# Patient Record
Sex: FEMALE | Race: BLACK OR AFRICAN AMERICAN | Employment: OTHER | URBAN - METROPOLITAN AREA
[De-identification: names, ages, dates, MRNs, and addresses within clinical notes are randomized per-mention and may not be internally consistent; named-entity substitution may affect disease eponyms.]

---

## 2017-01-26 ENCOUNTER — OFFICE VISIT (OUTPATIENT)
Dept: CARDIOLOGY CLINIC | Age: 67
End: 2017-01-26

## 2017-01-26 ENCOUNTER — PROCEDURE VISIT (OUTPATIENT)
Dept: CARDIOLOGY CLINIC | Age: 67
End: 2017-01-26

## 2017-01-26 VITALS — HEART RATE: 64 BPM | DIASTOLIC BLOOD PRESSURE: 64 MMHG | SYSTOLIC BLOOD PRESSURE: 98 MMHG

## 2017-01-26 DIAGNOSIS — Z95.0 PACEMAKER: ICD-10-CM

## 2017-01-26 DIAGNOSIS — I49.5 SSS (SICK SINUS SYNDROME) (HCC): Primary | ICD-10-CM

## 2017-01-26 DIAGNOSIS — I49.5 SSS (SICK SINUS SYNDROME) (HCC): ICD-10-CM

## 2017-01-26 DIAGNOSIS — I10 ESSENTIAL HYPERTENSION: ICD-10-CM

## 2017-01-26 PROCEDURE — 93280 PM DEVICE PROGR EVAL DUAL: CPT | Performed by: INTERNAL MEDICINE

## 2017-01-26 PROCEDURE — 99214 OFFICE O/P EST MOD 30 MIN: CPT | Performed by: INTERNAL MEDICINE

## 2017-01-26 RX ORDER — CALCIUM CARBONATE 500(1250)
500 TABLET ORAL DAILY
COMMUNITY

## 2017-01-26 RX ORDER — M-VIT,TX,IRON,MINS/CALC/FOLIC 27MG-0.4MG
1 TABLET ORAL DAILY
COMMUNITY

## 2017-01-26 RX ORDER — UBIDECARENONE 75 MG
50 CAPSULE ORAL DAILY
COMMUNITY

## 2017-04-07 ENCOUNTER — TELEPHONE (OUTPATIENT)
Dept: PRIMARY CARE CLINIC | Age: 67
End: 2017-04-07

## 2017-05-02 ENCOUNTER — OFFICE VISIT (OUTPATIENT)
Dept: CARDIOLOGY CLINIC | Age: 67
End: 2017-05-02

## 2017-05-02 ENCOUNTER — PROCEDURE VISIT (OUTPATIENT)
Dept: CARDIOLOGY CLINIC | Age: 67
End: 2017-05-02

## 2017-05-02 VITALS — HEART RATE: 65 BPM | DIASTOLIC BLOOD PRESSURE: 60 MMHG | SYSTOLIC BLOOD PRESSURE: 100 MMHG

## 2017-05-02 DIAGNOSIS — Z95.0 PACEMAKER: Primary | ICD-10-CM

## 2017-05-02 DIAGNOSIS — I49.5 SSS (SICK SINUS SYNDROME) (HCC): ICD-10-CM

## 2017-05-02 DIAGNOSIS — I10 ESSENTIAL HYPERTENSION: Primary | ICD-10-CM

## 2017-05-02 DIAGNOSIS — I42.9 CARDIOMYOPATHY (HCC): ICD-10-CM

## 2017-05-02 PROCEDURE — 99214 OFFICE O/P EST MOD 30 MIN: CPT | Performed by: INTERNAL MEDICINE

## 2017-05-02 PROCEDURE — 93280 PM DEVICE PROGR EVAL DUAL: CPT | Performed by: INTERNAL MEDICINE

## 2017-05-04 ENCOUNTER — TELEPHONE (OUTPATIENT)
Dept: CARDIOLOGY CLINIC | Age: 67
End: 2017-05-04

## 2017-05-23 ENCOUNTER — OFFICE VISIT (OUTPATIENT)
Dept: CARDIOLOGY CLINIC | Age: 67
End: 2017-05-23

## 2017-05-23 VITALS
HEART RATE: 60 BPM | BODY MASS INDEX: 33.2 KG/M2 | WEIGHT: 212 LBS | SYSTOLIC BLOOD PRESSURE: 128 MMHG | DIASTOLIC BLOOD PRESSURE: 72 MMHG

## 2017-05-23 DIAGNOSIS — R07.9 CHEST PAIN, UNSPECIFIED TYPE: ICD-10-CM

## 2017-05-23 DIAGNOSIS — Z95.0 PACEMAKER: ICD-10-CM

## 2017-05-23 DIAGNOSIS — K21.9 GASTROESOPHAGEAL REFLUX DISEASE, ESOPHAGITIS PRESENCE NOT SPECIFIED: ICD-10-CM

## 2017-05-23 DIAGNOSIS — I10 ESSENTIAL HYPERTENSION: ICD-10-CM

## 2017-05-23 DIAGNOSIS — I49.5 SSS (SICK SINUS SYNDROME) (HCC): Primary | ICD-10-CM

## 2017-05-23 PROCEDURE — 99214 OFFICE O/P EST MOD 30 MIN: CPT | Performed by: NURSE PRACTITIONER

## 2017-08-03 ENCOUNTER — PROCEDURE VISIT (OUTPATIENT)
Dept: CARDIOLOGY CLINIC | Age: 67
End: 2017-08-03

## 2017-08-03 ENCOUNTER — OFFICE VISIT (OUTPATIENT)
Dept: CARDIOLOGY CLINIC | Age: 67
End: 2017-08-03

## 2017-08-03 VITALS — HEART RATE: 60 BPM | DIASTOLIC BLOOD PRESSURE: 72 MMHG | SYSTOLIC BLOOD PRESSURE: 124 MMHG

## 2017-08-03 DIAGNOSIS — I42.0 DILATED CARDIOMYOPATHY (HCC): ICD-10-CM

## 2017-08-03 DIAGNOSIS — I49.5 SSS (SICK SINUS SYNDROME) (HCC): ICD-10-CM

## 2017-08-03 DIAGNOSIS — I49.5 SSS (SICK SINUS SYNDROME) (HCC): Primary | ICD-10-CM

## 2017-08-03 DIAGNOSIS — Z95.0 PACEMAKER: ICD-10-CM

## 2017-08-03 PROCEDURE — 93280 PM DEVICE PROGR EVAL DUAL: CPT | Performed by: INTERNAL MEDICINE

## 2017-08-03 PROCEDURE — 99214 OFFICE O/P EST MOD 30 MIN: CPT | Performed by: INTERNAL MEDICINE

## 2017-08-23 ENCOUNTER — TELEPHONE (OUTPATIENT)
Dept: CARDIOLOGY CLINIC | Age: 67
End: 2017-08-23

## 2017-10-10 ENCOUNTER — OFFICE VISIT (OUTPATIENT)
Dept: CARDIOLOGY CLINIC | Age: 67
End: 2017-10-10

## 2017-10-10 VITALS — HEART RATE: 60 BPM | SYSTOLIC BLOOD PRESSURE: 132 MMHG | DIASTOLIC BLOOD PRESSURE: 70 MMHG

## 2017-10-10 DIAGNOSIS — Z95.0 PACEMAKER: ICD-10-CM

## 2017-10-10 DIAGNOSIS — I25.9 CHEST PAIN DUE TO MYOCARDIAL ISCHEMIA, UNSPECIFIED ISCHEMIC CHEST PAIN TYPE: ICD-10-CM

## 2017-10-10 DIAGNOSIS — I10 ESSENTIAL HYPERTENSION: ICD-10-CM

## 2017-10-10 DIAGNOSIS — I49.5 SSS (SICK SINUS SYNDROME) (HCC): ICD-10-CM

## 2017-10-10 PROCEDURE — 99214 OFFICE O/P EST MOD 30 MIN: CPT | Performed by: INTERNAL MEDICINE

## 2017-10-10 RX ORDER — ISOSORBIDE DINITRATE AND HYDRALAZINE HYDROCHLORIDE 37.5; 2 MG/1; MG/1
0.51 TABLET ORAL 2 TIMES DAILY
Qty: 90 TABLET | Refills: 3 | Status: SHIPPED | OUTPATIENT
Start: 2017-10-10 | End: 2017-10-18 | Stop reason: SDUPTHER

## 2017-10-10 NOTE — PROGRESS NOTES
isosorbide-hydrALAZINE (BIDIL) 20-37.5 MG per tablet Take 0.5 tablets by mouth 2 times daily 60 tablet 11     No current facility-administered medications for this visit. REVIEW OF SYSTEMS:    CONSTITUTIONAL: No major weight gain or loss, fatigue, weakness, night sweats or fever. HEENT: No new vision difficulties or ringing in the ears. RESPIRATORY: No new SOB, PND, orthopnea or cough. CARDIOVASCULAR: See HPI  GI: No nausea, vomiting, diarrhea, constipation, abdominal pain or changes in bowel habits. : No urinary frequency, urgency, incontinence hematuria or dysuria. SKIN: No cyanosis or skin lesions. MUSCULOSKELETAL: No new muscle or joint pain. NEUROLOGICAL: No syncope or TIA-like symptoms. PSYCHIATRIC: No anxiety, pain, insomnia or depression    Objective:   PHYSICAL EXAM:        VITALS:    Wt Readings from Last 3 Encounters:   05/23/17 212 lb (96.2 kg)   09/26/16 208 lb (94.3 kg)   04/22/16 219 lb 6.4 oz (99.5 kg)     BP Readings from Last 3 Encounters:   10/10/17 132/70   08/03/17 124/72   05/23/17 128/72     Pulse Readings from Last 3 Encounters:   10/10/17 60   08/03/17 60   05/23/17 60       CONSTITUTIONAL: Cooperative, no apparent distress, and appears well nourished / developed  NEUROLOGIC:  Awake and orientated to person, place and time. PSYCH: Calm affect. SKIN: Warm and dry. HEENT: Sclera non-icteric, normocephalic, neck supple, no elevation of JVP, normal carotid pulses with no bruits and thyroid normal size. LUNGS:  No increased work of breathing and clear to auscultation, no crackles or wheezing  CARDIOVASCULAR: The lungs are clear. There are no lifts or thrills. The heart is regular. The abdomen is soft. Cervical veins are not engorged  The carotid upstroke is normal in amplitude and contour without delay or bruit  JVP is not elevated  ABDOMEN:  Normal bowel sounds, non-distended and non-tender to palpation  EXT: No edema, no calf tenderness.  Pulses are present bilaterally. DATA:    Lab Results   Component Value Date    ALT 18 01/14/2016    AST 16 01/14/2016    ALKPHOS 59 01/14/2016    BILITOT 0.3 01/14/2016     Lab Results   Component Value Date    CREATININE 0.8 01/14/2016    BUN 11 01/14/2016     01/14/2016    K 4.4 01/14/2016     01/14/2016    CO2 26 01/14/2016     No results found for: TSH, J5PDCVM, T4GVCAO, THYROIDAB  Lab Results   Component Value Date    WBC 6.3 01/14/2016    HGB 12.4 01/14/2016    HCT 38.5 01/14/2016    MCV 87.7 01/14/2016     01/14/2016     No components found for: CHLPL  No results found for: TRIG  No results found for: HDL  No results found for: LDLCALC  No results found for: LABVLDL  Radiology Review:  Pertinent images / reports were reviewed as a part of this visit and reveals the following:    Last Echo: September 2015  Conclusions      Summary   Left ventricle size is normal.   Normal left ventricular wall thickness.   There is reversal of E/A inflow velocities across the mitral valve   suggesting impaired left ventricular relaxation.   E/e'= 9.1 .   Overall left ventricular systolic function is normal with LVEF 55 to 60%   Mitral valve is structurally normal.   Trivial mitral regurgitation is present.   The left atrium is dilated.   Normal right ventricular size and function.   Pacer / ICD wire is visualized in the right ventricle.     Last Stress Test / Angiogram:  Summary 7/1/15   No evidence for lexiscan-induced ischemia. The LV cavity is qualitatively   more dilated at stress and at rest. The TID is 1.36 and is abnormal.   Left ventricular ejection fraction is greater than 70% with normal LV wall   motion on post-stress gated imaging.       Conclusions 10/4/16      Summary   Left ventricle size is normal.   Normal left ventricular wall thickness.   There is reversal of E/A inflow velocities across the mitral valve   suggesting impaired left ventricular relaxation.   E/e'= 9.1 .   Overall left ventricular systolic function is normal with LVEF 55 to 60%   Mitral valve is structurally normal.   Trivial mitral regurgitation is present.   The left atrium is dilated.   Normal right ventricular size and function.   Pacer / ICD wire is visualized in the right ventricle.     Conclusions 10/4/16        Summary    Normal stress myocardial perfusion.        There is normal isotope uptake at stress and rest. There is no evidence of    myocardial ischemia or scar.    Normal LV size and systolic function.       This is a left dominant coronary arterial system Cath 10/3/17 at [facility]    Left Main coronary artery: angiographically normal  Left anterior descending coronary artery: angiographically normal  Left circumflex coronary artery: angiographically normal  Right coronary artery: non-dominant, angiographically normal  Left ventriculogram: normal wall motion, LVEF = 55-60%  LVEDP: 10 mmHg  Aortic pressure: 114/90 mmHg    Impression:   Angiographically normal  Normal LV function  Normal LVEDP  Normal systemic pressures    Plan:  Continue medical therapy for cardiovascular risk factor   reduction. Last ECG:  Sinus rhythm with AV dual pacing at 60/m. Assessment:    . She has a history of cardiomyopathy that has improved over the years. A heart cath was performed on October 3 and she had normal coronary arteries with normalized ejection fraction at 55-60%. She is doing fine and has no complications. We will have her taper down and get rid of her isosorbide. Return to see me in 6 months.

## 2017-10-18 ENCOUNTER — TELEPHONE (OUTPATIENT)
Dept: CARDIOLOGY CLINIC | Age: 67
End: 2017-10-18

## 2017-10-18 DIAGNOSIS — I10 ESSENTIAL HYPERTENSION: ICD-10-CM

## 2017-10-18 DIAGNOSIS — I49.5 SSS (SICK SINUS SYNDROME) (HCC): ICD-10-CM

## 2017-10-18 DIAGNOSIS — Z95.0 PACEMAKER: ICD-10-CM

## 2017-10-18 DIAGNOSIS — I25.9 CHEST PAIN DUE TO MYOCARDIAL ISCHEMIA, UNSPECIFIED ISCHEMIC CHEST PAIN TYPE: ICD-10-CM

## 2017-10-18 RX ORDER — ISOSORBIDE DINITRATE AND HYDRALAZINE HYDROCHLORIDE 37.5; 2 MG/1; MG/1
0.51 TABLET ORAL 2 TIMES DAILY
Qty: 30 TABLET | Refills: 3 | Status: SHIPPED | OUTPATIENT
Start: 2017-10-18 | End: 2018-03-08 | Stop reason: SDUPTHER

## 2017-10-18 NOTE — TELEPHONE ENCOUNTER
Ms. Parker Blunt  came into the office stating they are having trouble affording patient's Bidil. Please call patient about this. Thanks.

## 2017-10-18 NOTE — TELEPHONE ENCOUNTER
Spoke with patient,per her insurance co they want her to try other htn medicines  Patient does not want to do this since she has been on bidil since 2004

## 2017-10-20 ENCOUNTER — TELEPHONE (OUTPATIENT)
Dept: CARDIOLOGY CLINIC | Age: 67
End: 2017-10-20

## 2017-12-05 ENCOUNTER — NURSE ONLY (OUTPATIENT)
Dept: CARDIOLOGY CLINIC | Age: 67
End: 2017-12-05

## 2017-12-05 DIAGNOSIS — Z95.0 PACEMAKER: ICD-10-CM

## 2017-12-05 DIAGNOSIS — I49.5 SSS (SICK SINUS SYNDROME) (HCC): ICD-10-CM

## 2017-12-05 PROCEDURE — 93294 REM INTERROG EVL PM/LDLS PM: CPT | Performed by: INTERNAL MEDICINE

## 2017-12-05 PROCEDURE — 93296 REM INTERROG EVL PM/IDS: CPT | Performed by: INTERNAL MEDICINE

## 2017-12-06 ENCOUNTER — TELEPHONE (OUTPATIENT)
Dept: CARDIOLOGY CLINIC | Age: 67
End: 2017-12-06

## 2017-12-08 NOTE — PROGRESS NOTES
Merlin transmission shows normal device function. See interrogation for further details. Recheck 3 mos.

## 2018-02-09 ENCOUNTER — OFFICE VISIT (OUTPATIENT)
Dept: CARDIOLOGY CLINIC | Age: 68
End: 2018-02-09

## 2018-02-09 VITALS — SYSTOLIC BLOOD PRESSURE: 112 MMHG | HEART RATE: 62 BPM | DIASTOLIC BLOOD PRESSURE: 60 MMHG

## 2018-02-09 DIAGNOSIS — I25.10 CORONARY ARTERY DISEASE DUE TO LIPID RICH PLAQUE: Primary | ICD-10-CM

## 2018-02-09 DIAGNOSIS — I25.83 CORONARY ARTERY DISEASE DUE TO LIPID RICH PLAQUE: Primary | ICD-10-CM

## 2018-02-09 PROCEDURE — 99214 OFFICE O/P EST MOD 30 MIN: CPT | Performed by: INTERNAL MEDICINE

## 2018-02-09 NOTE — PROGRESS NOTES
amplitude and contour without delay or bruit  JVP is not elevated  ABDOMEN:  Normal bowel sounds, non-distended and non-tender to palpation  EXT: No edema, no calf tenderness. Pulses are present bilaterally. DATA:    Lab Results   Component Value Date    ALT 18 01/14/2016    AST 16 01/14/2016    ALKPHOS 59 01/14/2016    BILITOT 0.3 01/14/2016     Lab Results   Component Value Date    CREATININE 0.8 01/14/2016    BUN 11 01/14/2016     01/14/2016    K 4.4 01/14/2016     01/14/2016    CO2 26 01/14/2016     No results found for: TSH, E5NAAER, R6DAHHS, THYROIDAB  Lab Results   Component Value Date    WBC 6.3 01/14/2016    HGB 12.4 01/14/2016    HCT 38.5 01/14/2016    MCV 87.7 01/14/2016     01/14/2016     No components found for: CHLPL  No results found for: TRIG  No results found for: HDL  No results found for: LDLCALC  No results found for: LABVLDL  Radiology Review:  Pertinent images / reports were reviewed as a part of this visit and reveals the following:    Last Echo: September 2015  Conclusions      Summary   Left ventricle size is normal.   Normal left ventricular wall thickness.   There is reversal of E/A inflow velocities across the mitral valve   suggesting impaired left ventricular relaxation.   E/e'= 9.1 .   Overall left ventricular systolic function is normal with LVEF 55 to 60%   Mitral valve is structurally normal.   Trivial mitral regurgitation is present.   The left atrium is dilated.   Normal right ventricular size and function.   Pacer / ICD wire is visualized in the right ventricle.     Last Stress Test / Angiogram:  Summary 7/1/15   No evidence for lexiscan-induced ischemia. The LV cavity is qualitatively   more dilated at stress and at rest. The TID is 1.36 and is abnormal.   Left ventricular ejection fraction is greater than 70% with normal LV wall   motion on post-stress gated imaging.       Conclusions 10/4/16      Summary   Left ventricle size is normal.   Normal left will obtain carotid Dopplers on this patient.

## 2018-03-07 DIAGNOSIS — I25.9 CHEST PAIN DUE TO MYOCARDIAL ISCHEMIA, UNSPECIFIED ISCHEMIC CHEST PAIN TYPE: ICD-10-CM

## 2018-03-07 DIAGNOSIS — Z95.0 PACEMAKER: ICD-10-CM

## 2018-03-07 DIAGNOSIS — I10 ESSENTIAL HYPERTENSION: ICD-10-CM

## 2018-03-07 DIAGNOSIS — I49.5 SSS (SICK SINUS SYNDROME) (HCC): ICD-10-CM

## 2018-03-08 DIAGNOSIS — Z95.0 PACEMAKER: ICD-10-CM

## 2018-03-08 DIAGNOSIS — I49.5 SSS (SICK SINUS SYNDROME) (HCC): ICD-10-CM

## 2018-03-08 DIAGNOSIS — I25.9 CHEST PAIN DUE TO MYOCARDIAL ISCHEMIA, UNSPECIFIED ISCHEMIC CHEST PAIN TYPE: ICD-10-CM

## 2018-03-08 DIAGNOSIS — I10 ESSENTIAL HYPERTENSION: ICD-10-CM

## 2018-03-08 RX ORDER — ISOSORBIDE DINITRATE 10 MG/1
10 TABLET ORAL 2 TIMES DAILY
Qty: 90 TABLET | Refills: 3 | Status: SHIPPED | OUTPATIENT
Start: 2018-03-08 | End: 2018-09-13 | Stop reason: SDUPTHER

## 2018-03-08 RX ORDER — ISOSORBIDE DINITRATE AND HYDRALAZINE HYDROCHLORIDE 37.5; 2 MG/1; MG/1
0.51 TABLET ORAL 2 TIMES DAILY
Qty: 30 TABLET | Refills: 11 | Status: SHIPPED | OUTPATIENT
Start: 2018-03-08 | End: 2018-03-09 | Stop reason: SDUPTHER

## 2018-03-08 RX ORDER — ISOSORBIDE DINITRATE AND HYDRALAZINE HYDROCHLORIDE 37.5; 2 MG/1; MG/1
0.51 TABLET ORAL 2 TIMES DAILY
Qty: 90 TABLET | Refills: 3 | Status: CANCELLED | OUTPATIENT
Start: 2018-03-08

## 2018-03-08 RX ORDER — ISOSORBIDE DINITRATE 10 MG/1
10 TABLET ORAL 2 TIMES DAILY
Qty: 90 TABLET | Refills: 3 | Status: SHIPPED | OUTPATIENT
Start: 2018-03-08 | End: 2018-03-08 | Stop reason: SDUPTHER

## 2018-03-09 DIAGNOSIS — I10 ESSENTIAL HYPERTENSION: ICD-10-CM

## 2018-03-09 DIAGNOSIS — I25.9 CHEST PAIN DUE TO MYOCARDIAL ISCHEMIA, UNSPECIFIED ISCHEMIC CHEST PAIN TYPE: ICD-10-CM

## 2018-03-09 DIAGNOSIS — I49.5 SSS (SICK SINUS SYNDROME) (HCC): ICD-10-CM

## 2018-03-09 DIAGNOSIS — Z95.0 PACEMAKER: ICD-10-CM

## 2018-03-09 RX ORDER — ISOSORBIDE DINITRATE AND HYDRALAZINE HYDROCHLORIDE 37.5; 2 MG/1; MG/1
0.51 TABLET ORAL 2 TIMES DAILY
Qty: 180 TABLET | Refills: 3 | Status: SHIPPED | OUTPATIENT
Start: 2018-03-09 | End: 2018-06-01 | Stop reason: SDUPTHER

## 2018-04-02 ENCOUNTER — PROCEDURE VISIT (OUTPATIENT)
Dept: CARDIOLOGY CLINIC | Age: 68
End: 2018-04-02

## 2018-04-02 DIAGNOSIS — Z95.0 PACEMAKER: ICD-10-CM

## 2018-04-02 DIAGNOSIS — I49.5 SSS (SICK SINUS SYNDROME) (HCC): ICD-10-CM

## 2018-04-02 PROCEDURE — 93280 PM DEVICE PROGR EVAL DUAL: CPT | Performed by: INTERNAL MEDICINE

## 2018-04-16 ENCOUNTER — OFFICE VISIT (OUTPATIENT)
Dept: CARDIOLOGY CLINIC | Age: 68
End: 2018-04-16

## 2018-04-16 VITALS — DIASTOLIC BLOOD PRESSURE: 76 MMHG | SYSTOLIC BLOOD PRESSURE: 104 MMHG | HEART RATE: 78 BPM

## 2018-04-16 DIAGNOSIS — I10 ESSENTIAL HYPERTENSION: Primary | ICD-10-CM

## 2018-04-16 PROCEDURE — 99214 OFFICE O/P EST MOD 30 MIN: CPT | Performed by: NURSE PRACTITIONER

## 2018-06-01 ENCOUNTER — TELEPHONE (OUTPATIENT)
Dept: CARDIOLOGY CLINIC | Age: 68
End: 2018-06-01

## 2018-06-01 DIAGNOSIS — Z95.0 PACEMAKER: ICD-10-CM

## 2018-06-01 DIAGNOSIS — I25.9 CHEST PAIN DUE TO MYOCARDIAL ISCHEMIA, UNSPECIFIED ISCHEMIC CHEST PAIN TYPE: ICD-10-CM

## 2018-06-01 DIAGNOSIS — I10 ESSENTIAL HYPERTENSION: ICD-10-CM

## 2018-06-01 DIAGNOSIS — I49.5 SSS (SICK SINUS SYNDROME) (HCC): ICD-10-CM

## 2018-06-01 RX ORDER — ISOSORBIDE DINITRATE AND HYDRALAZINE HYDROCHLORIDE 37.5; 2 MG/1; MG/1
0.51 TABLET ORAL 2 TIMES DAILY
Qty: 180 TABLET | Refills: 3 | Status: SHIPPED | OUTPATIENT
Start: 2018-06-01 | End: 2018-11-30 | Stop reason: SDUPTHER

## 2018-07-03 ENCOUNTER — NURSE ONLY (OUTPATIENT)
Dept: CARDIOLOGY CLINIC | Age: 68
End: 2018-07-03

## 2018-07-03 DIAGNOSIS — Z95.0 PACEMAKER: ICD-10-CM

## 2018-07-03 DIAGNOSIS — I49.5 SSS (SICK SINUS SYNDROME) (HCC): ICD-10-CM

## 2018-07-03 PROCEDURE — 93294 REM INTERROG EVL PM/LDLS PM: CPT | Performed by: INTERNAL MEDICINE

## 2018-07-03 PROCEDURE — 93296 REM INTERROG EVL PM/IDS: CPT | Performed by: INTERNAL MEDICINE

## 2018-07-03 NOTE — PROGRESS NOTES
Merlin transmission shows normal device function. Battery and lead function stable. No VT. Multiple RYAN episodes, brief. Oversensing FFRW. See report under media tab. Recheck 3 mos.  mk

## 2018-09-13 ENCOUNTER — OFFICE VISIT (OUTPATIENT)
Dept: CARDIOLOGY CLINIC | Age: 68
End: 2018-09-13

## 2018-09-13 VITALS
WEIGHT: 216 LBS | HEART RATE: 68 BPM | BODY MASS INDEX: 33.83 KG/M2 | DIASTOLIC BLOOD PRESSURE: 68 MMHG | SYSTOLIC BLOOD PRESSURE: 122 MMHG

## 2018-09-13 DIAGNOSIS — I49.5 SSS (SICK SINUS SYNDROME) (HCC): ICD-10-CM

## 2018-09-13 DIAGNOSIS — R07.9 CHEST PAIN, UNSPECIFIED TYPE: ICD-10-CM

## 2018-09-13 DIAGNOSIS — I10 ESSENTIAL HYPERTENSION: ICD-10-CM

## 2018-09-13 DIAGNOSIS — Z95.0 PACEMAKER: Primary | ICD-10-CM

## 2018-09-13 PROCEDURE — 99214 OFFICE O/P EST MOD 30 MIN: CPT | Performed by: NURSE PRACTITIONER

## 2018-09-13 RX ORDER — UBIDECARENONE 75 MG
100 CAPSULE ORAL
COMMUNITY
End: 2018-09-13

## 2018-09-13 RX ORDER — RANITIDINE 150 MG/1
TABLET ORAL
COMMUNITY
Start: 2018-09-11 | End: 2020-01-29

## 2018-09-26 ENCOUNTER — HOSPITAL ENCOUNTER (OUTPATIENT)
Dept: NON INVASIVE DIAGNOSTICS | Age: 68
Discharge: HOME | End: 2018-09-26
Payer: MEDICARE

## 2018-09-26 ENCOUNTER — HOSPITAL ENCOUNTER (OUTPATIENT)
Dept: NUCLEAR MEDICINE | Age: 68
Discharge: HOME | End: 2018-09-26
Payer: MEDICARE

## 2018-09-26 DIAGNOSIS — I10 ESSENTIAL HYPERTENSION: ICD-10-CM

## 2018-09-26 DIAGNOSIS — R07.9 CHEST PAIN, UNSPECIFIED TYPE: ICD-10-CM

## 2018-09-26 DIAGNOSIS — I49.5 SSS (SICK SINUS SYNDROME) (HCC): ICD-10-CM

## 2018-09-26 DIAGNOSIS — Z95.0 PACEMAKER: ICD-10-CM

## 2018-09-26 LAB
LV EF: 48 %
LV EF: 57 %
LVEF MODALITY: NORMAL
LVEF MODALITY: NORMAL

## 2018-09-26 PROCEDURE — 6360000002 HC RX W HCPCS: Performed by: NURSE PRACTITIONER

## 2018-09-26 PROCEDURE — A9502 TC99M TETROFOSMIN: HCPCS | Performed by: NURSE PRACTITIONER

## 2018-09-26 PROCEDURE — 3430000000 HC RX DIAGNOSTIC RADIOPHARMACEUTICAL: Performed by: NURSE PRACTITIONER

## 2018-09-26 PROCEDURE — 78452 HT MUSCLE IMAGE SPECT MULT: CPT

## 2018-09-26 PROCEDURE — 2580000003 HC RX 258: Performed by: NURSE PRACTITIONER

## 2018-09-26 PROCEDURE — 93306 TTE W/DOPPLER COMPLETE: CPT

## 2018-09-26 PROCEDURE — 93017 CV STRESS TEST TRACING ONLY: CPT

## 2018-09-26 RX ORDER — 0.9 % SODIUM CHLORIDE 0.9 %
10 VIAL (ML) INJECTION PRN
Status: DISCONTINUED | OUTPATIENT
Start: 2018-09-26 | End: 2018-09-27 | Stop reason: HOSPADM

## 2018-09-26 RX ADMIN — SODIUM CHLORIDE 10 ML: 9 INJECTION, SOLUTION INTRAMUSCULAR; INTRAVENOUS; SUBCUTANEOUS at 10:00

## 2018-09-26 RX ADMIN — TETROFOSMIN 10 MILLICURIE: 1.38 INJECTION, POWDER, LYOPHILIZED, FOR SOLUTION INTRAVENOUS at 10:00

## 2018-09-26 RX ADMIN — REGADENOSON 0.4 MG: 0.08 INJECTION, SOLUTION INTRAVENOUS at 11:40

## 2018-09-26 RX ADMIN — SODIUM CHLORIDE 10 ML: 9 INJECTION, SOLUTION INTRAMUSCULAR; INTRAVENOUS; SUBCUTANEOUS at 11:40

## 2018-09-26 RX ADMIN — TETROFOSMIN 30 MILLICURIE: 1.38 INJECTION, POWDER, LYOPHILIZED, FOR SOLUTION INTRAVENOUS at 11:40

## 2018-09-27 ENCOUNTER — TELEPHONE (OUTPATIENT)
Dept: CARDIOLOGY CLINIC | Age: 68
End: 2018-09-27

## 2018-09-27 NOTE — TELEPHONE ENCOUNTER
----- Message from MARIA ELENA John CNP sent at 9/26/2018  3:31 PM EDT -----    No reversible ischemia  lmom

## 2018-10-02 ENCOUNTER — NURSE ONLY (OUTPATIENT)
Dept: CARDIOLOGY CLINIC | Age: 68
End: 2018-10-02
Payer: MEDICARE

## 2018-10-02 DIAGNOSIS — I49.5 SSS (SICK SINUS SYNDROME) (HCC): ICD-10-CM

## 2018-10-02 DIAGNOSIS — Z95.0 PACEMAKER: ICD-10-CM

## 2018-10-02 PROCEDURE — 93296 REM INTERROG EVL PM/IDS: CPT | Performed by: INTERNAL MEDICINE

## 2018-10-02 PROCEDURE — 93294 REM INTERROG EVL PM/LDLS PM: CPT | Performed by: INTERNAL MEDICINE

## 2018-10-02 NOTE — LETTER
[address] [facility] [phone]  [place]- [phone]  [facility] [place] [phone]    Pacemaker/Defibrillator Clinic          10/04/18        [address] [facility]        Dear Louann Rico    This letter is to inform you that we received the transmission from your monitor at home that checks your pacemaker and/or defibrillator, or implanted heart monitor. The next date your monitor will automatically transmit will be 1/2/19. Please do not send additional routine transmissions unless specifically requested. Your device and monitor are wireless and most transmit cellularly, but please periodically check your monitor is still plugged in to the electrical outlet. If you still use the telephone land line to send please ensure the connection to the phone adela is secure. This will help to ensure successful automatic transmissions in the future. Also, the monitor needs to be close to you while sleeping at night. Please be aware that the remote device transmission sites are periodically monitored only during regular business hours during which simultaneous in-office device clinics are being run. If your transmission requires attention, we will contact you as soon as possible. Thank you.             [address]

## 2018-11-02 ENCOUNTER — OFFICE VISIT (OUTPATIENT)
Dept: CARDIOLOGY CLINIC | Age: 68
End: 2018-11-02
Payer: MEDICARE

## 2018-11-02 VITALS
HEART RATE: 60 BPM | WEIGHT: 211 LBS | SYSTOLIC BLOOD PRESSURE: 118 MMHG | BODY MASS INDEX: 33.05 KG/M2 | DIASTOLIC BLOOD PRESSURE: 68 MMHG

## 2018-11-02 DIAGNOSIS — I49.5 SSS (SICK SINUS SYNDROME) (HCC): ICD-10-CM

## 2018-11-02 DIAGNOSIS — Z95.0 PACEMAKER: Primary | ICD-10-CM

## 2018-11-02 DIAGNOSIS — I10 ESSENTIAL HYPERTENSION: ICD-10-CM

## 2018-11-02 PROCEDURE — 99214 OFFICE O/P EST MOD 30 MIN: CPT | Performed by: NURSE PRACTITIONER

## 2018-11-02 NOTE — PROGRESS NOTES
[facility]   Dr Rob Arizmendi MD, [address] [facility]                                                 Outpatient Follow Up Note      CC: fu with HTN SSS/ SJM dual chamber PPM  interrogation today  No c/o cp SOB edema     11/02/18  HPI:  Aditya Ceballos is 75 y.o. female who presents today with PPM  C normal  cors 10/2017   / neg stress test in 2018   2/14 adhesions removed             10/17 Angiographically normal  Normal LV function  Normal LVEDP  Normal systemic pressures        Prior to Visit Medications    Medication Sig Taking? Authorizing Provider   ranitidine (ZANTAC) 150 MG tablet   Historical Provider, MD   isosorbide-hydrALAZINE (BIDIL) 20-37.5 MG per tablet Take 0.5 tablets by mouth 2 times daily  Justine Carbajal MD   calcium carbonate (OSCAL) 500 MG TABS tablet Take 500 mg by mouth daily  Historical Provider, MD   vitamin B-12 (CYANOCOBALAMIN) 100 MCG tablet Take 50 mcg by mouth daily  Historical Provider, MD   Multiple Vitamins-Minerals (THERAPEUTIC MULTIVITAMIN-MINERALS) tablet Take 1 tablet by mouth daily  Historical Provider, MD     Family History  History reviewed. No pertinent family history. · ROS:   · Cardiovascular: Reviewed in HPI  · Allergic/Immunologic: No nasal congestion or hives. · All other ROS are reviewed and are unremarkable. PHYSICAL EXAM:      Wt Readings from Last 3 Encounters:   11/02/18 211 lb (95.7 kg)   09/13/18 216 lb (98 kg)   05/23/17 212 lb (96.2 kg)       BP Readings from Last 3 Encounters:   11/02/18 118/68   09/13/18 122/68   04/16/18 104/76       Pulse Readings from Last 3 Encounters:   11/02/18 60   09/13/18 68   04/16/18 78       Exam   ENT: neg   NEUROLOGIC:  Neg   PSYCH: neg  SKIN: Warm and dry.   LUNGS:  No increased work of breathing and clear to auscultation, no crackles or wheezing  CARDIOVASCULAR: RRR  ABDOMEN: soft   EXT: no edema     Assessment:    SSS  SJM  Interrogation [facility] dual chamber

## 2018-11-28 ENCOUNTER — TELEPHONE (OUTPATIENT)
Dept: CARDIOLOGY CLINIC | Age: 68
End: 2018-11-28

## 2018-11-30 DIAGNOSIS — I25.9 CHEST PAIN DUE TO MYOCARDIAL ISCHEMIA, UNSPECIFIED ISCHEMIC CHEST PAIN TYPE: ICD-10-CM

## 2018-11-30 DIAGNOSIS — Z95.0 PACEMAKER: ICD-10-CM

## 2018-11-30 DIAGNOSIS — I10 ESSENTIAL HYPERTENSION: ICD-10-CM

## 2018-11-30 DIAGNOSIS — I49.5 SSS (SICK SINUS SYNDROME) (HCC): ICD-10-CM

## 2018-11-30 RX ORDER — ISOSORBIDE DINITRATE AND HYDRALAZINE HYDROCHLORIDE 37.5; 2 MG/1; MG/1
0.51 TABLET ORAL 2 TIMES DAILY
Qty: 60 TABLET | Refills: 11 | Status: SHIPPED | OUTPATIENT
Start: 2018-11-30 | End: 2019-07-01 | Stop reason: SDUPTHER

## 2018-11-30 RX ORDER — ISOSORBIDE DINITRATE AND HYDRALAZINE HYDROCHLORIDE 37.5; 2 MG/1; MG/1
0.51 TABLET ORAL 2 TIMES DAILY
Qty: 60 TABLET | Refills: 11 | Status: SHIPPED | OUTPATIENT
Start: 2018-11-30 | End: 2018-11-30 | Stop reason: SDUPTHER

## 2019-01-02 ENCOUNTER — NURSE ONLY (OUTPATIENT)
Dept: CARDIOLOGY CLINIC | Age: 69
End: 2019-01-02
Payer: MEDICARE

## 2019-01-02 DIAGNOSIS — I49.5 SSS (SICK SINUS SYNDROME) (HCC): ICD-10-CM

## 2019-01-02 DIAGNOSIS — Z95.0 PACEMAKER: ICD-10-CM

## 2019-01-02 PROCEDURE — 93294 REM INTERROG EVL PM/LDLS PM: CPT | Performed by: INTERNAL MEDICINE

## 2019-01-02 PROCEDURE — 93296 REM INTERROG EVL PM/IDS: CPT | Performed by: INTERNAL MEDICINE

## 2019-04-02 ENCOUNTER — NURSE ONLY (OUTPATIENT)
Dept: CARDIOLOGY CLINIC | Age: 69
End: 2019-04-02

## 2019-04-02 DIAGNOSIS — Z95.0 PACEMAKER: ICD-10-CM

## 2019-04-02 DIAGNOSIS — I49.5 SSS (SICK SINUS SYNDROME) (HCC): ICD-10-CM

## 2019-04-02 NOTE — LETTER
[address] [facility] [phone]  [facility]- [phone]  [facility] [address] [phone]    Pacemaker/Defibrillator Clinic      04/08/19        [address] [facility]        Dear Orlando Lewis    This letter is to inform you that we received the transmission from your monitor at home that checks your pacemaker and/or defibrillator, or implanted heart monitor. The next date your monitor will automatically transmit will be 7/2/19. Please do not send additional routine transmissions unless specifically requested. Your device and monitor are wireless and most transmit cellularly, but please periodically check your monitor is still plugged in to the electrical outlet. If you still use the telephone land line to send please ensure the connection to the phone adela is secure. This will help to ensure successful automatic transmissions in the future. Also, the monitor needs to be close to you while sleeping at night. Please be aware that the remote device transmission sites are periodically monitored only during regular business hours during which simultaneous in-office device clinics are being run. If your transmission requires attention, we will contact you as soon as possible. Please also keep in mind that implanted devices should be checked in the office manually once per year. Please call our office if you need to schedule this visit. Thank you.             [facility]

## 2019-04-08 NOTE — PROGRESS NOTES
Merlin transmission shows normal device function. Battery and lead function stable. No VT. PAF - 24 mins, appears new. There is some oversensing on atrial channel but prolonged episode seems real. Will review with EP as not taking AC. See Paceart report under cardiology tab.  mk

## 2019-04-15 ENCOUNTER — TELEPHONE (OUTPATIENT)
Dept: CARDIOLOGY CLINIC | Age: 69
End: 2019-04-15

## 2019-04-15 NOTE — TELEPHONE ENCOUNTER
Tried calling pt on home and cell numbers. Need to set up EP ov for PAF on pacer. Did not leave a message as VM was non specific. Gave to schedulers to set up.

## 2019-05-13 ENCOUNTER — OFFICE VISIT (OUTPATIENT)
Dept: CARDIOLOGY CLINIC | Age: 69
End: 2019-05-13
Payer: MEDICARE

## 2019-05-13 VITALS
WEIGHT: 214.2 LBS | BODY MASS INDEX: 33.55 KG/M2 | DIASTOLIC BLOOD PRESSURE: 80 MMHG | HEART RATE: 64 BPM | SYSTOLIC BLOOD PRESSURE: 139 MMHG

## 2019-05-13 DIAGNOSIS — I10 ESSENTIAL HYPERTENSION: Primary | ICD-10-CM

## 2019-05-13 DIAGNOSIS — I49.5 SSS (SICK SINUS SYNDROME) (HCC): ICD-10-CM

## 2019-05-13 PROCEDURE — 99214 OFFICE O/P EST MOD 30 MIN: CPT | Performed by: NURSE PRACTITIONER

## 2019-05-13 RX ORDER — UBIDECARENONE 50 MG
50 CAPSULE ORAL
COMMUNITY

## 2019-05-13 NOTE — PATIENT INSTRUCTIONS
Plan:  c/o stressed and atypical cp / neg [facility] 2017 neg stress test 2018  She does not want another stress test & she has had recent test   She has appt 6/20  with us for PPM interrogation

## 2019-05-13 NOTE — PROGRESS NOTES
[facility]  Office Visit           Anju Murphy MD,  Therese Cedeno APRN FNP [address] [employer]       Cardiology           Ramone Sides  1/19/1951    May 13, 2019    CC: here with cp       HPI:  The patient is 70 y.o. female with atypical cp wax and wane for days, no specific cause or remedy / non c/o fever cough n/v /appears euvolemic   No pain with activity    stated she is stressed d/t sister ill    [facility] normal  cors 10/2017   / neg stress test in 2018     No SOB no wt gain no PND SHERRI   HTN SSS/ SJM dual chamber PPM      Reviewed most recent test with pt. Review of Systems:  Constitutional: Denies  fatigue, weakness, night sweats or fever. HEENT: Denies new visual changes, ringing in ears, nosebleeds,nasal congestion  Respiratory: Denies new or change in SOB, PND, orthopnea or cough. Cardiovascular: see HPI  GI: Denies N/V, diarrhea, constipation, abdominal pain, change in bowel habits, melena or hematochezia  : Denies urinary frequency, urgency, incontinence, hematuria or dysuria. Skin: Denies rash, hives, or cyanosis  Musculoskeletal: Denies joint or muscle aches/pain  Neurological: Denies syncope or TIA-like symptoms. Psychiatric: Denies anxiety, insomnia or depression     History reviewed. No pertinent past medical history. History reviewed. No pertinent surgical history. No family history on file.   Social History     Tobacco Use    Smoking status: Never Smoker    Smokeless tobacco: Never Used   Substance Use Topics    Alcohol use: Not on file    Drug use: Not on file       Allergies   Allergen Reactions    Morphine Nausea And Vomiting     Current Outpatient Medications   Medication Sig Dispense Refill    Coenzyme Q10 50 MG CAPS Take 50 mg by mouth      isosorbide-hydrALAZINE (BIDIL) 20-37.5 MG per tablet Take 0.5 tablets by mouth 2 times daily 60 tablet 11    ranitidine (ZANTAC) 150 MG tablet       calcium carbonate (OSCAL) 500 MG TABS tablet Take 500 mg by mouth daily      vitamin B-12 (CYANOCOBALAMIN) 100 MCG tablet Take 50 mcg by mouth daily      Multiple Vitamins-Minerals (THERAPEUTIC MULTIVITAMIN-MINERALS) tablet Take 1 tablet by mouth daily       No current facility-administered medications for this visit. Physical Exam:   /80   Pulse 64   Wt 214 lb 3.2 oz (97.2 kg)   BMI 33.55 kg/m²   BP Readings from Last 3 Encounters:   05/13/19 139/80   11/02/18 118/68   09/13/18 122/68     Pulse Readings from Last 3 Encounters:   05/13/19 64   11/02/18 60   09/13/18 68     Wt Readings from Last 3 Encounters:   05/13/19 214 lb 3.2 oz (97.2 kg)   11/02/18 211 lb (95.7 kg)   09/13/18 216 lb (98 kg)     Constitutional: She is oriented to person, place, and time. She appears well-developed and well-nourished. In no acute distress. HEENT: Normocephalic and atraumatic. Sclerae anicteric. No xanthelasmas. Conjunctiva white, no subconjunctival hemorrhage   External inspection of ears nose teeth & gums   Eyes:PERRLA EOM's intact. Neck: Neck supple. No JVD present. Carotids without bruits. No mass and no thyromegaly present. No lymphadenopathy present. Cardiovascular: RRR, normal S1 and S2; no murmur/gallop or rub, PMI nondisplaced  Pulmonary/Chest: Effort normal.  Lungs clear to auscultation. Chest wall nontender  Abdominal: soft, nontender, nondistended. + bowel sounds; no organomegaly or bruits. Aorta normal  Extremities: No edema, cyanosis, or clubbing. Pulses are 2+ radial/carotid/dorsalis pedis bilaterally. Cap refill brisk. Neurological: No cranial nerve deficit. Psychiatric: She has a normal mood and affect.  Her speech is normal and behavior is normal.     Lab Review:   No results found for: TRIG, HDL, LDLCALC, LDLDIRECT, LABVLDL  Lab Results   Component Value Date     01/14/2016    K 4.4 01/14/2016     01/14/2016    CO2 26 01/14/2016    BUN 11 01/14/2016    CREATININE 0.8 01/14/2016    GLUCOSE 90 01/14/2016    CALCIUM 10.1 01/14/2016      Lab Results   Component Value Date    WBC 6.3 01/14/2016    HGB 12.4 01/14/2016    HCT 38.5 01/14/2016    MCV 87.7 01/14/2016     01/14/2016         I have reviewed any available labs, images, any stress test, [facility] on this pt       Please cc this note to PCP    Assessment:    atypical cp wax and wane for days, no specific cause or remedy / non c/o fever cough n/v / appears  euvolemic   stated she is stressed d/t sister ill    [facility] normal  cors 10/2017   / neg stress test in 2018     HTN  optimal    SSS/ SJM dual chamber PPM    11/2018   SJM  Interrogation [facility] dual chamber ACCENT   assessed & reprogrammed   Batter life good  Noise noted on lead / assessed by rep   A paced/ 85%  V paced<1%   afib <1% / 22 sec afib but looks like noise     SHERRI   usues CPAP regularly       Plan:  c/o stressed and atypical cp / neg [facility] 2017 neg stress test 2018  She does not want another stress test & she has had recent test   She has appt 6/20  with us for PPM interrogation      Thanks for allowing me to participate in the care of this patient.   Please cc this note to PCP      TATYANA Romeo

## 2019-06-20 ENCOUNTER — OFFICE VISIT (OUTPATIENT)
Dept: CARDIOLOGY CLINIC | Age: 69
End: 2019-06-20
Payer: MEDICARE

## 2019-06-20 ENCOUNTER — NURSE ONLY (OUTPATIENT)
Dept: CARDIOLOGY CLINIC | Age: 69
End: 2019-06-20

## 2019-06-20 VITALS
WEIGHT: 214.8 LBS | HEART RATE: 63 BPM | SYSTOLIC BLOOD PRESSURE: 118 MMHG | BODY MASS INDEX: 33.64 KG/M2 | DIASTOLIC BLOOD PRESSURE: 70 MMHG

## 2019-06-20 DIAGNOSIS — R07.9 CHEST PAIN, UNSPECIFIED TYPE: ICD-10-CM

## 2019-06-20 DIAGNOSIS — I10 ESSENTIAL HYPERTENSION: Primary | ICD-10-CM

## 2019-06-20 DIAGNOSIS — I49.5 SSS (SICK SINUS SYNDROME) (HCC): ICD-10-CM

## 2019-06-20 DIAGNOSIS — Z95.0 PACEMAKER: ICD-10-CM

## 2019-06-20 PROCEDURE — 99214 OFFICE O/P EST MOD 30 MIN: CPT | Performed by: INTERNAL MEDICINE

## 2019-06-20 NOTE — PROGRESS NOTES
[address]  Office Visit           Ignacia Brewster MD,  [address] APRN [facility]       Cardiology           Teresa Luna  1/19/1951 June 20, 2019         HPI:  The patient is 70 y.o. female with is here for evaluation and follow-up of her overall cardiac status. Additionally we were to interrogate her pacemaker device. Her only complaint today is burning in her left calf laterally. I do not find any abnormalities in her pulses in her left foot a very very good and strong. The lungs are clear extremities show no edema. We did interrogate her device and it is a [place] [employer] dual-chamber permanent pacemaker. There is some atrial lead noise and the impedance is about 200. She has about 4-1/2 years of battery life and she has been experiencing some vibration alert with her device. We did turn off that alert and she is clinically stable. Rhythm and otherwise are stable. [facility] normal  cors 10/2017   / neg stress test in 2018     No SOB no wt gain no PND SHERRI   HTN SSS/ SJM dual chamber PPM      Reviewed most recent test with pt. Review of Systems:  Constitutional: Denies  fatigue, weakness, night sweats or fever. HEENT: Denies new visual changes, ringing in ears, nosebleeds,nasal congestion  Respiratory: Denies new or change in SOB, PND, orthopnea or cough. Cardiovascular: see HPI  GI: Denies N/V, diarrhea, constipation, abdominal pain, change in bowel habits, melena or hematochezia  : Denies urinary frequency, urgency, incontinence, hematuria or dysuria. Skin: Denies rash, hives, or cyanosis  Musculoskeletal: Denies joint or muscle aches/pain  Neurological: Denies syncope or TIA-like symptoms. Psychiatric: Denies anxiety, insomnia or depression     No past medical history on file. No past surgical history on file. No family history on file.   Social History     Tobacco Use    Smoking status: Never Smoker    Smokeless tobacco: Never Used Substance Use Topics    Alcohol use: Not on file    Drug use: Not on file       Allergies   Allergen Reactions    Morphine Nausea And Vomiting     Current Outpatient Medications   Medication Sig Dispense Refill    Coenzyme Q10 50 MG CAPS Take 50 mg by mouth      isosorbide-hydrALAZINE (BIDIL) 20-37.5 MG per tablet Take 0.5 tablets by mouth 2 times daily 60 tablet 11    ranitidine (ZANTAC) 150 MG tablet       calcium carbonate (OSCAL) 500 MG TABS tablet Take 500 mg by mouth daily      vitamin B-12 (CYANOCOBALAMIN) 100 MCG tablet Take 50 mcg by mouth daily      Multiple Vitamins-Minerals (THERAPEUTIC MULTIVITAMIN-MINERALS) tablet Take 1 tablet by mouth daily       No current facility-administered medications for this visit. Physical Exam:   There were no vitals taken for this visit. BP Readings from Last 3 Encounters:   06/20/19 118/70   05/13/19 139/80   11/02/18 118/68     Pulse Readings from Last 3 Encounters:   06/20/19 63   05/13/19 64   11/02/18 60     Wt Readings from Last 3 Encounters:   06/20/19 214 lb 12.8 oz (97.4 kg)   05/13/19 214 lb 3.2 oz (97.2 kg)   11/02/18 211 lb (95.7 kg)     Constitutional: She is oriented to person, place, and time. She appears well-developed and well-nourished. In no acute distress. HEENT: Normocephalic and atraumatic. Sclerae anicteric. No xanthelasmas. Conjunctiva white, no subconjunctival hemorrhage   External inspection of ears nose teeth & gums   Eyes:PERRLA EOM's intact. Neck: Neck supple. No JVD present. Carotids without bruits. No mass and no thyromegaly present. No lymphadenopathy present. Cardiovascular: RRR, normal S1 and S2; no murmur/gallop or rub, PMI nondisplaced  Pulmonary/Chest: Effort normal.  Lungs clear to auscultation. Chest wall nontender  Abdominal: soft, nontender, nondistended. + bowel sounds; no organomegaly or bruits. Aorta normal  Extremities: No edema, cyanosis, or clubbing.  Pulses are 2+ radial/carotid/dorsalis pedis bilaterally. Cap refill brisk. Neurological: No cranial nerve deficit. Psychiatric: She has a normal mood and affect. Her speech is normal and behavior is normal.     Lab Review:   No results found for: TRIG, HDL, LDLCALC, LDLDIRECT, LABVLDL  Lab Results   Component Value Date     01/14/2016    K 4.4 01/14/2016     01/14/2016    CO2 26 01/14/2016    BUN 11 01/14/2016    CREATININE 0.8 01/14/2016    GLUCOSE 90 01/14/2016    CALCIUM 10.1 01/14/2016      Lab Results   Component Value Date    WBC 6.3 01/14/2016    HGB 12.4 01/14/2016    HCT 38.5 01/14/2016    MCV 87.7 01/14/2016     01/14/2016       Assessment:    Pacemaker leads seems to potentially have some decreased impedance. I think they are still working adequately. And she is not dependent on the pacemaker at this time. We will continue to monitor her. She has about 4 and half years of battery life. Regarding the burning in her left calf I think it may be some type of neuropathy of sorts. She does not have diabetes and her last labs done late April 2019 were unremarkable. [facility] normal  cors 10/2017   / neg stress test in 2018     HTN  optimal    SSS/ SJM dual chamber PPM    11/2018   SJM  Interrogation [place] dual chamber ACCENT   assessed & reprogrammed   Batter life good  Noise noted on lead / assessed by rep   A paced/ 85%  V paced<1%   afib <1% / 22 sec afib but looks like noise     SHERRI   usues CPAP regularly       Plan: At this time I think all looks well from a cardiac perspective. I see no reason to do any further intervention heart wise at this time. We did make adjustments in her pacemaker and we turned off the vibratory alert. We will continue to watch for deterioration of the lead impedance. She is not dependent on the device therefore we do not need to make any additional specific changes. Return to see me in 6 months. Ion      Thanks for allowing me to participate in the care of this patient.   Please cc this note to Dr. Donte Pacheco.   Rukhsana Waller MD, [facility]

## 2019-07-01 DIAGNOSIS — I25.9 CHEST PAIN DUE TO MYOCARDIAL ISCHEMIA, UNSPECIFIED ISCHEMIC CHEST PAIN TYPE: ICD-10-CM

## 2019-07-01 DIAGNOSIS — Z95.0 PACEMAKER: ICD-10-CM

## 2019-07-01 DIAGNOSIS — I10 ESSENTIAL HYPERTENSION: ICD-10-CM

## 2019-07-01 DIAGNOSIS — I49.5 SSS (SICK SINUS SYNDROME) (HCC): ICD-10-CM

## 2019-07-01 RX ORDER — ISOSORBIDE DINITRATE AND HYDRALAZINE HYDROCHLORIDE 37.5; 2 MG/1; MG/1
0.51 TABLET ORAL 2 TIMES DAILY
Qty: 60 TABLET | Refills: 5 | Status: SHIPPED | OUTPATIENT
Start: 2019-07-01 | End: 2019-07-02 | Stop reason: SDUPTHER

## 2019-07-02 ENCOUNTER — TELEPHONE (OUTPATIENT)
Dept: CARDIOLOGY CLINIC | Age: 69
End: 2019-07-02

## 2019-07-02 ENCOUNTER — NURSE ONLY (OUTPATIENT)
Dept: CARDIOLOGY CLINIC | Age: 69
End: 2019-07-02
Payer: MEDICARE

## 2019-07-02 DIAGNOSIS — I25.9 CHEST PAIN DUE TO MYOCARDIAL ISCHEMIA, UNSPECIFIED ISCHEMIC CHEST PAIN TYPE: ICD-10-CM

## 2019-07-02 DIAGNOSIS — I10 ESSENTIAL HYPERTENSION: ICD-10-CM

## 2019-07-02 DIAGNOSIS — I49.5 SSS (SICK SINUS SYNDROME) (HCC): ICD-10-CM

## 2019-07-02 DIAGNOSIS — Z95.0 PACEMAKER: ICD-10-CM

## 2019-07-02 PROCEDURE — 93294 REM INTERROG EVL PM/LDLS PM: CPT | Performed by: INTERNAL MEDICINE

## 2019-07-02 PROCEDURE — 93296 REM INTERROG EVL PM/IDS: CPT | Performed by: INTERNAL MEDICINE

## 2019-07-02 RX ORDER — HYDRALAZINE HYDROCHLORIDE AND ISOSORBIDE DINITRATE 37.5; 2 MG/1; MG/1
TABLET, FILM COATED ORAL
Qty: 30 TABLET | Refills: 5 | Status: SHIPPED | OUTPATIENT
Start: 2019-07-02 | End: 2019-09-17 | Stop reason: SDUPTHER

## 2019-07-02 NOTE — TELEPHONE ENCOUNTER
Laura Ellsworth called back to give Kim Crowley her pharmacy information.  She would like for her medication to be sent  [phone]

## 2019-07-03 RX ORDER — HYDRALAZINE HYDROCHLORIDE AND ISOSORBIDE DINITRATE 37.5; 2 MG/1; MG/1
TABLET, FILM COATED ORAL
Qty: 30 TABLET | Refills: 5 | Status: SHIPPED | OUTPATIENT
Start: 2019-07-03 | End: 2020-03-02 | Stop reason: SDUPTHER

## 2019-07-05 ENCOUNTER — TELEPHONE (OUTPATIENT)
Dept: CARDIOLOGY CLINIC | Age: 69
End: 2019-07-05

## 2019-07-05 NOTE — TELEPHONE ENCOUNTER
Patient called stating she had chest pain while her sister was in hospital at [facility] in [place]. She said she had angiogram and some other tests. They asked her to follow up with her cardiologist. She said to obtain those records address and phone number is:    [facility]. [facility], [address] [place] [address]    Ph. [phone]  Fax.  [phone]    Appointment scheduled for Monday 7/8/19 @ 9:00 w/Dr. Meredith Gustafson

## 2019-07-08 ENCOUNTER — OFFICE VISIT (OUTPATIENT)
Dept: CARDIOLOGY CLINIC | Age: 69
End: 2019-07-08
Payer: MEDICARE

## 2019-07-08 VITALS
SYSTOLIC BLOOD PRESSURE: 136 MMHG | DIASTOLIC BLOOD PRESSURE: 70 MMHG | BODY MASS INDEX: 33.3 KG/M2 | WEIGHT: 212.6 LBS | HEART RATE: 60 BPM

## 2019-07-08 DIAGNOSIS — Z95.0 PACEMAKER: ICD-10-CM

## 2019-07-08 DIAGNOSIS — I10 ESSENTIAL HYPERTENSION: Primary | ICD-10-CM

## 2019-07-08 DIAGNOSIS — I49.5 SSS (SICK SINUS SYNDROME) (HCC): ICD-10-CM

## 2019-07-08 PROCEDURE — 99214 OFFICE O/P EST MOD 30 MIN: CPT | Performed by: INTERNAL MEDICINE

## 2019-07-08 NOTE — PROGRESS NOTES
[address]  Office Visit           Ihsan Long MD,  [facility]                      Cardiology           Teresa Mckenna  1/19/1951 July 8, 2019         HPI:  The patient is 70 y.o. female with is here for evaluation and follow-up cardiac cath that was done in [facility]. On June 28, 2019 she was there taking care of the house of her very ill sister. She developed chest pains and a stress test was positive and a cardiac cath was done from the right radial and apparently negative for ischemia with normal ejection fraction. She does have a pacemaker device and apparently it was not interrogated. [facility] normal  cors 10/2017   / neg stress test in 2018   Left heart cath June 28, 2019 normal coronaries  No SOB no wt gain no PND SHERRI   HTN SSS/ SJM dual chamber PPM      Reviewed most recent test with pt. Review of Systems:  Constitutional: Denies  fatigue, weakness, night sweats or fever. HEENT: Denies new visual changes, ringing in ears, nosebleeds,nasal congestion  Respiratory: Denies new or change in SOB, PND, orthopnea or cough. Cardiovascular: see HPI  GI: Denies N/V, diarrhea, constipation, abdominal pain, change in bowel habits, melena or hematochezia  : Denies urinary frequency, urgency, incontinence, hematuria or dysuria. Skin: Denies rash, hives, or cyanosis  Musculoskeletal: Denies joint or muscle aches/pain  Neurological: Denies syncope or TIA-like symptoms. Psychiatric: Denies anxiety, insomnia or depression     No past medical history on file. No past surgical history on file. No family history on file.   Social History     Tobacco Use    Smoking status: Never Smoker    Smokeless tobacco: Never Used   Substance Use Topics    Alcohol use: Not on file    Drug use: Not on file       Allergies   Allergen Reactions    Morphine Nausea And Vomiting     Current Outpatient Medications   Medication Sig Dispense Refill    BIDIL 20-37.5 MG per tablet TAKE ONE-HALF

## 2019-07-08 NOTE — PROGRESS NOTES
Merlin transmission shows normal device function. Battery and lead function stable. See VHR, ?NSVT. Previously noted PAF seems to have gone unaddressed. I attempted to set the pt up for EP OV, see prior notes, but this does not seem to have occurred. No OAC. There also seems to be lead noise. It appears that Dr Landon Sexton is following her pacer and he reports stable impedances. See Paceart report under cardiology tab. Recheck 3 mos.  mk

## 2019-07-16 ENCOUNTER — TELEPHONE (OUTPATIENT)
Dept: PHARMACY | Facility: CLINIC | Age: 69
End: 2019-07-16

## 2019-07-16 NOTE — LETTER
Facility Name: [facility] [address] Fax Number: [phone]      Patient Name: Zaina Dsouza    YOB: 1951      To Whom It May Concern,     Our team conducts post-discharge medication reviews for patients who see [facility]) providers. We are looking for a discharge medication list for the patient listed above. A pharmacist from our team will call the patient to review medications and make sure everything is up-to-date in our system from their stay at your facility. Please fax the discharge medication list to [phone]. Please call [phone] Option #7 if you have any questions. Thank you,    [facility] Team  Telephone [phone] Option #7   Fax [phone].

## 2019-07-18 ENCOUNTER — TELEPHONE (OUTPATIENT)
Dept: CARDIOLOGY CLINIC | Age: 69
End: 2019-07-18

## 2019-07-20 RX ORDER — ASPIRIN 325 MG
325 TABLET, DELAYED RELEASE (ENTERIC COATED) ORAL EVERY 6 HOURS PRN
Qty: 30 TABLET | Refills: 5 | Status: SHIPPED | OUTPATIENT
Start: 2019-07-20 | End: 2019-09-17

## 2019-08-19 ENCOUNTER — TELEPHONE (OUTPATIENT)
Dept: CARDIOLOGY CLINIC | Age: 69
End: 2019-08-19

## 2019-09-12 NOTE — PROGRESS NOTES
[address]   Cardiac Electrophysiology Consultation   Date: 9/17/2019  Reason for Consultation: Atrial Fibrillation  Consult Requesting Physician: No att. providers found     Chief Complaint:   Chief Complaint   Patient presents with    Atrial Fibrillation    Hypertension      HPI: Reyes Gey is a 70 y.o. referred by the device clinic for new onset AF. PMH significant for HTN, HLD, and SSS s/p SJM dual chamber PPM originally in 2005 with generator change on 7/31/14 (Dr. Hyacinth Avery). Prior interrogations have shown stable atrial lead impedence; however, the interrogation in 6/2019 showed clear AF with longest episode being almost 2 hrs. Of note, pt was visiting her sister in [address] and had c/o CP. Following a positive stress test, she underwent a LHC (in [address]) that showed normal coronaries. Interval History: Today, device interrogation today shows normally functioning PPM.  AF burden <1%. On 8/18/19, she was noted to have true AF RVR, lasting 45 min. She cannot remember how she felt on this particular day. However, she does report having \"good and bad days. \"  She denies any history of palpitations, chest pain or shortness of breath. Atrial Fibrillation:    BMI    :   Body mass index is 34.58 kg/m². Duration   :   6/2019    Symptoms   :  Shortness of breath, palpitations, easy fatigability, dyspnea on exertion, decline in his functional capacity    Previous DCCV :   none    Previous AAD           :    none    Beta blocker  :   none    ACE / ARB  :   none    OAC   :   none    LVEF    :   45-50%    Left atrial size :   3.9 cm    SHERRI   :   Yes, on CPAP    No past medical history on file. No past surgical history on file. Allergies: Allergies   Allergen Reactions    Contrast [Iodides] Nausea And Vomiting    Morphine Nausea And Vomiting       Medication:   Prior to Admission medications    Medication Sig Start Date End Date Taking?  Authorizing Provider   aspirin coronaries (see complete report under Media)    The MCOT, echocardiogram, stress test, and coronary angiography/PCI were reviewed by myself and used for my plan of care. Procedures:  1.  none    Assessment/Plan:  1. PAF  2. SSS s/p dual chamber PPM   3. HTN  4. SHERRI on CPAP    EF 45-50% (9/2018)  PAF seen on device, longest episode lasting 45 min. Not currently on [facility]    I discussed anticoagulation to decrease the risk of thromboembolic events including stroke. Benefits and alternatives were discussed with patient. Risk of bleeding was discussed. Patient verbalized understanding. Different forms of anticoagulants including Eliquis and warfarin were discussed. Patient opted to start with Eliquis. Stop ASA  Start Eliquis 5 mg BID for stroke prevention  Continue Bilil 20-37.5 mg daily (hydralazine/nitrates)    Follow up in 6 months with Kinza Borjas NP    Thank you for allowing me to participate in the care of [address] [place] [address]. All questions and concerns were addressed to the patient/family. Alternatives to my treatment were discussed. Corazon Burton RN, am scribing for and in the presence of Dr. Migel Flowers. 09/17/19 10:03 AM  Oj Cordova RN    I, Mikie Willson MD, personally performed the services prescribed in this documentation as scribed by Ms. Oj Cordova RN in my presence and it is both accurate and complete.        Mikie Willson MD  Cardiac Electrophysiology  [address]

## 2019-09-17 ENCOUNTER — NURSE ONLY (OUTPATIENT)
Dept: CARDIOLOGY CLINIC | Age: 69
End: 2019-09-17
Payer: MEDICARE

## 2019-09-17 ENCOUNTER — OFFICE VISIT (OUTPATIENT)
Dept: CARDIOLOGY CLINIC | Age: 69
End: 2019-09-17
Payer: MEDICARE

## 2019-09-17 VITALS
SYSTOLIC BLOOD PRESSURE: 144 MMHG | HEART RATE: 63 BPM | DIASTOLIC BLOOD PRESSURE: 82 MMHG | WEIGHT: 220.8 LBS | HEIGHT: 67 IN | BODY MASS INDEX: 34.65 KG/M2

## 2019-09-17 DIAGNOSIS — I10 ESSENTIAL HYPERTENSION: ICD-10-CM

## 2019-09-17 DIAGNOSIS — I49.5 SSS (SICK SINUS SYNDROME) (HCC): ICD-10-CM

## 2019-09-17 DIAGNOSIS — Z95.0 PACEMAKER: ICD-10-CM

## 2019-09-17 DIAGNOSIS — I48.0 PAROXYSMAL ATRIAL FIBRILLATION (HCC): Primary | ICD-10-CM

## 2019-09-17 PROCEDURE — 99204 OFFICE O/P NEW MOD 45 MIN: CPT | Performed by: INTERNAL MEDICINE

## 2019-09-17 PROCEDURE — 93000 ELECTROCARDIOGRAM COMPLETE: CPT | Performed by: INTERNAL MEDICINE

## 2019-09-17 PROCEDURE — 93280 PM DEVICE PROGR EVAL DUAL: CPT | Performed by: INTERNAL MEDICINE

## 2019-10-08 ENCOUNTER — NURSE ONLY (OUTPATIENT)
Dept: CARDIOLOGY CLINIC | Age: 69
End: 2019-10-08
Payer: MEDICARE

## 2019-10-08 DIAGNOSIS — Z95.0 PACEMAKER: ICD-10-CM

## 2019-10-08 DIAGNOSIS — I49.5 SSS (SICK SINUS SYNDROME) (HCC): ICD-10-CM

## 2019-10-08 PROCEDURE — 93294 REM INTERROG EVL PM/LDLS PM: CPT | Performed by: INTERNAL MEDICINE

## 2019-10-08 PROCEDURE — 93296 REM INTERROG EVL PM/IDS: CPT | Performed by: INTERNAL MEDICINE

## 2019-11-15 ENCOUNTER — TELEPHONE (OUTPATIENT)
Dept: CARDIOLOGY CLINIC | Age: 69
End: 2019-11-15

## 2019-12-20 ENCOUNTER — TELEPHONE (OUTPATIENT)
Dept: CARDIOLOGY CLINIC | Age: 69
End: 2019-12-20

## 2020-01-07 ENCOUNTER — NURSE ONLY (OUTPATIENT)
Dept: CARDIOLOGY CLINIC | Age: 70
End: 2020-01-07
Payer: MEDICARE

## 2020-01-07 PROCEDURE — 93296 REM INTERROG EVL PM/IDS: CPT | Performed by: INTERNAL MEDICINE

## 2020-01-07 PROCEDURE — 93294 REM INTERROG EVL PM/LDLS PM: CPT | Performed by: INTERNAL MEDICINE

## 2020-01-07 NOTE — LETTER
[address] [facility] [phone]  Patricio Menon nelia [address]    Pacemaker/Defibrillator Clinic          01/07/20        Cosme Pierce  [address] [facility]        Dear Cosme Pierce    This letter is to inform you that we received the transmission from your monitor at home that checks your implanted heart device. The next date your monitor will automatically transmit will be 6/18/20. If your report needs attention we will notify you. Your device and monitor are wireless and most transmit cellularly, but please periodically check your monitor is still plugged in to the electrical outlet. If you still use the telephone land line to send please ensure the connection to the phone adela is secure. This will help to ensure successful automatic transmissions in the future. Also, the monitor needs to be close to you while sleeping at night. Please be aware that the remote device transmission sites are periodically monitored only during regular business hours during which simultaneous in-office device clinics are being run. If your transmission requires attention, we will contact you as soon as possible. Thank you. We will check your device in office when you see Earline on 3/18.           Sandrine

## 2020-01-07 NOTE — PROGRESS NOTES
Merlin Remote transmission of pacemaker and/or ICD, or implanted heart monitor shows normal cardiac device function. Hx PAF-OAC. [place] <1%. PAF/AT recorded  Noise reversion noted-Hx of atrial and ventricular noise recorded. Lead trends have been stable. OV NPFW 3/18.

## 2020-01-16 ENCOUNTER — TELEPHONE (OUTPATIENT)
Dept: CARDIOLOGY CLINIC | Age: 70
End: 2020-01-16

## 2020-01-29 ENCOUNTER — HOSPITAL ENCOUNTER (OUTPATIENT)
Dept: GENERAL RADIOLOGY | Age: 70
Discharge: HOME | End: 2020-01-29
Payer: MEDICARE

## 2020-01-29 ENCOUNTER — HOSPITAL ENCOUNTER (OUTPATIENT)
Age: 70
Discharge: HOME | End: 2020-01-29
Payer: MEDICARE

## 2020-01-29 ENCOUNTER — OFFICE VISIT (OUTPATIENT)
Dept: CARDIOLOGY CLINIC | Age: 70
End: 2020-01-29
Payer: MEDICARE

## 2020-01-29 ENCOUNTER — NURSE ONLY (OUTPATIENT)
Dept: CARDIOLOGY CLINIC | Age: 70
End: 2020-01-29
Payer: MEDICARE

## 2020-01-29 VITALS
DIASTOLIC BLOOD PRESSURE: 82 MMHG | SYSTOLIC BLOOD PRESSURE: 130 MMHG | BODY MASS INDEX: 34.12 KG/M2 | HEART RATE: 60 BPM | HEIGHT: 67 IN | WEIGHT: 217.4 LBS

## 2020-01-29 PROCEDURE — 93000 ELECTROCARDIOGRAM COMPLETE: CPT | Performed by: NURSE PRACTITIONER

## 2020-01-29 PROCEDURE — 99214 OFFICE O/P EST MOD 30 MIN: CPT | Performed by: NURSE PRACTITIONER

## 2020-01-29 PROCEDURE — 93280 PM DEVICE PROGR EVAL DUAL: CPT | Performed by: INTERNAL MEDICINE

## 2020-01-29 PROCEDURE — 71046 X-RAY EXAM CHEST 2 VIEWS: CPT

## 2020-01-29 SDOH — HEALTH STABILITY: MENTAL HEALTH: HOW OFTEN DO YOU HAVE A DRINK CONTAINING ALCOHOL?: NEVER

## 2020-01-29 NOTE — PROGRESS NOTES
[address]   Electrophysiology  Office Visit  Date: 1/29/2020    Chief Complaint   Patient presents with    Atrial Fibrillation    Bradycardia       Cardiac HX: Daniel Bal is a 78 y.o. woman with h/o HTN, HLD, SSS, SHERRI on CPAP, s/p dual chamber SJM PPM (Dr. Fracisco Singh, 7/31/14), recent dx of AF on PPM, had been in [facility] [place], [place] visiting her sister, developed CP, went to ED, + MPI, s/p LHC (6/2019) showed nor cors. In f/u noted to have 45 min of AF on PPM device check. Placed on Eliquis. Interval History/HPI: Patient is here for 6 month f/u. Device check today shows evidence of AT/AF that lasted about 2 min 28 seconds in length, there are multiple episodes lisited as AT/AF that appear to be noise on both atrial and ventricular leads. Patient does feel occasional palpitations. She did have an episode the other day where she felt palpitations and also felt a little dizziness/lightheadedness at the same time. She denies chest pain or shortness of breath. Home medications:   Current Outpatient Medications on File Prior to Visit   Medication Sig Dispense Refill    BIDIL 20-37.5 MG per tablet TAKE ONE-HALF TABLET BY MOUTH TWICE A DAY 30 tablet 5    Coenzyme Q10 50 MG CAPS Take 50 mg by mouth      calcium carbonate (OSCAL) 500 MG TABS tablet Take 500 mg by mouth daily      vitamin B-12 (CYANOCOBALAMIN) 100 MCG tablet Take 50 mcg by mouth daily      Multiple Vitamins-Minerals (THERAPEUTIC MULTIVITAMIN-MINERALS) tablet Take 1 tablet by mouth daily       No current facility-administered medications on file prior to visit. Past Medical History:   Diagnosis Date    Chronic kidney disease     Hypertension         Past Surgical History:   Procedure Laterality Date    CARDIAC PACEMAKER PLACEMENT      PACEMAKER PLACEMENT         Allergies   Allergen Reactions    Contrast [Iodides] Nausea And Vomiting    Morphine Nausea And Vomiting       Social History:  Reviewed.   reports that she has never smoked. She has never used smokeless tobacco. She reports that she does not drink alcohol or use drugs. Family History:  Reviewed. family history includes Diabetes in her mother; Heart Disease in her mother; Heart Failure in her sister. Review of System:    · Constitutional: No fevers, chills. · Eyes: No visual changes or diplopia. No scleral icterus. · ENT: No Headaches. No mouth sores or sore throat. · Cardiovascular: No for chest pain, No for dyspnea on exertion, Yes for palpitations or No for loss of consciousness. No cough, hemoptysis, No for pleuritic pain, or phlebitis. · Respiratory: No for cough or wheezing. No hematemesis. · Gastrointestinal: No abdominal pain, blood in stools. · Genitourinary: No dysuria, or hematuria. · Musculoskeletal: No gait disturbance,    · Integumentary: No rash or pruritis. · Neurological: No headache, change in muscle strength, numbness or tingling. · Psychiatric: No anxiety, or depression. · Endocrine: No temperature intolerance. No excessive thirst, fluid intake, or urination. · Hem/Lymph: No abnormal bruising or bleeding, blood clots or swollen lymph nodes. · Allergic/Immunologic: No nasal congestion or hives. Physical Examination:  Vitals:    01/29/20 1412   BP: 130/82   Pulse: 60         Wt Readings from Last 3 Encounters:   01/29/20 217 lb 6.4 oz (98.6 kg)   09/17/19 220 lb 12.8 oz (100.2 kg)   07/08/19 212 lb 9.6 oz (96.4 kg)       · Constitutional: Oriented. No distress. · Head: Normocephalic and atraumatic. · Mouth/Throat: Oropharynx is clear and moist.   · Eyes: Conjunctivae clear without jaunduice. PERRL. · Neck: Neck supple. No rigidity. No JVD present. · Cardiovascular: Normal rate, regular rhythm, S1&S2. · Pulmonary/Chest: Bilateral respiratory sounds. No wheezes, No rhonchi. · Abdominal: Soft. Bowel sounds present. No distension, No tenderness. · Musculoskeletal: No tenderness.  No edema    · Lymphadenopathy: Has no

## 2020-01-30 ENCOUNTER — TELEPHONE (OUTPATIENT)
Dept: CARDIOLOGY CLINIC | Age: 70
End: 2020-01-30

## 2020-01-30 NOTE — TELEPHONE ENCOUNTER
----- Message from MARIA ELENA Navarrete Che, CNP sent at 1/30/2020  1:29 PM EST -----  Patients appmt needs to be moved with Dr. Lesvia Burrows - 4 weeks - up to look at her device for a possible lead revision. My appmt can be canceled.

## 2020-01-30 NOTE — TELEPHONE ENCOUNTER
Left a message for patient to call the office. We need to move patient's appt with Dr. Powers [place] up with device check. Awaiting call back.

## 2020-02-14 ENCOUNTER — OFFICE VISIT (OUTPATIENT)
Dept: CARDIOLOGY CLINIC | Age: 70
End: 2020-02-14
Payer: MEDICARE

## 2020-02-14 VITALS
SYSTOLIC BLOOD PRESSURE: 130 MMHG | BODY MASS INDEX: 33.99 KG/M2 | DIASTOLIC BLOOD PRESSURE: 70 MMHG | HEART RATE: 72 BPM | WEIGHT: 217 LBS

## 2020-02-14 PROCEDURE — 3017F COLORECTAL CA SCREEN DOC REV: CPT | Performed by: INTERNAL MEDICINE

## 2020-02-14 PROCEDURE — 4040F PNEUMOC VAC/ADMIN/RCVD: CPT | Performed by: INTERNAL MEDICINE

## 2020-02-14 PROCEDURE — G8400 PT W/DXA NO RESULTS DOC: HCPCS | Performed by: INTERNAL MEDICINE

## 2020-02-14 PROCEDURE — 1123F ACP DISCUSS/DSCN MKR DOCD: CPT | Performed by: INTERNAL MEDICINE

## 2020-02-14 PROCEDURE — G8427 DOCREV CUR MEDS BY ELIG CLIN: HCPCS | Performed by: INTERNAL MEDICINE

## 2020-02-14 PROCEDURE — G8484 FLU IMMUNIZE NO ADMIN: HCPCS | Performed by: INTERNAL MEDICINE

## 2020-02-14 PROCEDURE — 99214 OFFICE O/P EST MOD 30 MIN: CPT | Performed by: INTERNAL MEDICINE

## 2020-02-14 PROCEDURE — 1036F TOBACCO NON-USER: CPT | Performed by: INTERNAL MEDICINE

## 2020-02-14 PROCEDURE — 1090F PRES/ABSN URINE INCON ASSESS: CPT | Performed by: INTERNAL MEDICINE

## 2020-02-14 PROCEDURE — G8417 CALC BMI ABV UP PARAM F/U: HCPCS | Performed by: INTERNAL MEDICINE

## 2020-02-14 NOTE — PROGRESS NOTES
[facility]  Office Visit           Jose Alarcon MD,  [facility]                      Cardiology           Lex Art  1/19/1951 February 14, 2020         HPI:  The patient is 78 y.o. female with is here for evaluation here for evaluation for cardiac status and her pacemaker device. Apparently did have a recent flu and is recovering from that. Aches and pains all over but no specific cardiac issues. No blackouts or day postnasal syncope. Nothing that sounds like ischemia. Chronic atrial fibrillation. Pacemaker seem to be working well. She is having difficulty affording the Eliquis so we will sample her as best we can and consider other options. [facility] normal  cors 10/2017   / neg stress test in 2018   Left heart cath June 28, 2019 normal coronaries  No SOB no wt gain no PND SHERRI   HTN SSS/ SJM dual chamber PPM    Chronic atrial fibrillation on Eliquis        Review of Systems:  Constitutional: Denies  fatigue, weakness, night sweats or fever. HEENT: Denies new visual changes, ringing in ears, nosebleeds,nasal congestion  Respiratory: Denies new or change in SOB, PND, orthopnea or cough. Cardiovascular: see HPI  GI: Denies N/V, diarrhea, constipation, abdominal pain, change in bowel habits, melena or hematochezia  : Denies urinary frequency, urgency, incontinence, hematuria or dysuria. Skin: Denies rash, hives, or cyanosis  Musculoskeletal: Denies joint or muscle aches/pain  Neurological: Denies syncope or TIA-like symptoms.   Psychiatric: Denies anxiety, insomnia or depression     Past Medical History:   Diagnosis Date    Chronic kidney disease     Hypertension      Past Surgical History:   Procedure Laterality Date    CARDIAC PACEMAKER PLACEMENT      PACEMAKER PLACEMENT       Family History   Problem Relation Age of Onset    Diabetes Mother     Heart Disease Mother     Heart Failure Sister      Social History     Tobacco Use    Smoking status: Never Smoker    Smokeless tobacco: Never Used   Substance Use Topics    Alcohol use: Never     Frequency: Never    Drug use: Never       Allergies   Allergen Reactions    Contrast [Iodides] Nausea And Vomiting    Morphine Nausea And Vomiting     Current Outpatient Medications   Medication Sig Dispense Refill    apixaban (ELIQUIS) 5 MG TABS tablet Take 1 tablet by mouth 2 times daily 2 packs of 14 tablets provided to patient 28 tablet 0    BIDIL 20-37.5 MG per tablet TAKE ONE-HALF TABLET BY MOUTH TWICE A DAY 30 tablet 5    Coenzyme Q10 50 MG CAPS Take 50 mg by mouth      calcium carbonate (OSCAL) 500 MG TABS tablet Take 500 mg by mouth daily      vitamin B-12 (CYANOCOBALAMIN) 100 MCG tablet Take 50 mcg by mouth daily      Multiple Vitamins-Minerals (THERAPEUTIC MULTIVITAMIN-MINERALS) tablet Take 1 tablet by mouth daily       No current facility-administered medications for this visit. Physical Exam:   /70   Pulse 72   Wt 217 lb (98.4 kg)   BMI 33.99 kg/m²   BP Readings from Last 3 Encounters:   02/14/20 130/70   01/29/20 130/82   09/17/19 (!) 144/82     Pulse Readings from Last 3 Encounters:   02/14/20 72   01/29/20 60   09/17/19 63     Wt Readings from Last 3 Encounters:   02/14/20 217 lb (98.4 kg)   01/29/20 217 lb 6.4 oz (98.6 kg)   09/17/19 220 lb 12.8 oz (100.2 kg)     Constitutional: She is oriented to person, place, and time. She appears well-developed and well-nourished. In no acute distress. HEENT: Normocephalic and atraumatic. Sclerae anicteric. No xanthelasmas. Conjunctiva white, no subconjunctival hemorrhage   External inspection of ears nose teeth & gums   Eyes:PERRLA EOM's intact. Neck: Neck supple. No JVD present. Carotids without bruits. No mass and no thyromegaly present. No lymphadenopathy present. Cardiovascular: RRR, normal S1 and S2; no murmur/gallop or rub, PMI nondisplaced  Pulmonary/Chest: Effort normal.  Lungs clear to auscultation.  Chest wall nontender  Abdominal: soft, nontender,

## 2020-02-19 NOTE — PROGRESS NOTES
[address]   Cardiac Electrophysiology Consultation   Date: 2/20/2020  Reason for Consultation: Atrial Fibrillation  Consult Requesting Physician: No att. providers found     Chief Complaint:   Chief Complaint   Patient presents with    Atrial Fibrillation      HPI: Cosme Pierce is a 78 y.o. referred by the device clinic for new onset AF. PMH significant for HTN, HLD, and SSS s/p SJM dual chamber PPM originally in 2005 with generator change on 7/31/14 (Dr. Julissa Simmons). Prior interrogations have shown stable atrial lead impedence; however, the interrogation in 6/2019 showed clear AF with longest episode being almost 2 hrs. Of note, pt was visiting her sister in [facility] and had c/o CP. Following a positive stress test, she underwent a LHC (in [place], [place]) that showed normal coronaries. A CXR was done due to concern for noise of the RA and RV leads. Device tracings and interrogation data were very consistent with insulation break - a common finding with tendril lead. Interval History: Today, she is here to discuss a lead revision. She is a pacing 77%. She is not feeling well today, states she was diagnosed with the flu last Friday. Her device interrogation showed at least 370 consult noise reversion since January 29, 2020. Atrial lead impedance is trending down to about 200 ohms. Review of the device tracings showed multiple high-frequency, make and break signals in both the atrial and ventricular rate at non-physiological intervals. Atrial Fibrillation:    BMI    :   Body mass index is 33.39 kg/m².     Duration   :   6/2019    Symptoms   :   Shortness of breath, palpitations, easy fatigability, dyspnea on exertion, decline in his functional capacity    Previous DCCV :   none    Previous AAD           :    none    Beta blocker  :   none    ACE / ARB  :   none    OAC   :   Eliquis    LVEF    :   45-50%    Left atrial size :   3.9 cm    SHERRI   :   Yes, on CPAP    Past Medical History:   Diagnosis Date    Chronic kidney disease     Hypertension         Past Surgical History:   Procedure Laterality Date    CARDIAC PACEMAKER PLACEMENT      PACEMAKER PLACEMENT         Allergies: Allergies   Allergen Reactions    Contrast [Iodides] Nausea And Vomiting    Morphine Nausea And Vomiting       Medication:   Prior to Admission medications    Medication Sig Start Date End Date Taking? Authorizing Provider   apixaban (ELIQUIS) 5 MG TABS tablet Take 1 tablet by mouth 2 times daily 2 packs of 14 tablets provided to patient 1/29/20  Yes MARIA ELENA Flanagan CNP   BIDIL 20-37.5 MG per tablet TAKE ONE-HALF TABLET BY MOUTH TWICE A DAY 7/3/19  Yes MARIA ELENA Cannon CNP   Coenzyme Q10 50 MG CAPS Take 50 mg by mouth   Yes Historical Provider, MD   calcium carbonate (OSCAL) 500 MG TABS tablet Take 500 mg by mouth daily   Yes Historical Provider, MD   vitamin B-12 (CYANOCOBALAMIN) 100 MCG tablet Take 50 mcg by mouth daily   Yes Historical Provider, MD   Multiple Vitamins-Minerals (THERAPEUTIC MULTIVITAMIN-MINERALS) tablet Take 1 tablet by mouth daily   Yes Historical Provider, MD       Social History:   reports that she has never smoked. She has never used smokeless tobacco. She reports that she does not drink alcohol or use drugs. Family History:  family history includes Diabetes in her mother; Heart Disease in her mother; Heart Failure in her sister. Reviewed.  Denies family history of sudden cardiac death, arrhythmia, premature CAD    Review of System:    · General ROS: negative for - chills, fever   · Psychological ROS: negative for - anxiety or depression  · Ophthalmic ROS: negative for - eye pain or loss of vision  · ENT ROS: negative for - epistaxis, headaches, nasal discharge, sore throat   · Allergy and Immunology ROS: negative for - hives, nasal congestion   · Hematological and Lymphatic ROS: negative for - bleeding problems, blood clots, bruising or jaundice  · Endocrine ROS: negative for - skin changes, temperature intolerance or unexpected weight changes  · Respiratory ROS: negative for - cough, hemoptysis, pleuritic pain, SOB, sputum changes or wheezing  · Cardiovascular ROS: Per HPI. · Gastrointestinal ROS: negative for - abdominal pain, blood in stools, diarrhea, hematemesis, melena, nausea/vomiting or swallowing difficulty/pain  · Genito-Urinary ROS: negative for - dysuria or incontinence  · Musculoskeletal ROS: negative for - joint swelling or muscle pain  · Neurological ROS: negative for - confusion, dizziness, gait disturbance, headaches, numbness/tingling, seizures, speech problems, tremors, visual changes or weakness  · Dermatological ROS: negative for - rash    Physical Examination:  Vitals:    02/20/20 1112   BP: 132/80   Pulse: 60       · Constitutional: Oriented. No distress. · Head: Normocephalic and atraumatic. · Mouth/Throat: Oropharynx is clear and moist.   · Eyes: Conjunctivae normal. EOM are normal.   · Neck: Normal range of motion. Neck supple. No rigidity. No JVD present. · Cardiovascular: Normal rate, regular rhythm, S1&S2 and intact distal pulses. · Pulmonary/Chest: Bilateral respiratory sounds. No wheezes. No rhonchi. · Abdominal: Soft. Bowel sounds present. No distension, No tenderness. · Musculoskeletal: No tenderness. No edema    · Lymphadenopathy: Has no cervical adenopathy. · Neurological: Alert and oriented. Cranial nerve appears intact, No Gross deficit   · Skin: Skin is warm and dry. No rash noted. · Psychiatric: Has a normal mood, affect and behavior     Labs:  Reviewed. ECG: reviewed, AP, with QRS duration 82 ms. No pathologic Q waves, ventricular pre-excitation, or QT prolongation. Studies:   1. Event monitor:  none    2.  Echo 9/26/18:   Summary   Left ventricular cavity size is normal with mild concentric left ventricular   hypertrophy.   Left ventricular function is mildly reduced with ejection fraction estimated   at [facility]

## 2020-02-20 ENCOUNTER — OFFICE VISIT (OUTPATIENT)
Dept: CARDIOLOGY CLINIC | Age: 70
End: 2020-02-20
Payer: MEDICARE

## 2020-02-20 ENCOUNTER — NURSE ONLY (OUTPATIENT)
Dept: CARDIOLOGY CLINIC | Age: 70
End: 2020-02-20
Payer: MEDICARE

## 2020-02-20 VITALS
WEIGHT: 213.2 LBS | SYSTOLIC BLOOD PRESSURE: 132 MMHG | BODY MASS INDEX: 33.39 KG/M2 | DIASTOLIC BLOOD PRESSURE: 80 MMHG | HEART RATE: 60 BPM

## 2020-02-20 PROCEDURE — 99215 OFFICE O/P EST HI 40 MIN: CPT | Performed by: INTERNAL MEDICINE

## 2020-02-20 PROCEDURE — 93280 PM DEVICE PROGR EVAL DUAL: CPT | Performed by: INTERNAL MEDICINE

## 2020-02-20 PROCEDURE — 93000 ELECTROCARDIOGRAM COMPLETE: CPT | Performed by: INTERNAL MEDICINE

## 2020-02-21 ENCOUNTER — PREP FOR PROCEDURE (OUTPATIENT)
Dept: CARDIOLOGY CLINIC | Age: 70
End: 2020-02-21

## 2020-02-21 RX ORDER — CEFAZOLIN SODIUM 1 G/3ML
2 INJECTION, POWDER, FOR SOLUTION INTRAMUSCULAR; INTRAVENOUS ONCE
Status: CANCELLED | OUTPATIENT
Start: 2020-03-03

## 2020-02-21 RX ORDER — SODIUM CHLORIDE 0.9 % (FLUSH) 0.9 %
10 SYRINGE (ML) INJECTION PRN
Status: CANCELLED | OUTPATIENT
Start: 2020-02-21

## 2020-02-21 RX ORDER — SODIUM CHLORIDE 0.9 % (FLUSH) 0.9 %
10 SYRINGE (ML) INJECTION EVERY 12 HOURS SCHEDULED
Status: CANCELLED | OUTPATIENT
Start: 2020-02-21

## 2020-02-21 RX ORDER — SODIUM CHLORIDE 9 MG/ML
INJECTION, SOLUTION INTRAVENOUS CONTINUOUS
Status: CANCELLED | OUTPATIENT
Start: 2020-02-21

## 2020-02-26 ENCOUNTER — TELEPHONE (OUTPATIENT)
Dept: CARDIOLOGY CLINIC | Age: 70
End: 2020-02-26

## 2020-02-26 NOTE — TELEPHONE ENCOUNTER
Patient calling stating she's scheduled for a procedure on Tuesday 3/3 and needs instructions.  Please reach patient on [phone]

## 2020-02-28 DIAGNOSIS — I48.0 PAROXYSMAL ATRIAL FIBRILLATION (HCC): ICD-10-CM

## 2020-02-28 LAB
ANION GAP SERPL CALCULATED.3IONS-SCNC: 11 MMOL/L (ref 3–16)
BUN BLDV-MCNC: 15 MG/DL (ref 7–20)
CALCIUM SERPL-MCNC: 10.1 MG/DL (ref 8.3–10.6)
CHLORIDE BLD-SCNC: 104 MMOL/L (ref 99–110)
CO2: 26 MMOL/L (ref 21–32)
CREAT SERPL-MCNC: 0.6 MG/DL (ref 0.6–1.2)
GFR AFRICAN AMERICAN: >60
GFR NON-AFRICAN AMERICAN: >60
GLUCOSE BLD-MCNC: 102 MG/DL (ref 70–99)
HCT VFR BLD CALC: 36.6 % (ref 36–48)
HEMOGLOBIN: 11.8 G/DL (ref 12–16)
INR BLD: 1.12 (ref 0.86–1.14)
MCH RBC QN AUTO: 28.1 PG (ref 26–34)
MCHC RBC AUTO-ENTMCNC: 32.2 G/DL (ref 31–36)
MCV RBC AUTO: 87.4 FL (ref 80–100)
PDW BLD-RTO: 14.5 % (ref 12.4–15.4)
PLATELET # BLD: 298 K/UL (ref 135–450)
PMV BLD AUTO: 8.8 FL (ref 5–10.5)
POTASSIUM SERPL-SCNC: 4.9 MMOL/L (ref 3.5–5.1)
PROTHROMBIN TIME: 13 SEC (ref 10–13.2)
RBC # BLD: 4.19 M/UL (ref 4–5.2)
SODIUM BLD-SCNC: 141 MMOL/L (ref 136–145)
WBC # BLD: 4.3 K/UL (ref 4–11)

## 2020-03-02 ENCOUNTER — TELEPHONE (OUTPATIENT)
Dept: CARDIOLOGY CLINIC | Age: 70
End: 2020-03-02

## 2020-03-02 RX ORDER — ISOSORBIDE DINITRATE AND HYDRALAZINE HYDROCHLORIDE 37.5; 2 MG/1; MG/1
TABLET ORAL
Qty: 90 TABLET | Refills: 5 | Status: SHIPPED | OUTPATIENT
Start: 2020-03-02 | End: 2020-03-02

## 2020-03-02 RX ORDER — ISOSORBIDE DINITRATE AND HYDRALAZINE HYDROCHLORIDE 37.5; 2 MG/1; MG/1
TABLET ORAL
Qty: 30 TABLET | Refills: 4 | Status: SHIPPED
Start: 2020-03-02 | End: 2020-09-01

## 2020-03-02 RX ORDER — HYDRALAZINE HYDROCHLORIDE AND ISOSORBIDE DINITRATE 37.5; 2 MG/1; MG/1
TABLET, FILM COATED ORAL
Qty: 30 TABLET | Refills: 4 | Status: SHIPPED | OUTPATIENT
Start: 2020-03-02 | End: 2020-03-06 | Stop reason: SDUPTHER

## 2020-03-02 NOTE — TELEPHONE ENCOUNTER
Patient called stating she's having a procedure tomorrow and was told to call to get instructions.  Please reach patient on [phone]

## 2020-03-03 ENCOUNTER — HOSPITAL ENCOUNTER (OUTPATIENT)
Dept: CARDIAC CATH/INVASIVE PROCEDURES | Age: 70
Setting detail: OBSERVATION
Discharge: HOME | End: 2020-03-04
Attending: INTERNAL MEDICINE | Admitting: INTERNAL MEDICINE
Payer: MEDICARE

## 2020-03-03 PROCEDURE — C1894 INTRO/SHEATH, NON-LASER: HCPCS

## 2020-03-03 PROCEDURE — C1898 LEAD, PMKR, OTHER THAN TRANS: HCPCS

## 2020-03-03 PROCEDURE — 33217 INSERT 2 ELECTRODE PM-DEFIB: CPT

## 2020-03-03 PROCEDURE — 99153 MOD SED SAME PHYS/QHP EA: CPT

## 2020-03-03 PROCEDURE — 99152 MOD SED SAME PHYS/QHP 5/>YRS: CPT

## 2020-03-03 PROCEDURE — 6370000000 HC RX 637 (ALT 250 FOR IP): Performed by: INTERNAL MEDICINE

## 2020-03-03 PROCEDURE — G0378 HOSPITAL OBSERVATION PER HR: HCPCS

## 2020-03-03 PROCEDURE — 33217 INSERT 2 ELECTRODE PM-DEFIB: CPT | Performed by: INTERNAL MEDICINE

## 2020-03-03 RX ORDER — SODIUM CHLORIDE 0.9 % (FLUSH) 0.9 %
10 SYRINGE (ML) INJECTION PRN
Status: DISCONTINUED | OUTPATIENT
Start: 2020-03-03 | End: 2020-03-04 | Stop reason: HOSPADM

## 2020-03-03 RX ORDER — HYDROCODONE BITARTRATE AND ACETAMINOPHEN 5; 325 MG/1; MG/1
2 TABLET ORAL EVERY 4 HOURS PRN
Status: DISCONTINUED | OUTPATIENT
Start: 2020-03-03 | End: 2020-03-04 | Stop reason: HOSPADM

## 2020-03-03 RX ORDER — CALCIUM CARBONATE 500(1250)
500 TABLET ORAL DAILY
Status: DISCONTINUED | OUTPATIENT
Start: 2020-03-04 | End: 2020-03-04 | Stop reason: HOSPADM

## 2020-03-03 RX ORDER — MIDAZOLAM HYDROCHLORIDE 1 MG/ML
1 INJECTION INTRAMUSCULAR; INTRAVENOUS ONCE
Status: DISCONTINUED | OUTPATIENT
Start: 2020-03-03 | End: 2020-03-03

## 2020-03-03 RX ORDER — ISOSORBIDE DINITRATE 10 MG/1
10 TABLET ORAL 2 TIMES DAILY
Status: DISCONTINUED | OUTPATIENT
Start: 2020-03-03 | End: 2020-03-04 | Stop reason: HOSPADM

## 2020-03-03 RX ORDER — HYDRALAZINE HYDROCHLORIDE 10 MG/1
20 TABLET, FILM COATED ORAL 2 TIMES DAILY
Status: DISCONTINUED | OUTPATIENT
Start: 2020-03-03 | End: 2020-03-04 | Stop reason: HOSPADM

## 2020-03-03 RX ORDER — ISOSORBIDE DINITRATE AND HYDRALAZINE HYDROCHLORIDE 37.5; 2 MG/1; MG/1
1 TABLET ORAL 3 TIMES DAILY
Status: DISCONTINUED | OUTPATIENT
Start: 2020-03-03 | End: 2020-03-03

## 2020-03-03 RX ORDER — SODIUM CHLORIDE 9 MG/ML
INJECTION, SOLUTION INTRAVENOUS CONTINUOUS
Status: DISCONTINUED | OUTPATIENT
Start: 2020-03-03 | End: 2020-03-03

## 2020-03-03 RX ORDER — HYDROCODONE BITARTRATE AND ACETAMINOPHEN 5; 325 MG/1; MG/1
1 TABLET ORAL EVERY 4 HOURS PRN
Status: DISCONTINUED | OUTPATIENT
Start: 2020-03-03 | End: 2020-03-04 | Stop reason: HOSPADM

## 2020-03-03 RX ORDER — SODIUM CHLORIDE 0.9 % (FLUSH) 0.9 %
10 SYRINGE (ML) INJECTION PRN
Status: DISCONTINUED | OUTPATIENT
Start: 2020-03-03 | End: 2020-03-03

## 2020-03-03 RX ORDER — ACETAMINOPHEN 325 MG/1
650 TABLET ORAL EVERY 4 HOURS PRN
Status: DISCONTINUED | OUTPATIENT
Start: 2020-03-03 | End: 2020-03-04 | Stop reason: HOSPADM

## 2020-03-03 RX ORDER — ONDANSETRON 2 MG/ML
4 INJECTION INTRAMUSCULAR; INTRAVENOUS ONCE
Status: DISCONTINUED | OUTPATIENT
Start: 2020-03-03 | End: 2020-03-03

## 2020-03-03 RX ORDER — CEFAZOLIN SODIUM 1 G/3ML
2 INJECTION, POWDER, FOR SOLUTION INTRAMUSCULAR; INTRAVENOUS ONCE
Status: DISCONTINUED | OUTPATIENT
Start: 2020-03-03 | End: 2020-03-03

## 2020-03-03 RX ORDER — CEFAZOLIN SODIUM 1 G/3ML
2 INJECTION, POWDER, FOR SOLUTION INTRAMUSCULAR; INTRAVENOUS ONCE
Status: DISCONTINUED | OUTPATIENT
Start: 2020-03-03 | End: 2020-03-03 | Stop reason: SDUPTHER

## 2020-03-03 RX ORDER — SODIUM CHLORIDE 0.9 % (FLUSH) 0.9 %
10 SYRINGE (ML) INJECTION EVERY 12 HOURS SCHEDULED
Status: DISCONTINUED | OUTPATIENT
Start: 2020-03-03 | End: 2020-03-03

## 2020-03-03 RX ORDER — DIPHENHYDRAMINE HYDROCHLORIDE 50 MG/ML
25 INJECTION INTRAMUSCULAR; INTRAVENOUS ONCE
Status: DISCONTINUED | OUTPATIENT
Start: 2020-03-03 | End: 2020-03-03

## 2020-03-03 RX ORDER — SODIUM CHLORIDE 0.9 % (FLUSH) 0.9 %
10 SYRINGE (ML) INJECTION EVERY 12 HOURS SCHEDULED
Status: DISCONTINUED | OUTPATIENT
Start: 2020-03-03 | End: 2020-03-04 | Stop reason: HOSPADM

## 2020-03-03 RX ADMIN — HYDROCODONE BITARTRATE AND ACETAMINOPHEN 2 TABLET: 5; 325 TABLET ORAL at 21:36

## 2020-03-03 ASSESSMENT — PAIN DESCRIPTION - PROGRESSION: CLINICAL_PROGRESSION: GRADUALLY WORSENING

## 2020-03-03 ASSESSMENT — PAIN DESCRIPTION - LOCATION: LOCATION: CHEST

## 2020-03-03 ASSESSMENT — PAIN DESCRIPTION - DESCRIPTORS: DESCRIPTORS: ACHING

## 2020-03-03 ASSESSMENT — PAIN DESCRIPTION - ORIENTATION: ORIENTATION: LEFT

## 2020-03-03 ASSESSMENT — PAIN DESCRIPTION - ONSET: ONSET: GRADUAL

## 2020-03-03 ASSESSMENT — PAIN DESCRIPTION - FREQUENCY: FREQUENCY: CONTINUOUS

## 2020-03-03 ASSESSMENT — PAIN SCALES - GENERAL: PAINLEVEL_OUTOF10: 10

## 2020-03-03 ASSESSMENT — PAIN - FUNCTIONAL ASSESSMENT: PAIN_FUNCTIONAL_ASSESSMENT: PREVENTS OR INTERFERES SOME ACTIVE ACTIVITIES AND ADLS

## 2020-03-03 ASSESSMENT — PAIN DESCRIPTION - PAIN TYPE: TYPE: SURGICAL PAIN

## 2020-03-03 NOTE — H&P
Patient's History and Physical from February 20, 2019 by me  was reviewed. Patient examined. There has been no change.       Sparkle Blake MD   Cardiac Electrophysiology  [address] [place] [address] [phone]  [facility]

## 2020-03-03 NOTE — PROCEDURES
[address]     Electrophysiology Procedure Note       Date of Procedure: 3/3/2020  Patient's Name: Mirela Elias  YOB: 1950   Medical Record Number: [id number]  Procedure Performed by: Mana Tovar MD    Procedures performed:  · Selective venography of left upper extremity. · Insertion of MRI compatible right ventricular pacing lead under fluoroscopy. · Insertion of MRI compatible right atrial lead under fluoroscopy  · Electronic analysis of lead and device. · Anesthesia: Local and Monitored Anesthesia Care  · Level of sedation plan: Moderate sedation (conscious sedation) with intravenous Midazolam 16 mg and Fentanyl 500 Mcg   · Start time: 1325  · Stop time: 1549  · Mallampati airway assessment class: I  · ASA class: 1  · (there were no independent trained observers who had no other duties involved in this procedure)  · Estimated blood loss less than 30 cc    Indication of the procedure:       Mirela Elias is a 69 y.o. female with symptomatic bradycardia, status post dual-chamber pacemaker implantation. Recently, her device interrogation noted drop in the lead impedance of both atrial and ventricular rate. She was also noted to have nonphysiological make and break signals in both atrial and ventricular leads, which was non-producible. She have [address] [facility] [address] tendril pacemaker lead which is known to have lead insulation defect. Hence, patient is recommended to have atrial and ventricular lead revision. Details of procedure: The patient was brought to the electrophysiology laboratory in stable condition. The patient was in a fasting and non-sedated state. The risks, benefits and alternatives of the procedure were discussed with the patient.  The risks including, but not limited to, the risks of vascular injury, bleeding, infection, device malfunction, lead dislodgement, radiation exposure, injury to cardiac and surrounding structures (including pneumothorax), stroke, myocardial infarction and death were discussed in detail. The patient opted to proceed with the device implantation. Written informed consent was signed and placed in the chart. Prophylactic antibiotic using Ancef 2 g IV was given. The patient was prepped and draped in sterile fashion. A timeout protocol was completed to identify the patient and the procedure being performed. IV sedation was provided with IV Versed and IV Fentanyl. The patient was monitored continuously with ECG, pulse oximetry, blood pressure monitoring, and direct observation. An incision was made in the left upper pectoral area over the previously placed incision after administration of lidocaine/bupivicaine combination. Using plasma blade, we were able to reach the pocket and achieve hemostasis. Central venous access into the left axillary vein was obtained using the modified Seldinger technique. After central venous access was obtained, a sheath was placed in the axillary vein. A right ventricular lead was advanced into the RV septal position under fluoroscopic guidance and using a series of curved and straight stylets. The lead was actively fixated. After confirming appropriate function and no diaphragmatic stimulation at maximum output, the sheath was split and removed. The lead was secured to the underlying tissue using suture material.      A new sheath was advanced over a second previously placed wire into the vein. The atrial lead was advanced to the right atrial appendage and actively fixated under fluoroscopic guidance. After confirming appropriate function and no diaphragmatic stimulation at maximum output, the sheath was split and removed. The lead was secured to the underlying tissue using suture. The leads were then connected to the new pulse generator which was then placed into the pocket. Antibiotic solution along with saline flush was used to irrigate the pocket.      The pocket was then closed using a 2-0, 3-0 Vicryl subcutaneous layer and a subcuticular layer using 4-0 Vicryl. The skin was covered with Steri-Strips and pressure dressing. All sponge and needle counts were reported as correct at the end of the procedure. The patient tolerated the procedure well and there were no complications. Patient was transported to the holding area in stable condition. Device and Leads information:          Device was programmed to MVP with lower rate of 60 and upper tracking rate of 130. Impression:    Pre- and post-procedural diagnoses of atrial and ventricular lead malfunction with successful atrial ventricular lead revision, with a pre-existing [facility] pacemaker generator. Plan:     The patient will be admitted and have usual post-implant care, including chest x-ray, intravenous antibiotic therapy, and interrogation of the device. From an EP perspective, if the interrogation and CXR tomorrow AM are showing appropriate functioning, parameters and placement, the patient may be discharged from the hospital. Follow-up will be a 1-week wound check with our nurse in the [facility] and a 1-month follow-up with Ms. Amina Tariq. Thank you for allowing us to participate in the care of your patient. If you have any questions, please do not hesitate to contact me.     Rick Hensley MD   Cardiac Electrophysiology  [facility]

## 2020-03-03 NOTE — PRE SEDATION
20-37.5 MG per tablet TAKE ONE-HALF TABLET BY MOUTH TWICE A DAY 3/2/20   Anali Simon APRN - CNP     Coumadin Use Last 7 Days:  no  Antiplatelet drug therapy use last 7 days: no  Other anticoagulant use last 7 days: yes -Eliquis  Additional Medication Information: None      Pre-Sedation Documentation and Exam:   I have personally completed a history, physical exam & review of systems for this patient (see notes). Vital signs have been reviewed (see flow sheet for vitals).     Mallampati Airway Assessment:  Mallampati Class I - (soft palate, fauces, uvula & anterior/posterior tonsillar pillars are visible)    Prior History of Anesthesia Complications:   none    ASA Classification:  Class 2 - A normal healthy patient with mild systemic disease    Sedation/ Anesthesia Plan:   intravenous sedation    Medications Planned:   midazolam (Versed) intravenously and fentanyl intravenously    Patient is an appropriate candidate for plan of sedation: yes    Electronically signed by Maryjo Dahl MD on 3/3/2020 at 4:12 PM

## 2020-03-04 ENCOUNTER — APPOINTMENT (OUTPATIENT)
Dept: GENERAL RADIOLOGY | Age: 70
End: 2020-03-04
Attending: INTERNAL MEDICINE
Payer: MEDICARE

## 2020-03-04 VITALS
DIASTOLIC BLOOD PRESSURE: 68 MMHG | OXYGEN SATURATION: 99 % | HEIGHT: 67 IN | SYSTOLIC BLOOD PRESSURE: 132 MMHG | HEART RATE: 60 BPM | RESPIRATION RATE: 16 BRPM | TEMPERATURE: 97.9 F | WEIGHT: 212 LBS | BODY MASS INDEX: 33.27 KG/M2

## 2020-03-04 PROCEDURE — 6370000000 HC RX 637 (ALT 250 FOR IP): Performed by: INTERNAL MEDICINE

## 2020-03-04 PROCEDURE — 6360000002 HC RX W HCPCS: Performed by: INTERNAL MEDICINE

## 2020-03-04 PROCEDURE — 2580000003 HC RX 258: Performed by: INTERNAL MEDICINE

## 2020-03-04 PROCEDURE — G0378 HOSPITAL OBSERVATION PER HR: HCPCS

## 2020-03-04 PROCEDURE — 71045 X-RAY EXAM CHEST 1 VIEW: CPT

## 2020-03-04 PROCEDURE — 99217 PR OBSERVATION CARE DISCHARGE MANAGEMENT: CPT | Performed by: NURSE PRACTITIONER

## 2020-03-04 RX ADMIN — Medication 10 ML: at 09:36

## 2020-03-04 RX ADMIN — CEFAZOLIN 2 G: 10 INJECTION, POWDER, FOR SOLUTION INTRAVENOUS; PARENTERAL at 09:35

## 2020-03-04 RX ADMIN — HYDROCODONE BITARTRATE AND ACETAMINOPHEN 2 TABLET: 5; 325 TABLET ORAL at 06:02

## 2020-03-04 RX ADMIN — HYDRALAZINE HYDROCHLORIDE 20 MG: 10 TABLET, FILM COATED ORAL at 01:23

## 2020-03-04 RX ADMIN — CEFAZOLIN 2 G: 10 INJECTION, POWDER, FOR SOLUTION INTRAVENOUS; PARENTERAL at 01:24

## 2020-03-04 RX ADMIN — Medication 10 ML: at 01:32

## 2020-03-04 RX ADMIN — CALCIUM 500 MG: 500 TABLET ORAL at 09:36

## 2020-03-04 RX ADMIN — HYDROCODONE BITARTRATE AND ACETAMINOPHEN 2 TABLET: 5; 325 TABLET ORAL at 01:43

## 2020-03-04 RX ADMIN — HYDRALAZINE HYDROCHLORIDE 20 MG: 10 TABLET, FILM COATED ORAL at 09:36

## 2020-03-04 RX ADMIN — ISOSORBIDE DINITRATE 10 MG: 10 TABLET ORAL at 09:36

## 2020-03-04 RX ADMIN — ISOSORBIDE DINITRATE 10 MG: 10 TABLET ORAL at 01:23

## 2020-03-04 ASSESSMENT — PAIN DESCRIPTION - ONSET
ONSET: ON-GOING
ONSET: GRADUAL
ONSET: ON-GOING
ONSET: ON-GOING

## 2020-03-04 ASSESSMENT — PAIN DESCRIPTION - ORIENTATION
ORIENTATION: LEFT

## 2020-03-04 ASSESSMENT — PAIN DESCRIPTION - DESCRIPTORS
DESCRIPTORS: ACHING

## 2020-03-04 ASSESSMENT — PAIN DESCRIPTION - PAIN TYPE
TYPE: SURGICAL PAIN

## 2020-03-04 ASSESSMENT — PAIN DESCRIPTION - PROGRESSION
CLINICAL_PROGRESSION: NOT CHANGED
CLINICAL_PROGRESSION: NOT CHANGED
CLINICAL_PROGRESSION: GRADUALLY WORSENING
CLINICAL_PROGRESSION: GRADUALLY WORSENING

## 2020-03-04 ASSESSMENT — PAIN DESCRIPTION - LOCATION
LOCATION: CHEST

## 2020-03-04 ASSESSMENT — PAIN DESCRIPTION - FREQUENCY
FREQUENCY: CONTINUOUS

## 2020-03-04 ASSESSMENT — PAIN - FUNCTIONAL ASSESSMENT
PAIN_FUNCTIONAL_ASSESSMENT: PREVENTS OR INTERFERES SOME ACTIVE ACTIVITIES AND ADLS

## 2020-03-04 ASSESSMENT — PAIN SCALES - GENERAL
PAINLEVEL_OUTOF10: 7
PAINLEVEL_OUTOF10: 8
PAINLEVEL_OUTOF10: 3
PAINLEVEL_OUTOF10: 8

## 2020-03-04 NOTE — PLAN OF CARE
Problem: Falls - Risk of:  Goal: Will remain free from falls  Description  Will remain free from falls  Outcome: Ongoing  Pt scores as a medium fall risk and is on bedrest at this time. Bed alarm is on and in lowest position. Pt is using call light appropriately. Will continue to monitor. Problem: Pain:  Goal: Control of acute pain  Description  Control of acute pain  Outcome: Ongoing   Pt has 8/10 L chest pain at surgical site. Pain medication given per MD order. Reassessing after each pain medication dose. Problem: Cardiac:  Goal: Ability to maintain vital signs within normal range will improve  Description  Ability to maintain vital signs within normal range will improve  Outcome: Ongoing   Pt's VSS. Bp elevated. Will continue to monitor.    Vitals:    03/04/20 0118   BP: (!) 143/76   Pulse: 61   Resp: 16   Temp: 98.2 °F (36.8 °C)   SpO2: 98%

## 2020-03-04 NOTE — DISCHARGE INSTR - COC
Continuity of Care Form    Patient Name: Clem White   :  1950  MRN:  [id number]    Admit date:  3/3/2020  Discharge date:  ***    Code Status Order: Full Code   Advance Directives:   Advance Care Flowsheet Documentation     Date/Time Healthcare Directive Type of Healthcare Directive Copy in [address] Agent's Name Healthcare Agent's Phone Number    [phone]  No, patient does not have an advance directive for healthcare treatment -- -- -- -- --          Admitting Physician:  Beryle Moos, MD  PCP: Steve Mcghee    Discharging Nurse: [facility] Unit/Room#: 7638/0138-77  Discharging Unit Phone Number: ***    Emergency Contact:   Extended Emergency Contact Information  Primary Emergency Contact: Nate Hernandez  Address: [address] [place] [address] Phone: [phone]  Mobile Phone: [phone]  Relation: Spouse  Secondary Emergency Contact: Nate Hernandez  Address: [address] [place] [address] Phone: [phone]  Mobile Phone: [phone]  Relation: Spouse    Past Surgical History:  Past Surgical History:   Procedure Laterality Date    CARDIAC PACEMAKER PLACEMENT      PACEMAKER PLACEMENT         Immunization History: There is no immunization history on file for this patient.     Active Problems:  Patient Active Problem List   Diagnosis Code    SSS (sick sinus syndrome) I49.5    HTN (hypertension) I10    Pacemaker [facility] Z95.0       Isolation/Infection:   Isolation          No Isolation        Patient Infection Status     None to display          Nurse Assessment:  Last Vital Signs: BP (!) 141/75   Pulse 63   Temp 98.2 °F (36.8 °C) (Oral)   Resp 16   Ht 5' 7\" (1.702 m)   Wt 212 lb (96.2 kg)   SpO2 98%   BMI 33.20 kg/m²     Last documented pain score (0-10 scale): Pain Level: 7  Last Weight:   Wt Readings from Last 1 Belongings:098204310}    RN SIGNATURE:  {Esignature:283384245}    CASE MANAGEMENT/SOCIAL WORK SECTION    Inpatient Status Date: ***    Readmission Risk Assessment Score:  Readmission Risk              Risk of Unplanned Readmission:        7           Discharging to Facility/ Agency   · Name:   · Address:  · Phone:  · Fax:    Dialysis Facility (if applicable)   · Name:  · Address:  · Dialysis Schedule:  · Phone:  · Fax:    / signature: {Esignature:162211135}    PHYSICIAN SECTION    Prognosis: {Prognosis:2395427163}    Condition at Discharge: [address] [place] [address] Patient Condition:557568712}    Rehab Potential (if transferring to Rehab): {Prognosis:3801981112}    Recommended Labs or Other Treatments After Discharge: ***    Physician Certification: I certify the above information and transfer of Joseph Becerra  is necessary for the continuing treatment of the diagnosis listed and that she requires {Admit to Appropriate Level of Care:61442} for {GREATER/LESS:625079440} 30 days.      Update Admission H&P: {CHP DME Changes in INSPR:759667437}    PHYSICIAN SIGNATURE:  {Esignature:636207788}

## 2020-03-04 NOTE — PLAN OF CARE
Problem: Falls - Risk of:  Goal: Will remain free from falls  Description  Will remain free from falls  3/4/2020 1557 by Zakiya Lai RN  Outcome: Completed     Problem: Falls - Risk of:  Goal: Absence of physical injury  Description  Absence of physical injury  Outcome: Completed     Problem: Pain:  Goal: Pain level will decrease  Description  Pain level will decrease  Outcome: Completed     Problem: Pain:  Goal: Control of acute pain  Description  Control of acute pain  3/4/2020 1557 by Zakiya Lai RN  Outcome: Completed     Problem: Pain:  Goal: Control of chronic pain  Description  Control of chronic pain  Outcome: Completed     Problem: Cardiac:  Goal: Ability to maintain vital signs within normal range will improve  Description  Ability to maintain vital signs within normal range will improve  3/4/2020 1557 by Zakiya Lai RN  Outcome: Completed     Problem: Cardiac:  Goal: Cardiovascular alteration will improve  Description  Cardiovascular alteration will improve  Outcome: Completed     Problem: Cardiac:  Goal: Ability to maintain vital signs within normal range will improve  Description  Ability to maintain vital signs within normal range will improve  3/4/2020 1557 by Zakiya Lai RN  Outcome: Completed

## 2020-03-04 NOTE — DISCHARGE SUMMARY
EP Discharge Summary  [facility] MD Eunice Jean Baptiste MD  Meghan Hernadezont CNP  3/4/20    Patient :Nidhi Messina  Room/Bed: 5246/0186-53  Medical Record: [id number]  YOB: 1950    Admit date: 3/3/2020  Discharge date and time: 03/04/20     Admitting Physician: Brian Mosqueda MD   Discharge Physician: Brian Mosqueda MD    Admission Diagnoses: Paroxysmal atrial fibrillation [I48.0]  SSS (sick sinus syndrome) ([facility] [address] [I49.5]  Discharge Diagnoses: pAF, SSS, RA/RV lead failure (insulation break), s/p RA/RV lead revision    Discharged Condition: stable    Hospital Course: 69 y.o. [place] woman with h/o HTN, HLD, SSS, SHERRI on CPAP, s/p dual chamber SJM PPM (Dr. Joanne Wadsworth, 7/31/14), recent dx of AF on PPM, had been in [facility]. [place] visiting her sister, developed CP, went to ED, + MPI, s/p LHC (6/2019) showed norm cors. Found to have 45 min of AF on PPM device check, placed on Eliquis, in f/u found to have noise on RA/RV lead, device interrogation consistent with insulation break on both leads (common finding of the tendril lead). Patient admitted for lead revision of rhe RA and RV leads. Underwent procedure without complications. Monitored overnight. Pain relieved with prn pain meds. CXR shows stable lead placement. Post op device check shows stable parameters. L chest incision CDI. Reviewed wound care and activity restrictions with patient. To home in stable condition.      Consults: none    Significant Diagnostic Studies: radiology: CXR: normal and stable lead placement    Discharge Medications:   Current Discharge Medication List      CONTINUE these medications which have NOT CHANGED    Details   apixaban (ELIQUIS) 5 MG TABS tablet Take 1 tablet by mouth 2 times daily 2 packs of 14 tablets provided to patient  Qty: 60 tablet, Refills: 3      !! isosorbide-hydrALAZINE (BIDIL) 20-37.5 MG per tablet TAKE ONE-HALF TABLET BY MOUTH TWICE A DAY  Qty: 30 tablet, Refills:

## 2020-03-06 RX ORDER — ISOSORBIDE DINITRATE AND HYDRALAZINE HYDROCHLORIDE 37.5; 2 MG/1; MG/1
1 TABLET ORAL DAILY
Qty: 30 TABLET | Refills: 3 | Status: SHIPPED | OUTPATIENT
Start: 2020-03-06 | End: 2020-03-09 | Stop reason: SDUPTHER

## 2020-03-09 ENCOUNTER — TELEPHONE (OUTPATIENT)
Dept: CARDIOLOGY CLINIC | Age: 70
End: 2020-03-09

## 2020-03-09 RX ORDER — CLOTRIMAZOLE 1 %
CREAM (GRAM) TOPICAL
Qty: 1 TUBE | Refills: 1 | Status: SHIPPED | OUTPATIENT
Start: 2020-03-09 | End: 2020-03-16

## 2020-03-09 RX ORDER — HYDRALAZINE HYDROCHLORIDE AND ISOSORBIDE DINITRATE 37.5; 2 MG/1; MG/1
1 TABLET, FILM COATED ORAL DAILY
Qty: 90 TABLET | Refills: 2 | Status: SHIPPED
Start: 2020-03-09 | End: 2020-04-14 | Stop reason: SDUPTHER

## 2020-03-12 ENCOUNTER — NURSE ONLY (OUTPATIENT)
Dept: CARDIOLOGY CLINIC | Age: 70
End: 2020-03-12
Payer: MEDICARE

## 2020-03-12 PROCEDURE — 93280 PM DEVICE PROGR EVAL DUAL: CPT | Performed by: INTERNAL MEDICINE

## 2020-03-13 NOTE — PROGRESS NOTES
Post  Op check for A/V lead revision. RN to check incision. Device interrogation by company representative. See interrogation for further details. Device functioing as programmed. 1  AMS recording shows brief AT. Follow up 1 month in office. See PACEART report under Cardiology tab.

## 2020-03-23 ENCOUNTER — OFFICE VISIT (OUTPATIENT)
Dept: CARDIOLOGY CLINIC | Age: 70
End: 2020-03-23
Payer: MEDICARE

## 2020-03-23 VITALS
SYSTOLIC BLOOD PRESSURE: 120 MMHG | DIASTOLIC BLOOD PRESSURE: 70 MMHG | WEIGHT: 214.2 LBS | BODY MASS INDEX: 33.55 KG/M2 | HEART RATE: 63 BPM

## 2020-03-23 PROCEDURE — 99214 OFFICE O/P EST MOD 30 MIN: CPT | Performed by: INTERNAL MEDICINE

## 2020-03-23 NOTE — PROGRESS NOTES
Wt Readings from Last 3 Encounters:   03/23/20 214 lb 3.2 oz (97.2 kg)   03/03/20 212 lb (96.2 kg)   02/20/20 213 lb 3.2 oz (96.7 kg)     Constitutional: She is oriented to person, place, and time. She appears well-developed and well-nourished. In no acute distress. HEENT: Normocephalic and atraumatic. Sclerae anicteric. No xanthelasmas. Conjunctiva white, no subconjunctival hemorrhage   External inspection of ears nose teeth & gums   Eyes:PERRLA EOM's intact. Neck: Neck supple. No JVD present. Carotids without bruits. No mass and no thyromegaly present. No lymphadenopathy present. Cardiovascular: RRR, normal S1 and S2; no murmur/gallop or rub, PMI nondisplaced  Pulmonary/Chest: Effort normal.  Lungs clear to auscultation. Chest wall nontender  Abdominal: soft, nontender, nondistended. + bowel sounds; no organomegaly or bruits. Aorta normal  Extremities: No edema, cyanosis, or clubbing. Pulses are 2+ radial/carotid/dorsalis pedis bilaterally. Cap refill brisk. Neurological: No cranial nerve deficit. Psychiatric: She has a normal mood and affect. Her speech is normal and behavior is normal.     Lab Review:   No results found for: TRIG, HDL, LDLCALC, LDLDIRECT, LABVLDL  Lab Results   Component Value Date     02/28/2020    K 4.9 02/28/2020     02/28/2020    CO2 26 02/28/2020    BUN 15 02/28/2020    CREATININE 0.6 02/28/2020    GLUCOSE 102 02/28/2020    CALCIUM 10.1 02/28/2020      Lab Results   Component Value Date    WBC 4.3 02/28/2020    HGB 11.8 (L) 02/28/2020    HCT 36.6 02/28/2020    MCV 87.4 02/28/2020     02/28/2020       Assessment:    Pacemaker leads seems to potentially have some decreased impedance. I think they are still working adequately. And she is not dependent on the pacemaker at this time. We will continue to monitor her. She has about 4 and half years of battery life. Regarding the burning in her left calf I think it may be some type of neuropathy of sorts.   She

## 2020-04-02 PROBLEM — I48.0 PAF (PAROXYSMAL ATRIAL FIBRILLATION) (HCC): Status: ACTIVE | Noted: 2020-04-02

## 2020-04-09 NOTE — PROGRESS NOTES
216 lb 6.4 oz (98.2 kg)   03/23/20 214 lb 3.2 oz (97.2 kg)   03/03/20 212 lb (96.2 kg)       · Constitutional: Oriented. No distress. · Head: Normocephalic and atraumatic. · Mouth/Throat: Oropharynx is clear and moist.   · Eyes: Conjunctivae clear without jaunduice. PERRL. · Neck: Neck supple. No rigidity. No JVD present. · Cardiovascular: Normal rate, regular rhythm, S1&S2. · Pulmonary/Chest: Bilateral respiratory sounds. No wheezes, No rhonchi. · Abdominal: Soft. Bowel sounds present. No distension, No tenderness. · Musculoskeletal: No tenderness. No edema    · Lymphadenopathy: Has no cervical adenopathy. · Neurological: Alert and oriented. Cranial nerve appears intact, No Gross deficit   · Skin: Skin is warm and dry. No rash noted. · Psychiatric: Has a normal mood, affect and behavior     Labs:  Reviewed. No results for input(s): NA, K, CL, CO2, PHOS, BUN, CREATININE in the last 72 hours. Invalid input(s): CA,  TSH  No results for input(s): WBC, HGB, HCT, MCV, PLT in the last 72 hours. No results found for: CKTOTAL, CKMB, CKMBINDEX, TROPONINI  No results found for: BNP  Lab Results   Component Value Date    PROTIME 13.0 02/28/2020    INR 1.12 02/28/2020     No results found for: CHOL, HDL, TRIG    ECG: Personally reviewed: A paced, V sensed, HR 76, , QRS 80, QTc 390    ECHO:  9/26/2018  Summary   Left ventricular cavity size is normal with mild concentric left ventricular   hypertrophy. Left ventricular function is mildly reduced with ejection fraction estimated   at 45-50%. Lateral wall and posterolateral wall appears hypokinetic. Diastolic filling parameters suggest grade I diastolic dysfunction. Aortic valve appears slightly thickened but opens adequately. Mild tricuspid regurgitation. Estimated pulmonary artery systolic pressure is at 37 mmHg assuming a right   atrial pressure of 3 mmHg.     Stress Test: 9/26/2018  No reversible ischemia    Cardiac Angiography: n/a    Problem List:   Patient Active Problem List    Diagnosis Date Noted    Pacemaker st Yareli Robbins 07/31/2014     Priority: High    SSS (sick sinus syndrome) 06/21/2012     Priority: High    HTN (hypertension) 06/21/2012     Priority: High    PAF (paroxysmal atrial fibrillation) ([facility] 04/02/2020        Assessment:   1. SSS (sick sinus syndrome) ([facility]    2. Sinus bradycardia    3. Pacemaker    4. Paroxysmal atrial fibrillation (HCC)    5. Dilated cardiomyopathy ([facility]      Cardiac HX: Lore Gee is a 78 y.o. woman with h/o HTN, HLD, SSS, s/p dual chamber SJM PPM (Dr. Greta Coyle, 7/31/14), recent dx of AF on PPM, had been in [facility]. [place], [place] visiting her sister, developed CP, went to ED, + MPI, s/p LHC (6/2019) showed nor cors. In f/u noted to have 45 min of AF on PPM device check. Placed on Eliquis. [id number] 4. TSH 1.17 (1/17/20). SSS  - S/p dual chamber SJM PPM  - S/p RA/RV lead revision to MDT leads  -Device check from 4/7/2020 shows 89% AP, 1% , 2 mode switch episodes 1 for a functional non-capture, the other for a 15-hour episode of atrial fibrillation which is appropriate, other parameters stable. -F/u with Dr. Lee More in 2 months    pAF  - In NSR  - 1 episode AF on device lasting 15 hours  - On apixban 5 mg BID - no s/s bleeding - continue  - ECG ordered and results personally reviewed     CMP  - EF 45-50%  - On Bidil 20-37.5 - offered to break down and order separately d/t cost however patient wants to think about it  - Will add Toprol XL 25 mg QD  - Repeat echo when pandemic resolves    EF of 11-29%  ACEi for systolic HF  ASA and Statin for CAD  Anticoagulation for AF and heart failure  No Tobacco use. All questions and concerns were addressed to the patient/family. Alternatives to my treatment were discussed. The note was completed using EMR. Every effort was made to ensure accuracy; however, inadvertent computerized transcription errors may be present.      Patient received education regarding their diagnosis, treatment and medications while in the office today.       Harry Lemon [address] [facility]

## 2020-04-14 ENCOUNTER — OFFICE VISIT (OUTPATIENT)
Dept: CARDIOLOGY CLINIC | Age: 70
End: 2020-04-14
Payer: MEDICARE

## 2020-04-14 VITALS
HEART RATE: 76 BPM | WEIGHT: 216.4 LBS | DIASTOLIC BLOOD PRESSURE: 76 MMHG | SYSTOLIC BLOOD PRESSURE: 130 MMHG | BODY MASS INDEX: 33.89 KG/M2

## 2020-04-14 PROCEDURE — 99214 OFFICE O/P EST MOD 30 MIN: CPT | Performed by: NURSE PRACTITIONER

## 2020-04-14 PROCEDURE — 93000 ELECTROCARDIOGRAM COMPLETE: CPT | Performed by: NURSE PRACTITIONER

## 2020-04-14 RX ORDER — METOPROLOL SUCCINATE 25 MG/1
25 TABLET, EXTENDED RELEASE ORAL DAILY
Qty: 30 TABLET | Refills: 3 | Status: SHIPPED | OUTPATIENT
Start: 2020-04-14 | End: 2020-11-16 | Stop reason: SDUPTHER

## 2020-05-28 ENCOUNTER — OFFICE VISIT (OUTPATIENT)
Dept: CARDIOLOGY CLINIC | Age: 70
End: 2020-05-28
Payer: MEDICARE

## 2020-05-28 ENCOUNTER — NURSE ONLY (OUTPATIENT)
Dept: CARDIOLOGY CLINIC | Age: 70
End: 2020-05-28
Payer: MEDICARE

## 2020-05-28 VITALS
TEMPERATURE: 98.2 F | DIASTOLIC BLOOD PRESSURE: 80 MMHG | HEART RATE: 60 BPM | BODY MASS INDEX: 35.35 KG/M2 | SYSTOLIC BLOOD PRESSURE: 132 MMHG | WEIGHT: 225.2 LBS | HEIGHT: 67 IN

## 2020-05-28 PROCEDURE — 93000 ELECTROCARDIOGRAM COMPLETE: CPT | Performed by: INTERNAL MEDICINE

## 2020-05-28 PROCEDURE — 99024 POSTOP FOLLOW-UP VISIT: CPT | Performed by: INTERNAL MEDICINE

## 2020-05-28 PROCEDURE — 93280 PM DEVICE PROGR EVAL DUAL: CPT | Performed by: INTERNAL MEDICINE

## 2020-05-28 RX ORDER — FLECAINIDE ACETATE 50 MG/1
50 TABLET ORAL 2 TIMES DAILY
Qty: 60 TABLET | Refills: 3 | Status: SHIPPED | OUTPATIENT
Start: 2020-05-28 | End: 2020-08-27 | Stop reason: SDUPTHER

## 2020-05-28 NOTE — PROGRESS NOTES
clots, bruising or jaundice  · Endocrine ROS: negative for - skin changes, temperature intolerance or unexpected weight changes  · Respiratory ROS: negative for - cough, hemoptysis, pleuritic pain, SOB, sputum changes or wheezing  · Cardiovascular ROS: Per HPI. · Gastrointestinal ROS: negative for - abdominal pain, blood in stools, diarrhea, hematemesis, melena, nausea/vomiting or swallowing difficulty/pain  · Genito-Urinary ROS: negative for - dysuria or incontinence  · Musculoskeletal ROS: negative for - joint swelling or muscle pain  · Neurological ROS: negative for - confusion, dizziness, gait disturbance, headaches, numbness/tingling, seizures, speech problems, tremors, visual changes or weakness  · Dermatological ROS: negative for - rash    Physical Examination:  There were no vitals filed for this visit. · Constitutional: Oriented. No distress. · Head: Normocephalic and atraumatic. · Mouth/Throat: Oropharynx is clear and moist.   · Eyes: Conjunctivae normal. EOM are normal.   · Neck: Normal range of motion. Neck supple. No rigidity. No JVD present. · Cardiovascular: Normal rate, regular rhythm, S1&S2 and intact distal pulses. · Pulmonary/Chest: Bilateral respiratory sounds. No wheezes. No rhonchi. · Abdominal: Soft. Bowel sounds present. No distension, No tenderness. · Musculoskeletal: No tenderness. No edema    · Lymphadenopathy: Has no cervical adenopathy. · Neurological: Alert and oriented. Cranial nerve appears intact, No Gross deficit   · Skin: Skin is warm and dry. No rash noted. · Psychiatric: Has a normal mood, affect and behavior     Labs:  Reviewed. ECG: reviewed, AP, with QRS duration 82 ms. No pathologic Q waves, ventricular pre-excitation, or QT prolongation. Studies:   1. Event monitor:  none    2.  Echo 9/26/18:   Summary   Left ventricular cavity size is normal with mild concentric left ventricular   hypertrophy.   Left ventricular function is mildly reduced with

## 2020-06-05 ENCOUNTER — NURSE ONLY (OUTPATIENT)
Dept: CARDIOLOGY CLINIC | Age: 70
End: 2020-06-05
Payer: MEDICARE

## 2020-06-05 ENCOUNTER — TELEPHONE (OUTPATIENT)
Dept: CARDIOLOGY CLINIC | Age: 70
End: 2020-06-05

## 2020-06-05 DIAGNOSIS — Z79.899 ENCOUNTER FOR MONITORING FLECAINIDE THERAPY: Primary | ICD-10-CM

## 2020-06-05 DIAGNOSIS — Z51.81 ENCOUNTER FOR MONITORING FLECAINIDE THERAPY: Primary | ICD-10-CM

## 2020-06-05 PROCEDURE — 93000 ELECTROCARDIOGRAM COMPLETE: CPT | Performed by: INTERNAL MEDICINE

## 2020-06-16 ENCOUNTER — TELEPHONE (OUTPATIENT)
Dept: CARDIOLOGY CLINIC | Age: 70
End: 2020-06-16

## 2020-06-26 ENCOUNTER — PROCEDURE VISIT (OUTPATIENT)
Dept: CARDIOLOGY CLINIC | Age: 70
End: 2020-06-26
Payer: MEDICARE

## 2020-06-26 PROCEDURE — 93288 INTERROG EVL PM/LDLS PM IP: CPT | Performed by: INTERNAL MEDICINE

## 2020-06-29 ENCOUNTER — TELEPHONE (OUTPATIENT)
Dept: CARDIOLOGY CLINIC | Age: 70
End: 2020-06-29

## 2020-08-19 ENCOUNTER — TELEPHONE (OUTPATIENT)
Dept: CARDIOLOGY CLINIC | Age: 70
End: 2020-08-19

## 2020-08-27 ENCOUNTER — NURSE ONLY (OUTPATIENT)
Dept: CARDIOLOGY CLINIC | Age: 70
End: 2020-08-27
Payer: MEDICARE

## 2020-08-27 PROCEDURE — 93296 REM INTERROG EVL PM/IDS: CPT | Performed by: INTERNAL MEDICINE

## 2020-08-27 PROCEDURE — 93294 REM INTERROG EVL PM/LDLS PM: CPT | Performed by: INTERNAL MEDICINE

## 2020-08-27 RX ORDER — FLECAINIDE ACETATE 50 MG/1
50 TABLET ORAL 2 TIMES DAILY
Qty: 60 TABLET | Refills: 3 | Status: SHIPPED | OUTPATIENT
Start: 2020-08-27 | End: 2020-09-28

## 2020-08-27 NOTE — PROGRESS NOTES
Merlin Remote transmission of pacemaker and/or ICD, or implanted heart monitor shows normal cardiac device function. No  arrhythmias recorded. Follow up in 3 months via [address] [place] [address].

## 2020-08-27 NOTE — LETTER
[address] [facility] [phone] [address] [facility] [phone]    Pacemaker/Defibrillator Clinic          08/27/20        [address] [facility]        Dear Doroteo Bob    This letter is to inform you that we received the transmission from your monitor at home that checks your implanted heart device. The next date your monitor will automatically transmit will be 2/23/2021. If your report needs attention we will notify you. Your device and monitor are wireless and most transmit cellularly, but please periodically check your monitor is still plugged in to the electrical outlet. If you still use the telephone land line to send please ensure the connection to the phone adela is secure. This will help to ensure successful automatic transmissions in the future. Also, the monitor needs to be close to you while sleeping at night. Please be aware that the remote device transmission sites are periodically monitored only during regular business hours during which simultaneous in-office device clinics are being run. If your transmission requires attention, we will contact you as soon as possible. Thank you.             A[address]

## 2020-08-27 NOTE — TELEPHONE ENCOUNTER
[facility] Medication Refills:     This medication needs a PA         flecainide (TAMBOCOR) 50 MG tablet  Take 1 tablet by mouth 2 times daily, Disp-60 tablet, R-3     Pharmacy:[facility] [address] [place] [address] [phone] [facility] [phone]        Last Office Visit: 05/28/20    Next Office Visit: 11/23/20    Last Refill: 05/28/20    Last Labs: 02/28/20

## 2020-09-01 ENCOUNTER — OFFICE VISIT (OUTPATIENT)
Dept: CARDIOLOGY CLINIC | Age: 70
End: 2020-09-01
Payer: MEDICARE

## 2020-09-01 VITALS
TEMPERATURE: 97.5 F | DIASTOLIC BLOOD PRESSURE: 88 MMHG | SYSTOLIC BLOOD PRESSURE: 118 MMHG | HEART RATE: 60 BPM | BODY MASS INDEX: 35.08 KG/M2 | WEIGHT: 224 LBS

## 2020-09-01 PROCEDURE — 99214 OFFICE O/P EST MOD 30 MIN: CPT | Performed by: INTERNAL MEDICINE

## 2020-09-01 RX ORDER — HYDRALAZINE HYDROCHLORIDE 10 MG/1
10 TABLET, FILM COATED ORAL 2 TIMES DAILY
Qty: 90 TABLET | Refills: 3 | Status: SHIPPED
Start: 2020-09-01 | End: 2020-12-02 | Stop reason: DRUGHIGH

## 2020-09-01 RX ORDER — ISOSORBIDE DINITRATE 10 MG/1
10 TABLET ORAL 2 TIMES DAILY
Qty: 90 TABLET | Refills: 3 | Status: SHIPPED | OUTPATIENT
Start: 2020-09-01 | End: 2021-03-08

## 2020-09-01 RX ORDER — ISOSORBIDE DINITRATE 10 MG/1
10 TABLET ORAL 2 TIMES DAILY
COMMUNITY
End: 2020-09-01 | Stop reason: SDUPTHER

## 2020-09-01 RX ORDER — HYDRALAZINE HYDROCHLORIDE 10 MG/1
10 TABLET, FILM COATED ORAL 2 TIMES DAILY
COMMUNITY
End: 2020-09-01 | Stop reason: SDUPTHER

## 2020-09-01 NOTE — PROGRESS NOTES
[facility]  Office Visit           Raffi Trujillo MD,  [facility]                      Cardiology           Rafael Fang  1950 September 1, 2020         HPI:  The patient is 69 y.o. female with is here is seen in the office today for follow-up of her cardiac issues. She has left chest discomfort. The pacing site looks very good to me and is healing very nicely. Some tenderness in the left anterior chest wall area and left upper rib areas. The abdomen is soft and extremities show full range of motion without clubbing or edema. She relates to me that she did have abdominal pain ectomy from her significant weight loss. The surgical wounds are evaluated and seem to be healed very nicely. From a cardiac perspective all looks stable. We did review her angiogram from October 2017 with clean coronary arteries. Examination today otherwise is unremarkable and there is no edema. [facility] normal  cors 10/2017   / neg stress test in 2018   Left heart cath June 28, 2019 normal coronaries  No SOB no wt gain no PND SHERRI   HTN SSS/ SJM dual chamber PPM    Chronic atrial fibrillation on Eliquis        Review of Systems:  Constitutional: Denies  fatigue, weakness, night sweats or fever. HEENT: Denies new visual changes, ringing in ears, nosebleeds,nasal congestion  Respiratory: Denies new or change in SOB, PND, orthopnea or cough. Cardiovascular: see HPI  GI: Denies N/V, diarrhea, constipation, abdominal pain, change in bowel habits, melena or hematochezia  : Denies urinary frequency, urgency, incontinence, hematuria or dysuria. Skin: Denies rash, hives, or cyanosis  Musculoskeletal: Denies joint or muscle aches/pain  Neurological: Denies syncope or TIA-like symptoms.   Psychiatric: Denies anxiety, insomnia or depression     Past Medical History:   Diagnosis Date    Chronic kidney disease     Hypertension      Past Surgical History:   Procedure Laterality Date    CARDIAC PACEMAKER PLACEMENT  PACEMAKER PLACEMENT       Family History   Problem Relation Age of Onset    Diabetes Mother     Heart Disease Mother     Heart Failure Sister      Social History     Tobacco Use    Smoking status: Never Smoker    Smokeless tobacco: Never Used   Substance Use Topics    Alcohol use: Never     Frequency: Never    Drug use: Never       Allergies   Allergen Reactions    Contrast [Iodides] Nausea And Vomiting    Morphine Nausea And Vomiting     Current Outpatient Medications   Medication Sig Dispense Refill    flecainide (TAMBOCOR) 50 MG tablet Take 1 tablet by mouth 2 times daily 60 tablet 3    apixaban (ELIQUIS) 5 MG TABS tablet Take 1 tablet by mouth 2 times daily 180 tablet 3    metoprolol succinate (TOPROL XL) 25 MG extended release tablet Take 1 tablet by mouth daily (Patient not taking: Reported on 5/28/2020) 30 tablet 3    isosorbide-hydrALAZINE (BIDIL) 20-37.5 MG per tablet TAKE ONE-HALF TABLET BY MOUTH TWICE A DAY 30 tablet 4    Coenzyme Q10 50 MG CAPS Take 50 mg by mouth      calcium carbonate (OSCAL) 500 MG TABS tablet Take 500 mg by mouth daily      vitamin B-12 (CYANOCOBALAMIN) 100 MCG tablet Take 50 mcg by mouth daily      Multiple Vitamins-Minerals (THERAPEUTIC MULTIVITAMIN-MINERALS) tablet Take 1 tablet by mouth daily       No current facility-administered medications for this visit. Physical Exam:   /88   Pulse 60   Temp 97.5 °F (36.4 °C)   Wt 224 lb (101.6 kg)   BMI 35.08 kg/m²   BP Readings from Last 3 Encounters:   09/01/20 118/88   05/28/20 132/80   04/14/20 130/76     Pulse Readings from Last 3 Encounters:   09/01/20 60   05/28/20 60   04/14/20 76     Wt Readings from Last 3 Encounters:   09/01/20 224 lb (101.6 kg)   05/28/20 225 lb 3.2 oz (102.2 kg)   04/14/20 216 lb 6.4 oz (98.2 kg)     Constitutional: She is oriented to person, place, and time. She appears well-developed and well-nourished. In no acute distress. HEENT: Normocephalic and atraumatic. Sclerae anicteric. No xanthelasmas. Conjunctiva white, no subconjunctival hemorrhage   External inspection of ears nose teeth & gums   Eyes:PERRLA EOM's intact. Neck: Neck supple. No JVD present. Carotids without bruits. No mass and no thyromegaly present. No lymphadenopathy present. Cardiovascular: RRR, normal S1 and S2; no murmur/gallop or rub, PMI nondisplaced  Pulmonary/Chest: Effort normal.  Lungs clear to auscultation. Chest wall nontender  Abdominal: soft, nontender, nondistended. + bowel sounds; no organomegaly or bruits. Aorta normal  Extremities: No edema, cyanosis, or clubbing. Pulses are 2+ radial/carotid/dorsalis pedis bilaterally. Cap refill brisk. Neurological: No cranial nerve deficit. Psychiatric: She has a normal mood and affect. Her speech is normal and behavior is normal.     Lab Review:   No results found for: TRIG, HDL, LDLCALC, LDLDIRECT, LABVLDL  Lab Results   Component Value Date     02/28/2020    K 4.9 02/28/2020     02/28/2020    CO2 26 02/28/2020    BUN 15 02/28/2020    CREATININE 0.6 02/28/2020    GLUCOSE 102 02/28/2020    CALCIUM 10.1 02/28/2020      Lab Results   Component Value Date    WBC 4.3 02/28/2020    HGB 11.8 (L) 02/28/2020    HCT 36.6 02/28/2020    MCV 87.4 02/28/2020     02/28/2020       Assessment:    Pacemaker leads seems to potentially have some decreased impedance. I think they are still working adequately. And she is not dependent on the pacemaker at this time. We will continue to monitor her. She has about 4 and half years of battery life. Regarding the burning in her left calf I think it may be some type of neuropathy of sorts. She does not have diabetes and her last labs done late April 2019 were unremarkable. [facility] normal  cors 10/2017   / neg stress test in 2018   Summary 9/26/18   Left ventricular cavity size is normal with mild concentric left ventricular   hypertrophy.    Left ventricular function is mildly reduced with ejection fraction estimated   at 45-50%. Lateral wall and posterolateral wall appears hypokinetic. Diastolic filling parameters suggest grade I diastolic dysfunction. Aortic valve appears slightly thickened but opens adequately. Mild tricuspid regurgitation. Estimated pulmonary artery systolic pressure is at 37 mmHg assuming a right   atrial pressure of 3 mmHg. HTN  optimal    SSS/ SJM dual chamber PPM    11/2018   SJM  Interrogation [facility] dual chamber ACCENT   assessed & reprogrammed   Battery life good  Noise noted on lead / assessed by rep   A paced/ 85%  V paced<1%   afib <1% / 22 sec afib but looks like noise     SHERRI   usues CPAP regularly       Plan:  I do suspect all of her chest discomfort is related to musculoskeletal pain. She did have a cardiomyopathy and she is on medication including BiDil which she is having difficulty affording. We had discussion about transitioning her to the individual medications which includes hydralazine and isosorbide and she is agreeable and wishes to do that. We will continue that and will try that and see how she really occurs when she returns in the next 8 weeks.   Yvrose Villavicencio MD, [address] [facility]

## 2020-09-21 ENCOUNTER — HOSPITAL ENCOUNTER (OUTPATIENT)
Dept: NON INVASIVE DIAGNOSTICS | Age: 70
Discharge: HOME | End: 2020-09-21
Payer: MEDICARE

## 2020-09-21 ENCOUNTER — TELEPHONE (OUTPATIENT)
Dept: CARDIOLOGY CLINIC | Age: 70
End: 2020-09-21

## 2020-09-21 LAB
LV EF: 48 %
LVEF MODALITY: NORMAL

## 2020-09-21 PROCEDURE — 93306 TTE W/DOPPLER COMPLETE: CPT

## 2020-09-21 NOTE — TELEPHONE ENCOUNTER
Please call patient at [phone] to advise     Patient is asking for cardiac clearance to have a colonoscopy with Dr. Edie Humphrey at [facility]. She also needs a covid test before procedure. Patient also asking for her disability claim form to be filled out and she will come in to .

## 2020-09-22 NOTE — TELEPHONE ENCOUNTER
When she calls back, please ask when her colonoscopy is scheduled. BARBARA is out this week and next. The GI office needs to order the COVID test before the colonoscopy. Need to clarify with her the need for disability. BARBARA did a lead revision on 3/3/20. She is also a pt of Dr. Nanda Ware.

## 2020-09-24 NOTE — TELEPHONE ENCOUNTER
LVM asking pt to call back. Need to assess disability situation. It will likely need to go to [facility] as BARBARA only follows her for her device. Cardiac clearance form for colonoscopy sent to [facility] as he most recently saw the pt on 9/1/20 and he will be back into the office on 9/28. BARBARA isn't back until 10/5.

## 2020-09-28 ENCOUNTER — TELEPHONE (OUTPATIENT)
Dept: CARDIOLOGY CLINIC | Age: 70
End: 2020-09-28

## 2020-09-28 NOTE — TELEPHONE ENCOUNTER
Patient needs to have a colonoscopy and needs to know if Dr. Bryanna Fletcher received form from Dr. Tiajuana Shone. Did not see scanned into chart. Dr. King Schooling number is [phone]. Please call her about this. Thanks.

## 2020-09-28 NOTE — TELEPHONE ENCOUNTER
Patient needs cardiac clearance CARDIAC CLEARANCE     What type of procedure are you having  colonscopy    Which physician is performing your procedure Dr. Suad Shay    When is your procedure scheduled for  Waiting on clearance    Where are you having this procedure  [facility]    Are you taking Blood Thinners  yes   If so what? Eliquis  5mg.  1 table 2x's a day  (Name/dose/frequesncy)     Does the surgeon want you to stop your blood thinner yes   If so for how long  Up to Dr. Robinson Rosenberg    Phone Number and Contact Name for Physicians office [phone]    Fax number to send information   [phone]

## 2020-09-28 NOTE — TELEPHONE ENCOUNTER
Pt needs disability form completed based on work that she missed after lead revision. Form completed and waiting for UL's return to sign. Pt would like to come  the paperwork from our office once it is completed.

## 2020-10-01 NOTE — TELEPHONE ENCOUNTER
The patient called stating that Dr. Odell Hudson office did not receive the cardiac clearance letter. The letter is scanned into the patient chart, but does not state if the patient can hold blood thinner (Eliquis) and for how long. Please resubmit letter.  NRT:[phone]

## 2020-10-08 ENCOUNTER — TELEPHONE (OUTPATIENT)
Dept: CARDIOLOGY CLINIC | Age: 70
End: 2020-10-08

## 2020-10-08 NOTE — TELEPHONE ENCOUNTER
Patient called to check on the status of her disability paperwork that was scanned into media 9/21 for [facility] patient would like to receive a call please contact pt at [phone]

## 2020-10-14 ENCOUNTER — OFFICE VISIT (OUTPATIENT)
Dept: CARDIOLOGY CLINIC | Age: 70
End: 2020-10-14
Payer: MEDICARE

## 2020-10-14 VITALS
TEMPERATURE: 97.9 F | BODY MASS INDEX: 35.71 KG/M2 | DIASTOLIC BLOOD PRESSURE: 86 MMHG | WEIGHT: 228 LBS | SYSTOLIC BLOOD PRESSURE: 132 MMHG | HEART RATE: 84 BPM

## 2020-10-14 PROCEDURE — 99214 OFFICE O/P EST MOD 30 MIN: CPT | Performed by: INTERNAL MEDICINE

## 2020-10-14 PROCEDURE — 93000 ELECTROCARDIOGRAM COMPLETE: CPT | Performed by: INTERNAL MEDICINE

## 2020-10-14 NOTE — PROGRESS NOTES
[address]  Office Visit           Lana Pollack MD,  [facility]                      Cardiology           Lydia Eng  1950 October 14, 2020         HPI:  The patient is 69 y.o. female with is here is seen in the office today for follow-up of her cardiac issues. She has left chest discomfort. The pacing site looks very good to me and is healing very nicely. Some tenderness in the left anterior chest wall area and left upper rib areas. The abdomen is soft and extremities show full range of motion without clubbing or edema. She relates to me that she did have abdominal pain ectomy from her significant weight loss. The surgical wounds are evaluated and seem to be healed very nicely. From a cardiac perspective all looks stable. We did review her angiogram from October 2017 with clean coronary arteries. Examination today otherwise is unremarkable and there is no edema. [facility] normal  cors 10/2017   / neg stress test in 2018   Left heart cath June 28, 2019 normal coronaries  No SOB no wt gain no PND SHERRI   HTN SSS/ SJM dual chamber PPM    Chronic atrial fibrillation on Eliquis        Review of Systems:  Constitutional: Denies  fatigue, weakness, night sweats or fever. HEENT: Denies new visual changes, ringing in ears, nosebleeds,nasal congestion  Respiratory: Denies new or change in SOB, PND, orthopnea or cough. Cardiovascular: see HPI  GI: Denies N/V, diarrhea, constipation, abdominal pain, change in bowel habits, melena or hematochezia  : Denies urinary frequency, urgency, incontinence, hematuria or dysuria. Skin: Denies rash, hives, or cyanosis  Musculoskeletal: Denies joint or muscle aches/pain  Neurological: Denies syncope or TIA-like symptoms.   Psychiatric: Denies anxiety, insomnia or depression     Past Medical History:   Diagnosis Date    Chronic kidney disease     Hypertension      Past Surgical History:   Procedure Laterality Date    CARDIAC PACEMAKER PLACEMENT  PACEMAKER PLACEMENT       Family History   Problem Relation Age of Onset    Diabetes Mother     Heart Disease Mother     Heart Failure Sister      Social History     Tobacco Use    Smoking status: Never Smoker    Smokeless tobacco: Never Used   Substance Use Topics    Alcohol use: Never     Frequency: Never    Drug use: Never       Allergies   Allergen Reactions    Contrast [Iodides] Nausea And Vomiting    Morphine Nausea And Vomiting     Current Outpatient Medications   Medication Sig Dispense Refill    flecainide (TAMBOCOR) 50 MG tablet TAKE 1 TABLET BY MOUTH TWICE DAILY 60 tablet 5    isosorbide dinitrate (ISORDIL) 10 MG tablet Take 1 tablet by mouth 2 times daily 90 tablet 3    hydrALAZINE (APRESOLINE) 10 MG tablet Take 1 tablet by mouth 2 times daily 90 tablet 3    apixaban (ELIQUIS) 5 MG TABS tablet Take 1 tablet by mouth 2 times daily 180 tablet 3    metoprolol succinate (TOPROL XL) 25 MG extended release tablet Take 1 tablet by mouth daily 30 tablet 3    Coenzyme Q10 50 MG CAPS Take 50 mg by mouth      calcium carbonate (OSCAL) 500 MG TABS tablet Take 500 mg by mouth daily      vitamin B-12 (CYANOCOBALAMIN) 100 MCG tablet Take 50 mcg by mouth daily      Multiple Vitamins-Minerals (THERAPEUTIC MULTIVITAMIN-MINERALS) tablet Take 1 tablet by mouth daily       No current facility-administered medications for this visit. Physical Exam:   /86   Pulse 84   Temp 97.9 °F (36.6 °C)   Wt 228 lb (103.4 kg)   BMI 35.71 kg/m²   BP Readings from Last 3 Encounters:   10/14/20 132/86   09/01/20 118/88   05/28/20 132/80     Pulse Readings from Last 3 Encounters:   10/14/20 84   09/01/20 60   05/28/20 60     Wt Readings from Last 3 Encounters:   10/14/20 228 lb (103.4 kg)   09/01/20 224 lb (101.6 kg)   05/28/20 225 lb 3.2 oz (102.2 kg)     Constitutional: She is oriented to person, place, and time. She appears well-developed and well-nourished. In no acute distress.    HEENT: Normocephalic and atraumatic. Sclerae anicteric. No xanthelasmas. Conjunctiva white, no subconjunctival hemorrhage   External inspection of ears nose teeth & gums   Eyes:PERRLA EOM's intact. Neck: Neck supple. No JVD present. Carotids without bruits. No mass and no thyromegaly present. No lymphadenopathy present. Cardiovascular: RRR, normal S1 and S2; no murmur/gallop or rub, PMI nondisplaced  Pulmonary/Chest: Effort normal.  Lungs clear to auscultation. Chest wall nontender  Abdominal: soft, nontender, nondistended. + bowel sounds; no organomegaly or bruits. Aorta normal  Extremities: No edema, cyanosis, or clubbing. Pulses are 2+ radial/carotid/dorsalis pedis bilaterally. Cap refill brisk. Neurological: No cranial nerve deficit. Psychiatric: She has a normal mood and affect. Her speech is normal and behavior is normal.     Lab Review:   No results found for: TRIG, HDL, LDLCALC, LDLDIRECT, LABVLDL  Lab Results   Component Value Date     02/28/2020    K 4.9 02/28/2020     02/28/2020    CO2 26 02/28/2020    BUN 15 02/28/2020    CREATININE 0.6 02/28/2020    GLUCOSE 102 02/28/2020    CALCIUM 10.1 02/28/2020      Lab Results   Component Value Date    WBC 4.3 02/28/2020    HGB 11.8 (L) 02/28/2020    HCT 36.6 02/28/2020    MCV 87.4 02/28/2020     02/28/2020       Assessment:    Pacemaker leads seems to potentially have some decreased impedance. I think they are still working adequately. And she is not dependent on the pacemaker at this time. We will continue to monitor her. She has about 4 and half years of battery life. Regarding the burning in her left calf I think it may be some type of neuropathy of sorts. She does not have diabetes and her last labs done late April 2019 were unremarkable. EKG on 10/14/2020 done with magnet shows ventricular paced rhythm at 100/min. It is atrial sensed. On 10/14/2020 EKG without magnet shows sinus rhythm at 65/min. Normal study.   [facility] normal  cors 10/2017   / neg stress test in 2018   Summary 9/26/18   Left ventricular cavity size is normal with mild concentric left ventricular   hypertrophy. Left ventricular function is mildly reduced with ejection fraction estimated   at 45-50%. Lateral wall and posterolateral wall appears hypokinetic. Diastolic filling parameters suggest grade I diastolic dysfunction. Aortic valve appears slightly thickened but opens adequately. Mild tricuspid regurgitation. Estimated pulmonary artery systolic pressure is at 37 mmHg assuming a right   atrial pressure of 3 mmHg. Summary 9/21/20   Left ventricular cavity size is normal with mild left ventricular   hypertrophy. Overall left ventricular systolic function appears mildly reduced with an   ejection fraction of 45-50%. There is hypokinesis of the lateral and posterolateral walls. Normal diastolic function. Estimated pulmonary artery systolic pressure is at 18 mmHg assuming a right   atrial pressure of 3 mmHg. HTN  optimal    SSS/ SJM dual chamber PPM    11/2018   SJM  Interrogation [facility] [place] dual chamber ACCENT   assessed & reprogrammed   Battery life good  Noise noted on lead / assessed by rep   A paced/ 85%  V paced<1%   afib <1% / 22 sec afib but looks like noise     SHERRI   usues CPAP regularly       Plan: At this time we will continue her current therapy. She is to return to see us in about 3 months. Consider cardiac cath. Rui Perez MD, [facility] [place]

## 2020-11-16 RX ORDER — METOPROLOL SUCCINATE 25 MG/1
25 TABLET, EXTENDED RELEASE ORAL DAILY
Qty: 90 TABLET | Refills: 3 | Status: SHIPPED | OUTPATIENT
Start: 2020-11-16 | End: 2020-11-23 | Stop reason: CLARIF

## 2020-11-16 NOTE — TELEPHONE ENCOUNTER
Requested Prescriptions     Pending Prescriptions Disp Refills    metoprolol succinate (TOPROL XL) 25 MG extended release tablet 90 tablet 3     Sig: Take 1 tablet by mouth daily          Number: 90    Refills: 3    Last Office Visit: 5/28/2020     Next Office Visit: 11/23/2020     Last Labs: 2/28/20

## 2020-11-16 NOTE — TELEPHONE ENCOUNTER
Pt called in for medication refill metoprolol succinate (TOPROL XL) 25 MG extended release tablet. Pt is out of refills. Pt would like to no if she can receive a 3 month supply of medication. Pt can be reached at [phone] to address this matter.      Last refill:04-14-20  Last visit:10-14-20  Next visit:11-23-20      [address]   [phone]

## 2020-11-23 ENCOUNTER — TELEPHONE (OUTPATIENT)
Dept: CARDIOLOGY CLINIC | Age: 70
End: 2020-11-23

## 2020-11-23 RX ORDER — METOPROLOL SUCCINATE 25 MG/1
25 TABLET, EXTENDED RELEASE ORAL 2 TIMES DAILY
Qty: 180 TABLET | Refills: 1 | Status: SHIPPED | OUTPATIENT
Start: 2020-11-23 | End: 2020-11-23

## 2020-11-23 RX ORDER — METOPROLOL SUCCINATE 25 MG/1
25 TABLET, EXTENDED RELEASE ORAL DAILY
Qty: 90 TABLET | Refills: 1 | Status: SHIPPED | OUTPATIENT
Start: 2020-11-23 | End: 2021-12-07 | Stop reason: SDUPTHER

## 2020-11-23 RX ORDER — FLECAINIDE ACETATE 50 MG/1
50 TABLET ORAL 2 TIMES DAILY
Qty: 180 TABLET | Refills: 1 | Status: SHIPPED | OUTPATIENT
Start: 2020-11-23 | End: 2021-01-07 | Stop reason: SDUPTHER

## 2020-11-23 NOTE — TELEPHONE ENCOUNTER
Spoke to patient this morning to r/s her device and ov w/FW (provider out of office)  Patient had question about the Metoprolol RX. Informed patient it was approved and sent to [facility]. Patient requesting it be sent to [facility] on [address]. Resent Metoprolol to [facility]. Appointment r/s to 12/2/20  Patient would like the RX Flecainide be changed to 90 day supply (cost less).   Advised will send to provider for approval.      I Medication Refills:    Medication:Flecainide    Dosage: 50 mg    Number: 180    Refills: 1    Last Office Visit:     Next Office Visit: 12/02/2020    Last Refill: 09/28/2020    Last Labs: 2/28/20 (INR, CBC, BMP)

## 2020-12-01 NOTE — PROGRESS NOTES
[address]   Electrophysiology  Office Visit  Date: 12/2/2020    Chief Complaint   Patient presents with    Bradycardia    Atrial Fibrillation    Hypertension       Cardiac HX: Amrit Salmon is a 69 y.o. woman with h/o HTN, HLD, SSS, SHERRI on CPAP, s/p dual chamber SJM PPM (Dr. Rosalind Traore, 7/31/14), recent dx of AF on PPM, [facility] [place], [place] visiting her sister, developed CP, went to ED, + MPI, s/p LHC (6/2019) showed nor cors. In f/u noted to have 45 min of AF on PPM device check, noise noted on device,  placed on Eliquis, s/p RA/RV lead revision (3/3/20, Dr. Mil Nuñez). Interval History/HPI: Patient is here to follow-up for sick sinus syndrome, pacemaker management and paroxysmal atrial fibrillation. She had undergone a right atrial and right ventricular lead revision in March 2020 as noise was seen on the leads as well as a drop in lead impedance. She was placed on Eliquis, flecainide and metoprolol for paroxysmal atrial fibrillation noted on device. Her device check today shows no evidence of arrhythmias. She atrially paces 96% of the time and ventricularly paces 1% of the time. Her blood pressure is on the high side today. She states it is been running higher at recent physician office visits. She has not been monitoring it at home. She denies chest pain, shortness of breath, PND, orthopnea or lower extremity edema. Home medications:   Current Outpatient Medications on File Prior to Visit   Medication Sig Dispense Refill    traMADol (ULTRAM) 50 MG tablet Take 50 mg by mouth every 8 hours as needed.       metoprolol succinate (TOPROL XL) 25 MG extended release tablet Take 1 tablet by mouth daily 90 tablet 1    flecainide (TAMBOCOR) 50 MG tablet Take 1 tablet by mouth 2 times daily 180 tablet 1    isosorbide dinitrate (ISORDIL) 10 MG tablet Take 1 tablet by mouth 2 times daily 90 tablet 3    apixaban (ELIQUIS) 5 MG TABS tablet Take 1 tablet by mouth 2 times daily 180 tablet 3  Coenzyme Q10 50 MG CAPS Take 50 mg by mouth      calcium carbonate (OSCAL) 500 MG TABS tablet Take 500 mg by mouth daily      vitamin B-12 (CYANOCOBALAMIN) 100 MCG tablet Take 50 mcg by mouth daily      Multiple Vitamins-Minerals (THERAPEUTIC MULTIVITAMIN-MINERALS) tablet Take 1 tablet by mouth daily      methocarbamol (ROBAXIN) 750 MG tablet Take 750 mg by mouth daily       No current facility-administered medications on file prior to visit. Past Medical History:   Diagnosis Date    Chronic kidney disease     Hypertension         Past Surgical History:   Procedure Laterality Date    CARDIAC PACEMAKER PLACEMENT      PACEMAKER PLACEMENT         Allergies   Allergen Reactions    Contrast [Iodides] Nausea And Vomiting    Morphine Nausea And Vomiting       Social History:  Reviewed. reports that she has never smoked. She has never used smokeless tobacco. She reports that she does not drink alcohol or use drugs. Family History:  Reviewed. family history includes Diabetes in her mother; Heart Disease in her mother; Heart Failure in her sister. Review of System:    · Constitutional: No fevers, chills. · Eyes: No visual changes or diplopia. No scleral icterus. · ENT: No Headaches. No mouth sores or sore throat. · Cardiovascular: No for chest pain, No for dyspnea on exertion, Yes for palpitations or No for loss of consciousness. No cough, hemoptysis, No for pleuritic pain, or phlebitis. · Respiratory: No for cough or wheezing. No hematemesis. · Gastrointestinal: No abdominal pain, blood in stools. · Genitourinary: No dysuria, or hematuria. · Musculoskeletal: No gait disturbance,    · Integumentary: No rash or pruritis. · Neurological: No headache, change in muscle strength, numbness or tingling. · Psychiatric: No anxiety, or depression. · Endocrine: No temperature intolerance. No excessive thirst, fluid intake, or urination.    · Hem/Lymph: No abnormal bruising or bleeding, blood clots or swollen lymph nodes. · Allergic/Immunologic: No nasal congestion or hives. Physical Examination:  Vitals:    12/02/20 1532   BP: 136/86   Pulse: 60   Temp: 96.9 °F (36.1 °C)         Wt Readings from Last 3 Encounters:   12/02/20 234 lb 12.8 oz (106.5 kg)   10/14/20 228 lb (103.4 kg)   09/01/20 224 lb (101.6 kg)       · Constitutional: Oriented. No distress. · Head: Normocephalic and atraumatic. · Mouth/Throat: Oropharynx is clear and moist.   · Eyes: Conjunctivae clear without jaunduice. PERRL. · Neck: Neck supple. No rigidity. No JVD present. · Cardiovascular: Normal rate, regular rhythm, S1&S2. · Pulmonary/Chest: Bilateral respiratory sounds. No wheezes, No rhonchi. · Abdominal: Soft. Bowel sounds present. No distension, No tenderness. · Musculoskeletal: No tenderness. No edema    · Lymphadenopathy: Has no cervical adenopathy. · Neurological: Alert and oriented. Cranial nerve appears intact, No Gross deficit   · Skin: Skin is warm and dry. No rash noted. · Psychiatric: Has a normal mood, affect and behavior     Labs:  Reviewed. No results for input(s): NA, K, CL, CO2, PHOS, BUN, CREATININE in the last 72 hours. Invalid input(s): CA,  TSH  No results for input(s): WBC, HGB, HCT, MCV, PLT in the last 72 hours. No results found for: CKTOTAL, CKMB, CKMBINDEX, TROPONINI  No results found for: BNP  Lab Results   Component Value Date    PROTIME 13.0 02/28/2020    INR 1.12 02/28/2020     No results found for: CHOL, HDL, TRIG    ECG: Personally reviewed: A paced, V sensed, HR 60, , QRS 84, QTc 400    ECHO:  9/26/2018  Summary   Left ventricular cavity size is normal with mild concentric left ventricular   hypertrophy. Left ventricular function is mildly reduced with ejection fraction estimated   at 45-50%. Lateral wall and posterolateral wall appears hypokinetic. Diastolic filling parameters suggest grade I diastolic dysfunction.    Aortic valve appears slightly thickened but opens adequately. Mild tricuspid regurgitation. Estimated pulmonary artery systolic pressure is at 37 mmHg assuming a right   atrial pressure of 3 mmHg. Stress Test: 9/26/2018  No reversible ischemia    Cardiac Angiography: n/a    Problem List:   Patient Active Problem List    Diagnosis Date Noted    Pacemaker [facility] [place] [employer] 07/31/2014     Priority: High    SSS (sick sinus syndrome) 06/21/2012     Priority: High    HTN (hypertension) 06/21/2012     Priority: High    PAF (paroxysmal atrial fibrillation) ([facility] [address] 04/02/2020        Assessment:   1. PAF (paroxysmal atrial fibrillation) ([address]    2. SSS (sick sinus syndrome) ([facility] [address]    3. Pacemaker    4. Benign essential HTN    5. Nonischemic cardiomyopathy ([facility] [address]         Cardiac HX: Sinai Thapa is a 69 y.o. woman with h/o HTN, HLD, SSS, SHERRI on CPAP, s/p dual chamber SJM PPM (Dr. Jess Leon, 7/31/14), recent dx of AF on PPM, had been in [facility]. [place] visiting her sister, developed CP, went to ED, + MPI, s/p LHC (6/2019) showed nor cors. In f/u noted to have 45 min of AF on PPM device check, noise noted on device,  placed on Eliquis, s/p RA/RV lead revision (3/3/20, Dr. Mariposa Gibbs). [id number] 4. TSH 1.17 (1/17/20). SSS  - S/p dual chamber SJM PPM  - S/p RA/RV lead revision to MDT leads  - Device check today shows 96% AP, less than 1% , no arrhythmias, other parameters stable.      pAF  - In NSR  - No AF seen on device  - On apixban 5 mg BID - no s/s bleeding - continue  - On flecainide 50 mg twice daily  - Toprol XL 25 mg daily  - ECG ordered and results personally reviewed     CMP  - EF 45-50%  - On hydralazine 10 mg twice daily and isosorbide 10 mg twice daily  - On Toprol XL 25 mg QD  - Repeat echo when pandemic resolves    HTN  - BP on the high side today  - We will increase hydralazine to 25 mg twice a day  - Patient to monitor blood pressure at home  - Follow-up in 4 weeks with blood pressure log  - Lifestyle modification -diet, exercise, weight loss     EF of 11-83%  ACEi for systolic HF  ASA and Statin for CAD  Anticoagulation for AF and heart failure  No Tobacco use. All questions and concerns were addressed to the patient/family. Alternatives to my treatment were discussed. The note was completed using EMR. Every effort was made to ensure accuracy; however, inadvertent computerized transcription errors may be present. Patient received education regarding their diagnosis, treatment and medications while in the office today.       Tila Newton [address]

## 2020-12-02 ENCOUNTER — NURSE ONLY (OUTPATIENT)
Dept: CARDIOLOGY CLINIC | Age: 70
End: 2020-12-02
Payer: MEDICARE

## 2020-12-02 ENCOUNTER — OFFICE VISIT (OUTPATIENT)
Dept: CARDIOLOGY CLINIC | Age: 70
End: 2020-12-02
Payer: MEDICARE

## 2020-12-02 VITALS
SYSTOLIC BLOOD PRESSURE: 136 MMHG | HEIGHT: 67 IN | WEIGHT: 234.8 LBS | BODY MASS INDEX: 36.85 KG/M2 | TEMPERATURE: 96.9 F | HEART RATE: 60 BPM | DIASTOLIC BLOOD PRESSURE: 86 MMHG

## 2020-12-02 PROCEDURE — 99214 OFFICE O/P EST MOD 30 MIN: CPT | Performed by: NURSE PRACTITIONER

## 2020-12-02 PROCEDURE — 93280 PM DEVICE PROGR EVAL DUAL: CPT | Performed by: INTERNAL MEDICINE

## 2020-12-02 PROCEDURE — 93000 ELECTROCARDIOGRAM COMPLETE: CPT | Performed by: NURSE PRACTITIONER

## 2020-12-02 RX ORDER — HYDRALAZINE HYDROCHLORIDE 25 MG/1
25 TABLET, FILM COATED ORAL 2 TIMES DAILY
Qty: 60 TABLET | Refills: 5 | Status: SHIPPED | OUTPATIENT
Start: 2020-12-02 | End: 2020-12-17 | Stop reason: SDUPTHER

## 2020-12-02 RX ORDER — TRAMADOL HYDROCHLORIDE 50 MG/1
50 TABLET ORAL EVERY 8 HOURS PRN
COMMUNITY
Start: 2020-11-30 | End: 2020-12-07

## 2020-12-02 RX ORDER — METHOCARBAMOL 750 MG/1
750 TABLET, FILM COATED ORAL DAILY
COMMUNITY
Start: 2020-10-02

## 2020-12-02 NOTE — PROGRESS NOTES
Patient comes into the office today for a device check and ov w/NPFW. Lead revision 3/3/20 by UL ([employer] leads)    Interrogation shows normal device function. All sensing and pacing parameters within normal range. 9.1-9.8 years to TIM  Presenting ApVs @ 65 bpm  Underlying AsVs @ 50 bpm  AP 96%   <1%  AT/AF burden 0%  0 episodes detected  Patient currently takes metoprolol, flecainide, eliquis  No changes made during this session  See Paceart report under the Cardiology tab. Patient will follow up in 3 months in office and/or remotely.

## 2020-12-02 NOTE — PATIENT INSTRUCTIONS
20 Tips For Changing the Way You Eat    1. If you think you are hungry, drink a glass of water or a cup of coffee before eating. 2. Eat s-l-o-w-l-y! It takes your brain 20 minutes to catch up to your stomach and by then most of us have overeaten. 3. Eat for 10 minutes and rest for 5 minutes. If you are still hungry, start over. 4. Eat to live not live to eat! You control food, it does not control you! 5. Your stomach is the size of your fist. If you eat more than that, you have overeaten. 6. Eat when you are hungry and not because it is a scheduled time to eat. 7. If you are hungry and it is not time yet to eat your meal, eat a small snack (such as 2 peanut butter crackers, 1 tsp of peanut butter, 15 peanuts, small apple or a cup of light popcorn). Make sure you take 10 minutes to eat this snack so it will hold you over to your next meal.    8. Eat your fruit first as it will help breakdown you meal.    9. If given a lot of options at a meal - pick only 3. People who put more than one item on their plate tend to eat more. 10. Eat the one item you want the most - first! This will help fill you up more and control your hunger. 11. Get moving! Get a pedometer and try to get in as many steps as you can. 2000 steps = 1 mile. Make 10,000 steps a day your goal.    12. [place] are the only country that practices eating to prevent getting hungry. 13. Identify if you are an emotional eater - you want something salty or sweet instead of something healthy or you want to eat when you are not hungry. 14. Sugar contributes nothing in the way of nutrients. 15. Measure your level of hunger. The hungrier you are, the more fat you burn when you eat. 16. If you eat dessert more than 30 minutes after a meal, it turns to fat. 17. The calories in alcohol are used before stored fat calories. 18. It is ok to miss a meal if you are not hungry.     19. If snacks are not around the house, then they wont tempt you. Make better choices at the store for those times when you are really hungry. 20. Remember that food will only make you feel better when you are really hungry not when you are sad, mad, stressed or depressed.

## 2020-12-10 ENCOUNTER — TELEPHONE (OUTPATIENT)
Dept: CARDIOLOGY CLINIC | Age: 70
End: 2020-12-10

## 2020-12-10 NOTE — TELEPHONE ENCOUNTER
Called pt   doesn't need abx   She  is on Eliquis  / I informed her to be sure she inform dentist she is on eliquis and may hold a day if needed  Vaishnavi Macario

## 2020-12-10 NOTE — TELEPHONE ENCOUNTER
Patient needs to make appointment with her dentist. Does she need antibiotics. Does she need to stop any medicine.  Please call and advise  [phone]

## 2020-12-17 RX ORDER — HYDRALAZINE HYDROCHLORIDE 25 MG/1
25 TABLET, FILM COATED ORAL 2 TIMES DAILY
Qty: 60 TABLET | Refills: 5 | Status: SHIPPED | OUTPATIENT
Start: 2020-12-17 | End: 2021-03-03 | Stop reason: SDUPTHER

## 2020-12-17 NOTE — TELEPHONE ENCOUNTER
[facility] Medication Refills:    hydrALAZINE (APRESOLINE) 25 MG tablet  Take 1 tablet by mouth 2 times daily, Disp-60 tablet,R-5    Pharm[facility] [address] [place] - [address] [phone] - [phone]    The patient states that the pharmacy did not receive this prescription request     Last Office Visit: 12/02/20    Next Office Visit: 01/07/21    Last Refill: 12/02/20    Last Labs: 02/28/20

## 2020-12-21 RX ORDER — APIXABAN 5 MG/1
TABLET, FILM COATED ORAL
Qty: 60 TABLET | Refills: 3 | Status: SHIPPED | OUTPATIENT
Start: 2020-12-21 | End: 2021-04-15 | Stop reason: SDUPTHER

## 2020-12-30 NOTE — PROGRESS NOTES
[address]   Electrophysiology  Office Visit  Date: 1/7/2021    Chief Complaint   Patient presents with    Atrial Fibrillation    Bradycardia    Hypertension       Cardiac HX: Lashay Stanley is a 69 y.o. woman with h/o HTN, HLD, SSS, SHERRI on CPAP, s/p dual chamber SJM PPM (Dr. Eduin Milton, 7/31/14), recent dx of AF on PPM, had been in [facility]. [place], [place] visiting her sister, developed CP, went to ED, + MPI, s/p LHC (6/2019) showed nor cors. In f/u noted to have 45 min of AF on PPM device check, noise noted on device,  placed on Eliquis, s/p RA/RV lead revision (3/3/20, Dr. Isha Mckeon). Interval History/HPI: Patient is here for follow-up for HTN, SSS, pAF and PPM management. Patient underwent on RA and RV lead revision in March 2020 after noise was noted on the device. Device check today shows 96% AP, less than 1% , no arrhythmias, other parameters stable. She had noted to have a 45-minute episode of atrial fibrillation on a device check and currently is on Eliquis with no signs of bleeding. She has not felt any heart racing, palpitations or irregular heartbeats. She had been seen at the beginning of December and blood pressure was noted to be elevated. Her hydralazine was increased at that time and today she presents in the office with a blood pressure of 122/72. Patient states that her blood pressure is running 1 13-1 30 over the low 70s at home. She does have a history of a cardiomyopathy with an EF of 45 to 50%. We are planning to do a repeat echocardiogram when the risk of francisco Covid is over. She denies chest pain, shortness of breath, PND, orthopnea or lower extremity edema. Her weight is up 20 pounds from April. We had a long discussion regarding lifestyle modification including diet and exercise.     Home medications:   Current Outpatient Medications on File Prior to Visit   Medication Sig Dispense Refill    ELIQUIS 5 MG TABS tablet TAKE 1 TABLET BY MOUTH TWICE DAILY 60 tablet 3  hydrALAZINE (APRESOLINE) 25 MG tablet Take 1 tablet by mouth 2 times daily 60 tablet 5    methocarbamol (ROBAXIN) 750 MG tablet Take 750 mg by mouth daily      metoprolol succinate (TOPROL XL) 25 MG extended release tablet Take 1 tablet by mouth daily 90 tablet 1    isosorbide dinitrate (ISORDIL) 10 MG tablet Take 1 tablet by mouth 2 times daily 90 tablet 3    Coenzyme Q10 50 MG CAPS Take 50 mg by mouth      calcium carbonate (OSCAL) 500 MG TABS tablet Take 500 mg by mouth daily      vitamin B-12 (CYANOCOBALAMIN) 100 MCG tablet Take 50 mcg by mouth daily      Multiple Vitamins-Minerals (THERAPEUTIC MULTIVITAMIN-MINERALS) tablet Take 1 tablet by mouth daily       No current facility-administered medications on file prior to visit. Past Medical History:   Diagnosis Date    Chronic kidney disease     Hypertension         Past Surgical History:   Procedure Laterality Date    CARDIAC PACEMAKER PLACEMENT      PACEMAKER PLACEMENT         Allergies   Allergen Reactions    Contrast [Iodides] Nausea And Vomiting    Morphine Nausea And Vomiting       Social History:  Reviewed. reports that she has never smoked. She has never used smokeless tobacco. She reports that she does not drink alcohol or use drugs. Family History:  Reviewed. family history includes Diabetes in her mother; Heart Disease in her mother; Heart Failure in her sister. Review of System:    · Constitutional: No fevers, chills. · Eyes: No visual changes or diplopia. No scleral icterus. · ENT: No Headaches. No mouth sores or sore throat. · Cardiovascular: No for chest pain, No for dyspnea on exertion, Yes for palpitations or No for loss of consciousness. No cough, hemoptysis, No for pleuritic pain, or phlebitis. · Respiratory: No for cough or wheezing. No hematemesis. · Gastrointestinal: No abdominal pain, blood in stools. · Genitourinary: No dysuria, or hematuria.   · Musculoskeletal: No gait disturbance, · Integumentary: No rash or pruritis. · Neurological: No headache, change in muscle strength, numbness or tingling. · Psychiatric: No anxiety, or depression. · Endocrine: No temperature intolerance. No excessive thirst, fluid intake, or urination. · Hem/Lymph: No abnormal bruising or bleeding, blood clots or swollen lymph nodes. · Allergic/Immunologic: No nasal congestion or hives. Physical Examination:  Vitals:    01/07/21 1409   BP: 122/72   Pulse: 60   Temp: 97.1 °F (36.2 °C)         Wt Readings from Last 3 Encounters:   01/07/21 236 lb (107 kg)   12/02/20 234 lb 12.8 oz (106.5 kg)   10/14/20 228 lb (103.4 kg)       · Constitutional: Oriented. No distress. · Head: Normocephalic and atraumatic. · Mouth/Throat: Oropharynx is clear and moist.   · Eyes: Conjunctivae clear without jaunduice. PERRL. · Neck: Neck supple. No rigidity. No JVD present. · Cardiovascular: Normal rate, regular rhythm, S1&S2. · Pulmonary/Chest: Bilateral respiratory sounds. No wheezes, No rhonchi. · Abdominal: Soft. Bowel sounds present. No distension, No tenderness. · Musculoskeletal: No tenderness. No edema    · Lymphadenopathy: Has no cervical adenopathy. · Neurological: Alert and oriented. Cranial nerve appears intact, No Gross deficit   · Skin: Skin is warm and dry. No rash noted. · Psychiatric: Has a normal mood, affect and behavior     Labs:  Reviewed. No results for input(s): NA, K, CL, CO2, PHOS, BUN, CREATININE in the last 72 hours. Invalid input(s): CA,  TSH  No results for input(s): WBC, HGB, HCT, MCV, PLT in the last 72 hours.   No results found for: CKTOTAL, CKMB, CKMBINDEX, TROPONINI  No results found for: BNP  Lab Results   Component Value Date    PROTIME 13.0 02/28/2020    INR 1.12 02/28/2020     No results found for: CHOL, HDL, TRIG    ECG: Personally reviewed: A paced, V sensed, HR 60, , QRS 86, QTc 416    ECHO:  9/26/2018  Summary   Left ventricular cavity size is normal with mild concentric left ventricular   hypertrophy. Left ventricular function is mildly reduced with ejection fraction estimated   at 45-50%. Lateral wall and posterolateral wall appears hypokinetic. Diastolic filling parameters suggest grade I diastolic dysfunction. Aortic valve appears slightly thickened but opens adequately. Mild tricuspid regurgitation. Estimated pulmonary artery systolic pressure is at 37 mmHg assuming a right   atrial pressure of 3 mmHg. Stress Test: 9/26/2018  No reversible ischemia    Cardiac Angiography: n/a    Problem List:   Patient Active Problem List    Diagnosis Date Noted    Pacemaker [facility] 07/31/2014     Priority: High    SSS (sick sinus syndrome) 06/21/2012     Priority: High    HTN (hypertension) 06/21/2012     Priority: High    PAF (paroxysmal atrial fibrillation) ([address] 04/02/2020        Assessment:   1. SSS (sick [facility] syndrome) ([facility]    2. PAF (paroxysmal atrial fibrillation) ([facility] [facility]    3. Pacemaker    4. Essential hypertension    5. Dilated cardiomyopathy ([facility] [facility]         Cardiac HX: Lashay Stanley is a 69 y.o. woman with h/o HTN, HLD, SSS, SHERRI on CPAP, s/p dual chamber SJM PPM (Dr. Eduin Milton, 7/31/14), recent dx of AF on PPM, had been in [facility]. [place], [place] visiting her sister, developed CP, went to ED, + MPI, s/p LHC (6/2019) showed nor cors. In f/u noted to have 45 min of AF on PPM device check, noise noted on device,  placed on Eliquis, s/p RA/RV lead revision (3/3/20, Dr. Isha Mckeon). [id number] 4. TSH 1.17 (1/17/20). SSS  - S/p dual chamber SJM PPM  - S/p RA/RV lead revision to MDT leads  - Device check today shows 96% AP, less than 1% , no arrhythmias, other parameters stable.      pAF  - In NSR  - No AF seen on device  - On apixban 5 mg BID - no s/s bleeding - continue  - On flecainide 50 mg twice daily - QRS 86  - Toprol XL 25 mg daily  - ECG ordered and results personally reviewed     CMP  - EF 45-50%  - On hydralazine 10 mg twice daily and isosorbide 10 mg twice daily  - On Toprol XL 25 mg QD  - Repeat echo when pandemic resolves    HTN  - BP better controlled  - On hydralazine to 25 mg twice a day  - Lifestyle modification -diet, exercise, weight loss     EF of 77-92%  ACEi for systolic HF  ASA and Statin for CAD  Anticoagulation for AF and heart failure  No Tobacco use. F/u in 6 months with device check. All questions and concerns were addressed to the patient/family. Alternatives to my treatment were discussed. The note was completed using EMR. Every effort was made to ensure accuracy; however, inadvertent computerized transcription errors may be present. Patient received education regarding their diagnosis, treatment and medications while in the office today.       Anjelica Cormier [address]

## 2021-01-07 ENCOUNTER — OFFICE VISIT (OUTPATIENT)
Dept: CARDIOLOGY CLINIC | Age: 71
End: 2021-01-07
Payer: MEDICARE

## 2021-01-07 ENCOUNTER — NURSE ONLY (OUTPATIENT)
Dept: CARDIOLOGY CLINIC | Age: 71
End: 2021-01-07
Payer: MEDICARE

## 2021-01-07 VITALS
HEART RATE: 60 BPM | BODY MASS INDEX: 37.04 KG/M2 | WEIGHT: 236 LBS | SYSTOLIC BLOOD PRESSURE: 122 MMHG | TEMPERATURE: 97.1 F | HEIGHT: 67 IN | DIASTOLIC BLOOD PRESSURE: 72 MMHG

## 2021-01-07 DIAGNOSIS — I49.5 SSS (SICK SINUS SYNDROME) (HCC): Primary | ICD-10-CM

## 2021-01-07 DIAGNOSIS — Z95.0 PACEMAKER: ICD-10-CM

## 2021-01-07 DIAGNOSIS — I48.0 PAF (PAROXYSMAL ATRIAL FIBRILLATION) (HCC): ICD-10-CM

## 2021-01-07 DIAGNOSIS — I42.0 DILATED CARDIOMYOPATHY (HCC): ICD-10-CM

## 2021-01-07 DIAGNOSIS — I49.5 SSS (SICK SINUS SYNDROME) (HCC): ICD-10-CM

## 2021-01-07 DIAGNOSIS — I10 ESSENTIAL HYPERTENSION: ICD-10-CM

## 2021-01-07 PROCEDURE — 99214 OFFICE O/P EST MOD 30 MIN: CPT | Performed by: NURSE PRACTITIONER

## 2021-01-07 PROCEDURE — 93000 ELECTROCARDIOGRAM COMPLETE: CPT | Performed by: NURSE PRACTITIONER

## 2021-01-07 PROCEDURE — 93280 PM DEVICE PROGR EVAL DUAL: CPT | Performed by: INTERNAL MEDICINE

## 2021-01-07 RX ORDER — FLECAINIDE ACETATE 50 MG/1
50 TABLET ORAL 2 TIMES DAILY
Qty: 180 TABLET | Refills: 3 | Status: SHIPPED | OUTPATIENT
Start: 2021-01-07 | End: 2022-02-04 | Stop reason: SDUPTHER

## 2021-01-07 NOTE — PATIENT INSTRUCTIONS
20 Tips For Changing the Way You Eat    1. If you think you are hungry, drink a glass of water or a cup of coffee before eating. 2. Eat s-l-o-w-l-y! It takes your brain 20 minutes to catch up to your stomach and by then most of us have overeaten. 3. Eat for 10 minutes and rest for 5 minutes. If you are still hungry, start over. 4. Eat to live not live to eat! You control food, it does not control you! 5. Your stomach is the size of your fist. If you eat more than that, you have overeaten. 6. Eat when you are hungry and not because it is a scheduled time to eat. 7. If you are hungry and it is not time yet to eat your meal, eat a small snack (such as 2 peanut butter crackers, 1 tsp of peanut butter, 15 peanuts, small apple or a cup of light popcorn). Make sure you take 10 minutes to eat this snack so it will hold you over to your next meal.    8. Eat your fruit first as it will help breakdown you meal.    9. If given a lot of options at a meal  pick only 3. People who put more than one item on their plate tend to eat more. 10. Eat the one item you want the most  first! This will help fill you up more and control your hunger. 11. Get moving! Get a pedometer and try to get in as many steps as you can. 2000 steps = 1 mile. Make 10,000 steps a day your goal.    12. [place] are the only country that practices eating to prevent getting hungry. 13. Identify if you are an emotional eater  you want something salty or sweet instead of something healthy or you want to eat when you are not hungry. 14. Sugar contributes nothing in the way of nutrients. 15. Measure your level of hunger. The hungrier you are, the more fat you burn when you eat. 16. If you eat dessert more than 30 minutes after a meal, it turns to fat. 17. The calories in alcohol are used before stored fat calories. 18. It is ok to miss a meal if you are not hungry.

## 2021-01-08 NOTE — PROGRESS NOTES
Patient comes into the office today for a device check and ov w/NPFW. Interrogation of [facility] dual chamber pacemaker performed by [facility] rep. Tests w/in normal limits  0% AT/AF burden  0 ventricular episodes  No changes made  See [facility] report under the Cardiology tab.

## 2021-03-02 ENCOUNTER — TELEPHONE (OUTPATIENT)
Dept: CARDIOLOGY CLINIC | Age: 71
End: 2021-03-02

## 2021-03-03 ENCOUNTER — OFFICE VISIT (OUTPATIENT)
Dept: CARDIOLOGY CLINIC | Age: 71
End: 2021-03-03
Payer: MEDICARE

## 2021-03-03 VITALS
TEMPERATURE: 96.8 F | WEIGHT: 232 LBS | SYSTOLIC BLOOD PRESSURE: 130 MMHG | BODY MASS INDEX: 36.34 KG/M2 | DIASTOLIC BLOOD PRESSURE: 82 MMHG | HEART RATE: 60 BPM

## 2021-03-03 DIAGNOSIS — I42.8 NONISCHEMIC CARDIOMYOPATHY (HCC): Primary | ICD-10-CM

## 2021-03-03 DIAGNOSIS — I48.0 PAF (PAROXYSMAL ATRIAL FIBRILLATION) (HCC): ICD-10-CM

## 2021-03-03 DIAGNOSIS — I10 ESSENTIAL HYPERTENSION: ICD-10-CM

## 2021-03-03 DIAGNOSIS — Z95.0 PACEMAKER: ICD-10-CM

## 2021-03-03 PROCEDURE — 99214 OFFICE O/P EST MOD 30 MIN: CPT | Performed by: INTERNAL MEDICINE

## 2021-03-03 RX ORDER — LISINOPRIL 5 MG/1
5 TABLET ORAL DAILY
COMMUNITY
End: 2021-07-07 | Stop reason: ALTCHOICE

## 2021-03-03 RX ORDER — ATORVASTATIN CALCIUM 40 MG/1
40 TABLET, FILM COATED ORAL DAILY
COMMUNITY

## 2021-03-03 RX ORDER — HYDRALAZINE HYDROCHLORIDE 25 MG/1
25 TABLET, FILM COATED ORAL 2 TIMES DAILY
Qty: 180 TABLET | Refills: 3 | Status: SHIPPED | OUTPATIENT
Start: 2021-03-03 | End: 2022-04-04

## 2021-03-03 NOTE — PROGRESS NOTES
[facility]  Office Visit           Alka Jean MD,  [facility]                      Cardiology           Genevieve Smith  1950    March 3, 2021         HPI:  The patient is 66 y.o. female with is here is seen in the office today for follow-up. Had some vague chest discomfort last week and was admitted at [facility] for a period of about 3 days. Her work-up included a stress test that was negative. Echocardiograph was negative. CT was unremarkable. They reviewed that she had an angiogram in [facility] about 3 years ago and was clean coronary arteries. Blood pressure was elevated I did treat her with lisinopril at discharge along with Lipitor 40 mg. She has not taken those yet. We have not seen the ankle she will images of her cardiac cath. We will try to review those and at this time I see nothing that suggest that this is coronary artery disease. She does have atrial fibrillation and is on Eliquis. Examination today otherwise is unremarkable and there is no edema. [facility] normal  cors 10/2017 while visiting in [facility] neg stress test in 2018   Left heart cath June 28, 2019 normal coronaries  No SOB no wt gain no PND SHERRI   HTN SSS/ SJM dual chamber PPM    Chronic atrial fibrillation on Eliquis        Review of Systems:  Constitutional: Denies  fatigue, weakness, night sweats or fever. HEENT: Denies new visual changes, ringing in ears, nosebleeds,nasal congestion  Respiratory: Denies new or change in SOB, PND, orthopnea or cough. Cardiovascular: see HPI  GI: Denies N/V, diarrhea, constipation, abdominal pain, change in bowel habits, melena or hematochezia  : Denies urinary frequency, urgency, incontinence, hematuria or dysuria. Skin: Denies rash, hives, or cyanosis  Musculoskeletal: Denies joint or muscle aches/pain  Neurological: Denies syncope or TIA-like symptoms.   Psychiatric: Denies anxiety, insomnia or depression     Past Medical History: Last 3 Encounters:   03/03/21 60   01/07/21 60   12/02/20 60     Wt Readings from Last 3 Encounters:   03/03/21 232 lb (105.2 kg)   01/07/21 236 lb (107 kg)   12/02/20 234 lb 12.8 oz (106.5 kg)     Constitutional: She is oriented to person, place, and time. She appears well-developed and well-nourished. In no acute distress. HEENT: Normocephalic and atraumatic. Sclerae anicteric. No xanthelasmas. Conjunctiva white, no subconjunctival hemorrhage   External inspection of ears nose teeth & gums   Eyes:PERRLA EOM's intact. Neck: Neck supple. No JVD present. Carotids without bruits. No mass and no thyromegaly present. No lymphadenopathy present. Cardiovascular: RRR, normal S1 and S2; no murmur/gallop or rub, PMI nondisplaced  Pulmonary/Chest: Effort normal.  Lungs clear to auscultation. Chest wall nontender  Abdominal: soft, nontender, nondistended. + bowel sounds; no organomegaly or bruits. Aorta normal  Extremities: No edema, cyanosis, or clubbing. Pulses are 2+ radial/carotid/dorsalis pedis bilaterally. Cap refill brisk. Neurological: No cranial nerve deficit. Psychiatric: She has a normal mood and affect. Her speech is normal and behavior is normal.     Lab Review:   No results found for: TRIG, HDL, LDLCALC, LDLDIRECT, LABVLDL  Lab Results   Component Value Date     02/28/2020    K 4.9 02/28/2020     02/28/2020    CO2 26 02/28/2020    BUN 15 02/28/2020    CREATININE 0.6 02/28/2020    GLUCOSE 102 02/28/2020    CALCIUM 10.1 02/28/2020      Lab Results   Component Value Date    WBC 4.3 02/28/2020    HGB 11.8 (L) 02/28/2020    HCT 36.6 02/28/2020    MCV 87.4 02/28/2020     02/28/2020 2/25/21  Interpetation Summary \"Nuclear imaging results reported separately by  radiology department\":         o Negative ECG for ischemia with pharmocologic stress. IMPRESSION 2/26/21        No significant abnormality on chest CT    Procedure: 68826 Pharmacologic stress with Isotope. Study Protocol:  One day  Assessment:    Pacemaker leads seems to potentially have some decreased impedance. I think they are still working adequately. And she is not dependent on the pacemaker at this time. We will continue to monitor her. She has about 4 and half years of battery life. Regarding the burning in her left calf I think it may be some type of neuropathy of sorts. She does not have diabetes and her last labs done late April 2019 were unremarkable. EKG on 10/14/2020 done with magnet shows ventricular paced rhythm at 100/min. It is atrial sensed. On 10/14/2020 EKG without magnet shows sinus rhythm at 65/min. Normal study. [facility] normal  cors 10/2017   / neg stress test in 2018   Summary 9/26/18   Left ventricular cavity size is normal with mild concentric left ventricular   hypertrophy. Left ventricular function is mildly reduced with ejection fraction estimated   at 45-50%. Lateral wall and posterolateral wall appears hypokinetic. Diastolic filling parameters suggest grade I diastolic dysfunction. Aortic valve appears slightly thickened but opens adequately. Mild tricuspid regurgitation. Estimated pulmonary artery systolic pressure is at 37 mmHg assuming a right   atrial pressure of 3 mmHg. Summary 9/21/20   Left ventricular cavity size is normal with mild left ventricular   hypertrophy. Overall left ventricular systolic function appears mildly reduced with an   ejection fraction of 45-50%. There is hypokinesis of the lateral and posterolateral walls. Normal diastolic function. Estimated pulmonary artery systolic pressure is at 18 mmHg assuming a right   atrial pressure of 3 mmHg. HTN  optimal    SSS/ SJM dual chamber PPM    11/2018   SJM  Interrogation [facility] dual chamber ACCENT   assessed & reprogrammed   Battery life good  Noise noted on lead / assessed by rep   A paced/ 85%  V paced<1%   afib <1% / 22 sec afib but looks like noise     SHERRI   usues CPAP regularly       Plan:   We

## 2021-03-08 RX ORDER — ISOSORBIDE DINITRATE 10 MG/1
TABLET ORAL
Qty: 90 TABLET | Refills: 3 | Status: SHIPPED | OUTPATIENT
Start: 2021-03-08 | End: 2021-03-22

## 2021-03-22 RX ORDER — ISOSORBIDE DINITRATE 10 MG/1
TABLET ORAL
Qty: 90 TABLET | Refills: 3 | Status: SHIPPED | OUTPATIENT
Start: 2021-03-22 | End: 2021-11-30 | Stop reason: SDUPTHER

## 2021-04-06 ENCOUNTER — NURSE ONLY (OUTPATIENT)
Dept: CARDIOLOGY CLINIC | Age: 71
End: 2021-04-06
Payer: MEDICARE

## 2021-04-06 DIAGNOSIS — Z95.0 PACEMAKER: ICD-10-CM

## 2021-04-06 DIAGNOSIS — I49.5 SSS (SICK SINUS SYNDROME) (HCC): ICD-10-CM

## 2021-04-06 NOTE — LETTER
[address] [facility] [phone]  [address] [facility] [phone]    Pacemaker/Defibrillator Clinic          04/07/21        [address] [facility]        Dear Rosana Ramirez    This letter is to inform you that we received the transmission from your monitor at home that checks your implanted heart device. The next date your monitor will automatically transmit will be 10/5. If your report needs attention we will notify you. Your device and monitor are wireless and most transmit cellularly, but please periodically check your monitor is still plugged in to the electrical outlet. If you still use the telephone land line to send please ensure the connection to the phone adela is secure. This will help to ensure successful automatic transmissions in the future. Also, the monitor needs to be close to you while sleeping at night. Please be aware that the remote device transmission sites are periodically monitored only during regular business hours during which simultaneous in-office device clinics are being run. If your transmission requires attention, we will contact you as soon as possible. Thank you.             [address]

## 2021-04-07 PROCEDURE — 93296 REM INTERROG EVL PM/IDS: CPT | Performed by: INTERNAL MEDICINE

## 2021-04-07 PROCEDURE — 93294 REM INTERROG EVL PM/LDLS PM: CPT | Performed by: INTERNAL MEDICINE

## 2021-05-18 ENCOUNTER — TELEPHONE (OUTPATIENT)
Dept: CARDIOLOGY CLINIC | Age: 71
End: 2021-05-18

## 2021-05-18 NOTE — TELEPHONE ENCOUNTER
Patient would like a call at [phone]  Patients gastro doctor put her on xifaxan 550 mg. Before she take xifaxan she wants to make sure there are no side effects taking  Eliquis 5mg. And xifaxan 550mg.

## 2021-06-24 ENCOUNTER — TELEPHONE (OUTPATIENT)
Dept: CARDIOLOGY CLINIC | Age: 71
End: 2021-06-24

## 2021-06-24 NOTE — PROGRESS NOTES
tablet Take 40 mg by mouth daily      hydrALAZINE (APRESOLINE) 25 MG tablet Take 1 tablet by mouth 2 times daily 180 tablet 3    flecainide (TAMBOCOR) 50 MG tablet Take 1 tablet by mouth 2 times daily 180 tablet 3    methocarbamol (ROBAXIN) 750 MG tablet Take 750 mg by mouth daily      metoprolol succinate (TOPROL XL) 25 MG extended release tablet Take 1 tablet by mouth daily 90 tablet 1    Coenzyme Q10 50 MG CAPS Take 50 mg by mouth      calcium carbonate (OSCAL) 500 MG TABS tablet Take 500 mg by mouth daily      vitamin B-12 (CYANOCOBALAMIN) 100 MCG tablet Take 50 mcg by mouth daily      Multiple Vitamins-Minerals (THERAPEUTIC MULTIVITAMIN-MINERALS) tablet Take 1 tablet by mouth daily       No current facility-administered medications on file prior to visit. Past Medical History:   Diagnosis Date    Chronic kidney disease     Hypertension         Past Surgical History:   Procedure Laterality Date    CARDIAC PACEMAKER PLACEMENT      PACEMAKER PLACEMENT         Allergies   Allergen Reactions    Contrast [Iodides] Nausea And Vomiting    Morphine Nausea And Vomiting       Social History:  Reviewed. reports that she has never smoked. She has never used smokeless tobacco. She reports that she does not drink alcohol and does not use drugs. Family History:  Reviewed. family history includes Diabetes in her mother; Heart Disease in her mother; Heart Failure in her sister. Review of System:    · Constitutional: No fevers, chills. · Eyes: No visual changes or diplopia. No scleral icterus. · ENT: No Headaches. No mouth sores or sore throat. · Cardiovascular: No for chest pain, No for dyspnea on exertion, Yes for palpitations or No for loss of consciousness. No cough, hemoptysis, No for pleuritic pain, or phlebitis. · Respiratory: No for cough or wheezing. No hematemesis. · Gastrointestinal: No abdominal pain, blood in stools. · Genitourinary: No dysuria, or hematuria.   · Musculoskeletal: No gait disturbance,    · Integumentary: No rash or pruritis. · Neurological: No headache, change in muscle strength, numbness or tingling. · Psychiatric: No anxiety, or depression. · Endocrine: No temperature intolerance. No excessive thirst, fluid intake, or urination. · Hem/Lymph: No abnormal bruising or bleeding, blood clots or swollen lymph nodes. · Allergic/Immunologic: No nasal congestion or hives. Physical Examination:  Vitals:    07/07/21 1027   BP: 136/74   Pulse: 60         Wt Readings from Last 3 Encounters:   07/07/21 235 lb 3.2 oz (106.7 kg)   03/03/21 232 lb (105.2 kg)   01/07/21 236 lb (107 kg)       · Constitutional: Oriented. No distress. · Head: Normocephalic and atraumatic. · Mouth/Throat: Oropharynx is clear and moist.   · Eyes: Conjunctivae clear without jaunduice. PERRL. · Neck: Neck supple. No rigidity. No JVD present. · Cardiovascular: Normal rate, regular rhythm, S1&S2. · Pulmonary/Chest: Bilateral respiratory sounds. No wheezes, No rhonchi. · Abdominal: Soft. Bowel sounds present. No distension, No tenderness. · Musculoskeletal: No tenderness. No edema    · Lymphadenopathy: Has no cervical adenopathy. · Neurological: Alert and oriented. Cranial nerve appears intact, No Gross deficit   · Skin: Skin is warm and dry. No rash noted. · Psychiatric: Has a normal mood, affect and behavior     Labs:  Reviewed. No results for input(s): NA, K, CL, CO2, PHOS, BUN, CREATININE in the last 72 hours. Invalid input(s): CA,  TSH  No results for input(s): WBC, HGB, HCT, MCV, PLT in the last 72 hours.   No results found for: CKTOTAL, CKMB, CKMBINDEX, TROPONINI  No results found for: BNP  Lab Results   Component Value Date    PROTIME 13.0 02/28/2020    INR 1.12 02/28/2020     No results found for: CHOL, HDL, TRIG    ECG: Personally reviewed: AP, HR 60, , QRS 86, QTc 410    ECHO:  9.21.2020   Summary   Left ventricular cavity size is normal with mild left ventricular hypertrophy. Overall left ventricular systolic function appears mildly reduced with an   ejection fraction of 45-50%. There is hypokinesis of the lateral and posterolateral walls. Normal diastolic function. Estimated pulmonary artery systolic pressure is at 18 mmHg assuming a right   atrial pressure of 3 mmHg. Stress Test: 9/26/2018  No reversible ischemia    Cardiac Angiography: n/a    Problem List:   Patient Active Problem List    Diagnosis Date Noted    Pacemaker st Alex Puls 07/31/2014    SSS (sick sinus syndrome) 06/21/2012    HTN (hypertension) 06/21/2012    PAF (paroxysmal atrial fibrillation) ([address] 04/02/2020        Assessment:   1. Sick sinus syndrome ([address]    2. Pacemaker    3. Paroxysmal A-fib ([address]    4. Dilated cardiomyopathy ([address]    5. Benign essential HTN    6. Atypical chest pain         Cardiac HX: Asad Ramirez is a 66 y.o. woman with h/o HTN, HLD, SSS, SHERRI on CPAP, s/p dual chamber SJM PPM (Dr. Charles Avalos, 7/31/14), recent dx of AF on PPM, had been in [facility] [place], [place] visiting her sister, developed CP, went to ED, + MPI, s/p LHC (6/2019) showed nor cors. In f/u noted to have 45 min of AF on PPM device check, noise noted on device,  placed on Eliquis, s/p RA/RV lead revision (3/3/20, Dr. Nuvia Ramirez). [id number] 4. TSH 1.18 (1.5.2021).      SSS  - S/p dual chamber SJM PPM  - S/p RA/RV lead revision  - Device check today shows AP 97%,  <1%, 0% AT/AF burden, 8-8.8 years to TIM, all other parameters stable     pAF  - In NSR  - No AF seen on device  - On apixban 5 mg BID - no s/s bleeding - continue  - On flecainide 50 mg twice daily - QRS 86  - Toprol XL 25 mg daily  - SHERRI on CPAP, compliant  - ECG ordered and results personally reviewed     CMP  - EF 45-50%  - On hydralazine 25 mg twice daily, isosorbide 10 mg twice daily  - On Toprol XL 25 mg QD  - Repeat echo at next visit    HTN  - BP well controlled  - On hydralazine 25 mg twice a day  - Lifestyle modification -diet, exercise, weight loss     Atypical CP  - S/p MPI 2/2021, negative  - S/p LHC 2019 showed normal coronaries  - Will try and obtain cath report from [place], [place]  - Will order Protonix 40 mg QD x 2 weeks  - Follows w/ Dr. Breezy Koch, has appt at end of month     EF of 45-50%  Hydralazine, isosorbide for systolic HF  ASA and Statin for CAD  Anticoagulation for AF  No Tobacco use. F/u in 6 months with device check. All questions and concerns were addressed to the patient/family. Alternatives to my treatment were discussed. The note was completed using EMR. Every effort was made to ensure accuracy; however, inadvertent computerized transcription errors may be present. Patient received education regarding their diagnosis, treatment and medications while in the office today.       Zulma Ortiz [address]

## 2021-06-24 NOTE — TELEPHONE ENCOUNTER
[facility] Medication Refills:  apixaban (ELIQUIS) 5 MG TABS tablet  TAKE 1 TABLET BY MOUTH TWICE DAILY, Disp-60 tablet, R-3    [place] [address] [phone] - F [phone]    The patient is requesting a 90 day supply, so she won't have to go to the pharmacy every month          Last Office Visit: 03/03/21    Next Office Visit: 07/07/21    Last Refill: 04/15/21    Last Labs: 02/28/20

## 2021-07-07 ENCOUNTER — OFFICE VISIT (OUTPATIENT)
Dept: CARDIOLOGY CLINIC | Age: 71
End: 2021-07-07
Payer: MEDICARE

## 2021-07-07 ENCOUNTER — NURSE ONLY (OUTPATIENT)
Dept: CARDIOLOGY CLINIC | Age: 71
End: 2021-07-07

## 2021-07-07 VITALS
BODY MASS INDEX: 36.84 KG/M2 | WEIGHT: 235.2 LBS | HEART RATE: 60 BPM | DIASTOLIC BLOOD PRESSURE: 74 MMHG | SYSTOLIC BLOOD PRESSURE: 136 MMHG

## 2021-07-07 DIAGNOSIS — I49.5 SSS (SICK SINUS SYNDROME) (HCC): ICD-10-CM

## 2021-07-07 DIAGNOSIS — Z95.0 PACEMAKER: ICD-10-CM

## 2021-07-07 DIAGNOSIS — I48.0 PAROXYSMAL A-FIB (HCC): ICD-10-CM

## 2021-07-07 DIAGNOSIS — I10 BENIGN ESSENTIAL HTN: ICD-10-CM

## 2021-07-07 DIAGNOSIS — I48.0 PAF (PAROXYSMAL ATRIAL FIBRILLATION) (HCC): ICD-10-CM

## 2021-07-07 DIAGNOSIS — I49.5 SICK SINUS SYNDROME (HCC): Primary | ICD-10-CM

## 2021-07-07 DIAGNOSIS — I42.0 DILATED CARDIOMYOPATHY (HCC): ICD-10-CM

## 2021-07-07 DIAGNOSIS — R07.89 ATYPICAL CHEST PAIN: ICD-10-CM

## 2021-07-07 PROCEDURE — 99214 OFFICE O/P EST MOD 30 MIN: CPT | Performed by: NURSE PRACTITIONER

## 2021-07-07 RX ORDER — PANTOPRAZOLE SODIUM 40 MG/1
40 TABLET, DELAYED RELEASE ORAL DAILY
Qty: 30 TABLET | Refills: 0 | Status: SHIPPED | OUTPATIENT
Start: 2021-07-07

## 2021-07-07 NOTE — PROGRESS NOTES
Device check and ov w/NPFW. Normal device function. All testing appear wnl.  8-8.8 years to TIM  Presenting ApVs @ 70-75 bpm  Underlying AsVs @ 48 bpm  AP 97%  <1%  AT/AF burden 0%  0 episodes  0 changes  See Paceart report under the Cardiology tab. Follow up in 3 months via [address]

## 2021-07-22 ENCOUNTER — TELEPHONE (OUTPATIENT)
Dept: CARDIOLOGY CLINIC | Age: 71
End: 2021-07-22

## 2021-07-22 NOTE — TELEPHONE ENCOUNTER
[facility] [address] [facility] Genaro Hernandez. [address] [place] [address]. [facility] #  [phone]. Patient said OSMAR Hollins wanted this information from her last visit on 7-7-21 with Ervin.   Please call patient with any questions at [phone]

## 2021-07-27 ENCOUNTER — NURSE ONLY (OUTPATIENT)
Dept: CARDIOLOGY CLINIC | Age: 71
End: 2021-07-27

## 2021-07-27 DIAGNOSIS — I49.5 SSS (SICK SINUS SYNDROME) (HCC): ICD-10-CM

## 2021-07-27 DIAGNOSIS — Z95.0 PACEMAKER: ICD-10-CM

## 2021-07-27 DIAGNOSIS — I48.0 PAF (PAROXYSMAL ATRIAL FIBRILLATION) (HCC): ICD-10-CM

## 2021-07-27 PROCEDURE — 93280 PM DEVICE PROGR EVAL DUAL: CPT | Performed by: INTERNAL MEDICINE

## 2021-07-27 NOTE — LETTER
[address] [facility] [phone]  [address] [facility],[address] [phone]    Pacemaker/Defibrillator Clinic    07/28/21      [address] [facility]      Dear Yasir Brasher    This letter is to inform you that we received the transmission from your monitor at home that checks your implanted heart device. The next date your monitor will automatically transmit will be 10/26. If your report needs attention we will notify you. Your device and monitor are wireless and most transmit cellularly, but please periodically check your monitor is still plugged in to the electrical outlet. If you still use the telephone land line to send please ensure the connection to the phone adela is secure. This will help to ensure successful automatic transmissions in the future. Also, the monitor needs to be close to you while sleeping at night. Please be aware that the remote device transmission sites are periodically monitored only during regular business hours during which simultaneous in-office device clinics are being run. If your transmission requires attention, we will contact you as soon as possible. **PLEASE NOTE** that our [facility] policy and processes are changing to ensure a more seamless approach for all parties involved, allowing more time for our nurses to address patient issues and concerns. We will no longer be sending letters for NORMAL remote transmissions. You will be contacted by phone if your transmission requires attention (as previously done), and letters will only be sent regarding monitor disconnections or missed transmissions if you are unable to be reached by phone. Please do not be alarmed by this new process, as we will continue to contact you if your transmission report requires attention. This will be your final \"remote received\" letter.   From this point forward, the [facility] will be utilizing the no news is good news approach. As always, please feel free to contact your nurse with any questions or concerns. Thank you.     [facility]

## 2021-07-28 NOTE — PROGRESS NOTES
We received remote transmission from patient's dual chamber pacemaker monitor at home. Transmission shows normal sensing and pacing function. Slight decreasing trend noted in Rwaves; AutoSense On. No new arrhythmias/events recorded. EP physician will review. See interrogation under cardiology tab in the [address] [facility] [address] field for more details.

## 2021-07-29 ENCOUNTER — OFFICE VISIT (OUTPATIENT)
Dept: CARDIOLOGY CLINIC | Age: 71
End: 2021-07-29
Payer: MEDICARE

## 2021-07-29 VITALS
BODY MASS INDEX: 36.81 KG/M2 | HEART RATE: 72 BPM | DIASTOLIC BLOOD PRESSURE: 74 MMHG | WEIGHT: 235 LBS | SYSTOLIC BLOOD PRESSURE: 112 MMHG

## 2021-07-29 DIAGNOSIS — Z95.0 PACEMAKER: ICD-10-CM

## 2021-07-29 DIAGNOSIS — I48.0 PAROXYSMAL A-FIB (HCC): ICD-10-CM

## 2021-07-29 DIAGNOSIS — I49.5 SICK SINUS SYNDROME (HCC): Primary | ICD-10-CM

## 2021-07-29 PROCEDURE — 99214 OFFICE O/P EST MOD 30 MIN: CPT | Performed by: INTERNAL MEDICINE

## 2021-07-29 NOTE — PROGRESS NOTES
[facility]  Office Visit           Amilcar Milan MD,  [facility]                      Cardiology           [facility]  [birth year]    July 29, 2021         HPI:  The patient is 66 y.o. female with is here is seen in the office today for follow-up. H she is doing well from a cardiac perspective. Specifically no issues or concerns. Her blood pressure has been stable. She does have chronic atrial fibrillation on Eliquis. We had a discussion about the watchman device she seems to understand and will consider it. Also we did evaluate and follow-up on her pacemaker functionality. Has not received the coronavirus vaccine. \"No I will not take it\". [facility] normal  cors 10/2017 while visiting in [facility] neg stress test in 2018   Left heart cath June 28, 2019 normal coronaries  No SOB no wt gain no PND SHERRI   HTN SSS/ SJM dual chamber PPM    Chronic atrial fibrillation on Eliquis        Review of Systems:  Constitutional: Denies  fatigue, weakness, night sweats or fever. HEENT: Denies new visual changes, ringing in ears, nosebleeds,nasal congestion  Respiratory: Denies new or change in SOB, PND, orthopnea or cough. Cardiovascular: see HPI  GI: Denies N/V, diarrhea, constipation, abdominal pain, change in bowel habits, melena or hematochezia  : Denies urinary frequency, urgency, incontinence, hematuria or dysuria. Skin: Denies rash, hives, or cyanosis  Musculoskeletal: Denies joint or muscle aches/pain  Neurological: Denies syncope or TIA-like symptoms.   Psychiatric: Denies anxiety, insomnia or depression     Past Medical History:   Diagnosis Date    Chronic kidney disease     Hypertension      Past Surgical History:   Procedure Laterality Date    CARDIAC PACEMAKER PLACEMENT      PACEMAKER PLACEMENT       Family History   Problem Relation Age of Onset    Diabetes Mother     Heart Disease Mother     Heart Failure Sister      Social History     Tobacco Use    Smoking status: Never Smoker    Smokeless tobacco: Never Used   Vaping Use    Vaping Use: Never used   Substance Use Topics    Alcohol use: Never    Drug use: Never       Allergies   Allergen Reactions    Contrast [Iodides] Nausea And Vomiting    Morphine Nausea And Vomiting     Current Outpatient Medications   Medication Sig Dispense Refill    apixaban (ELIQUIS) 5 MG TABS tablet TAKE 1 TABLET BY MOUTH TWICE DAILY 60 tablet 3    isosorbide dinitrate (ISORDIL) 10 MG tablet TAKE ONE TABLET BY MOUTH TWICE A DAY 90 tablet 3    atorvastatin (LIPITOR) 40 MG tablet Take 40 mg by mouth daily      hydrALAZINE (APRESOLINE) 25 MG tablet Take 1 tablet by mouth 2 times daily 180 tablet 3    flecainide (TAMBOCOR) 50 MG tablet Take 1 tablet by mouth 2 times daily 180 tablet 3    methocarbamol (ROBAXIN) 750 MG tablet Take 750 mg by mouth daily      metoprolol succinate (TOPROL XL) 25 MG extended release tablet Take 1 tablet by mouth daily 90 tablet 1    Coenzyme Q10 50 MG CAPS Take 50 mg by mouth      calcium carbonate (OSCAL) 500 MG TABS tablet Take 500 mg by mouth daily      vitamin B-12 (CYANOCOBALAMIN) 100 MCG tablet Take 50 mcg by mouth daily      Multiple Vitamins-Minerals (THERAPEUTIC MULTIVITAMIN-MINERALS) tablet Take 1 tablet by mouth daily      pantoprazole (PROTONIX) 40 MG tablet Take 1 tablet by mouth daily (Patient not taking: Reported on 7/29/2021) 30 tablet 0     No current facility-administered medications for this visit. Physical Exam:   /74   Pulse 72   Wt 235 lb (106.6 kg)   BMI 36.81 kg/m²   BP Readings from Last 3 Encounters:   07/29/21 112/74   07/07/21 136/74   03/03/21 130/82     Pulse Readings from Last 3 Encounters:   07/29/21 72   07/07/21 60   03/03/21 60     Wt Readings from Last 3 Encounters:   07/29/21 235 lb (106.6 kg)   07/07/21 235 lb 3.2 oz (106.7 kg)   03/03/21 232 lb (105.2 kg)     Constitutional: She is oriented to person, place, and time.  She appears well-developed and well-nourished. In no acute distress. HEENT: Normocephalic and atraumatic. Sclerae anicteric. No xanthelasmas. Conjunctiva white, no subconjunctival hemorrhage   External inspection of ears nose teeth & gums   Eyes:PERRLA EOM's intact. Neck: Neck supple. No JVD present. Carotids without bruits. No mass and no thyromegaly present. No lymphadenopathy present. Cardiovascular: RRR, normal S1 and S2; no murmur/gallop or rub, PMI nondisplaced  Pulmonary/Chest: Effort normal.  Lungs clear to auscultation. Chest wall nontender  Abdominal: soft, nontender, nondistended. + bowel sounds; no organomegaly or bruits. Aorta normal  Extremities: No edema, cyanosis, or clubbing. Pulses are 2+ radial/carotid/dorsalis pedis bilaterally. Cap refill brisk. Neurological: No cranial nerve deficit. Psychiatric: She has a normal mood and affect. Her speech is normal and behavior is normal.     Lab Review:   No results found for: TRIG, HDL, LDLCALC, LDLDIRECT, LABVLDL  Lab Results   Component Value Date     02/28/2020    K 4.9 02/28/2020     02/28/2020    CO2 26 02/28/2020    BUN 15 02/28/2020    CREATININE 0.6 02/28/2020    GLUCOSE 102 02/28/2020    CALCIUM 10.1 02/28/2020      Lab Results   Component Value Date    WBC 4.3 02/28/2020    HGB 11.8 (L) 02/28/2020    HCT 36.6 02/28/2020    MCV 87.4 02/28/2020     02/28/2020 2/25/21  Interpetation Summary \"Nuclear imaging results reported separately by  radiology department\":         o Negative ECG for ischemia with pharmocologic stress. IMPRESSION 2/26/21        No significant abnormality on chest CT    Procedure: 87820 Pharmacologic stress with Isotope. Study Protocol: One day  Assessment:    Pacemaker leads seems to potentially have some decreased impedance. I think they are still working adequately. And she is not dependent on the pacemaker at this time. We will continue to monitor her. She has about 4 and half years of battery life.   Regarding the burning in her left calf I think it may be some type of neuropathy of sorts. She does not have diabetes and her last labs done late April 2019 were unremarkable. EKG on 10/14/2020 done with magnet shows ventricular paced rhythm at 100/min. It is atrial sensed. On 10/14/2020 EKG without magnet shows sinus rhythm at 65/min. Normal study. [facility] normal  cors 10/2017   / neg stress test in 2018   Summary 9/26/18   Left ventricular cavity size is normal with mild concentric left ventricular   hypertrophy. Left ventricular function is mildly reduced with ejection fraction estimated   at 45-50%. Lateral wall and posterolateral wall appears hypokinetic. Diastolic filling parameters suggest grade I diastolic dysfunction. Aortic valve appears slightly thickened but opens adequately. Mild tricuspid regurgitation. Estimated pulmonary artery systolic pressure is at 37 mmHg assuming a right   atrial pressure of 3 mmHg. Summary 9/21/20   Left ventricular cavity size is normal with mild left ventricular   hypertrophy. Overall left ventricular systolic function appears mildly reduced with an   ejection fraction of 45-50%. There is hypokinesis of the lateral and posterolateral walls. Normal diastolic function. Estimated pulmonary artery systolic pressure is at 18 mmHg assuming a right   atrial pressure of 3 mmHg. HTN  optimal    SSS/ SJM dual chamber PPM    11/2018   SJM  Interrogation [facility] dual chamber ACCENT   assessed & reprogrammed   Battery life good  Noise noted on lead / assessed by rep   A paced/ 85%  V paced<1%   afib <1% / 22 sec afib but looks like noise     SHERRI   usues CPAP regularly       Plan:  Continue current therapy. I will advise her to get the coronavirus vaccine. We will change her medicine so she can get a 90-day supply. Return to see me in 6 months. ..   Jennifer Dominguez MD, [facility]

## 2021-10-26 ENCOUNTER — NURSE ONLY (OUTPATIENT)
Dept: CARDIOLOGY CLINIC | Age: 71
End: 2021-10-26
Payer: MEDICARE

## 2021-10-26 DIAGNOSIS — I48.0 PAF (PAROXYSMAL ATRIAL FIBRILLATION) (HCC): ICD-10-CM

## 2021-10-26 DIAGNOSIS — Z95.0 PACEMAKER: ICD-10-CM

## 2021-10-26 DIAGNOSIS — I49.5 SSS (SICK SINUS SYNDROME) (HCC): ICD-10-CM

## 2021-10-27 PROCEDURE — 93294 REM INTERROG EVL PM/LDLS PM: CPT | Performed by: INTERNAL MEDICINE

## 2021-10-27 PROCEDURE — 93296 REM INTERROG EVL PM/IDS: CPT | Performed by: INTERNAL MEDICINE

## 2021-11-30 RX ORDER — ISOSORBIDE DINITRATE 10 MG/1
10 TABLET ORAL 2 TIMES DAILY
Qty: 180 TABLET | Refills: 1 | Status: SHIPPED | OUTPATIENT
Start: 2021-11-30 | End: 2022-09-07 | Stop reason: SDUPTHER

## 2021-12-06 ENCOUNTER — TELEPHONE (OUTPATIENT)
Dept: CARDIOLOGY CLINIC | Age: 71
End: 2021-12-06

## 2021-12-07 RX ORDER — METOPROLOL SUCCINATE 25 MG/1
25 TABLET, EXTENDED RELEASE ORAL DAILY
Qty: 90 TABLET | Refills: 1 | Status: SHIPPED | OUTPATIENT
Start: 2021-12-07 | End: 2022-06-06 | Stop reason: SDUPTHER

## 2022-01-06 NOTE — PROGRESS NOTES
[facility]   Cardiac Electrophysiology Consultation   Date: 1/6/2022  Reason for Consultation: Atrial Fibrillation  Consult Requesting Physician: No att. providers found     Assessment/Plan:  1. PAF, on Eliquis and flecainide  2. SSS s/p dual chamber PPM   3. HTN, stable on Toprol, hydralazine, and isosorbide  4. SHERRI on CPAP  5. NICM, EF 45-50%    Recommendations:  1. Patient is doing reasonably well with no complaints  2. Device interrogation within normal limits  3. Continue Eliquis 5 mg BID, flecainide 50 mg BID, Toprol 25 mg daily, hydralazine/isosorbide  4. Transthoracic echocardiogram during the next office visit    Follow up in 6 months with EP NP     Chief Complaint:   Chief Complaint   Patient presents with    6 Month Follow-Up      HPI: Michael Morris is a 66 y.o. referred by the device clinic for new onset AF. PMH significant for HTN, HLD, and SSS s/p SJM dual chamber PPM originally in 2005 with generator change on 7/31/14 (Dr. Tacho Jack). Prior interrogations have shown stable atrial lead impedence; however, the interrogation in 6/2019 showed clear AF with longest episode being almost 2 hrs. Of note, pt was visiting her sister in [address] and had c/o CP. Following a positive stress test, she underwent a LHC (in [address]) that showed normal coronaries. A CXR was done due to concern for noise of the RA and RV leads. Device tracings and interrogation data were very consistent with insulation break - a common finding with tendril lead. S/p RA/RV lead revision on 3/3/20. Interval History: Today, she is in follow up after the lead revision. Patient denies any symptoms of palpitations, shortness of breath, lightheadedness or syncopal episodes. Device interrogation:  All sensing and pacing parameters are within normal range. Battery life 6.3-6.9 years  AP 97%.  <1%.   Presenting ApVs @ 60-65 bpm  Underlying AsVs @ 40-45 bpm  1 PMT episode on 11.25.2021 that appears PACs and brief run of pST/AT      Atrial Fibrillation:    BMI    :   Body mass index is 37.15 kg/m². Duration   :   6/2019    Symptoms   :   Denies any specific symptoms    Previous DCCV :   none    Previous AAD           :    none    Beta blocker  :   Toprol    ACE / ARB  :   none    OAC   :   Eliquis    LVEF    :   45-50%    Left atrial size :   3.9 cm    SHERRI   :   Yes, on CPAP    Past Medical History:   Diagnosis Date    Chronic kidney disease     Hypertension         Past Surgical History:   Procedure Laterality Date    CARDIAC PACEMAKER PLACEMENT      PACEMAKER PLACEMENT         Allergies: Allergies   Allergen Reactions    Contrast [Iodides] Nausea And Vomiting    Morphine Nausea And Vomiting       Medication:   Prior to Admission medications    Medication Sig Start Date End Date Taking?  Authorizing Provider   metoprolol succinate (TOPROL XL) 25 MG extended release tablet Take 1 tablet by mouth daily 12/7/21   MARIA ELENA Napier CNP   isosorbide dinitrate (ISORDIL) 10 MG tablet Take 1 tablet by mouth 2 times daily 11/30/21   Time MARIA ELENA Michelle CNP   apixaban (ELIQUIS) 5 MG TABS tablet TAKE 1 TABLET BY MOUTH TWICE DAILY 8/23/21   Time MARIA ELENA Michelle CNP   pantoprazole (PROTONIX) 40 MG tablet Take 1 tablet by mouth daily  Patient not taking: Reported on 7/29/2021 7/7/21   MARIA ELENA Napier CNP   atorvastatin (LIPITOR) 40 MG tablet Take 40 mg by mouth daily    Historical Provider, MD   hydrALAZINE (APRESOLINE) 25 MG tablet Take 1 tablet by mouth 2 times daily 3/3/21   Giovanni Martel MD   flecainide (TAMBOCOR) 50 MG tablet Take 1 tablet by mouth 2 times daily 1/7/21   MARIA ELENA Napier CNP   methocarbamol (ROBAXIN) 750 MG tablet Take 750 mg by mouth daily 10/2/20   Historical Provider, MD   Coenzyme Q10 50 MG CAPS Take 50 mg by mouth    Historical Provider, MD   calcium carbonate (OSCAL) 500 MG TABS tablet Take 500 mg by mouth daily    Historical Provider, MD   vitamin B-12 (CYANOCOBALAMIN) 100 MCG tablet Take 50 mcg by mouth daily    Historical Provider, MD   Multiple Vitamins-Minerals (THERAPEUTIC MULTIVITAMIN-MINERALS) tablet Take 1 tablet by mouth daily    Historical Provider, MD       Social History:   reports that she has never smoked. She has never used smokeless tobacco. She reports that she does not drink alcohol and does not use drugs. Family History:  family history includes Diabetes in her mother; Heart Disease in her mother; Heart Failure in her sister. Reviewed. Denies family history of sudden cardiac death, arrhythmia, premature CAD    Review of System:    · General ROS: negative for - chills, fever   · Psychological ROS: negative for - anxiety or depression  · Ophthalmic ROS: negative for - eye pain or loss of vision  · ENT ROS: negative for - epistaxis, headaches, nasal discharge, sore throat   · Allergy and Immunology ROS: negative for - hives, nasal congestion   · Hematological and Lymphatic ROS: negative for - bleeding problems, blood clots, bruising or jaundice  · Endocrine ROS: negative for - skin changes, temperature intolerance or unexpected weight changes  · Respiratory ROS: negative for - cough, hemoptysis, pleuritic pain, SOB, sputum changes or wheezing  · Cardiovascular ROS: Per HPI. · Gastrointestinal ROS: negative for - abdominal pain, blood in stools, diarrhea, hematemesis, melena, nausea/vomiting or swallowing difficulty/pain  · Genito-Urinary ROS: negative for - dysuria or incontinence  · Musculoskeletal ROS: negative for - joint swelling or muscle pain  · Neurological ROS: negative for - confusion, dizziness, gait disturbance, headaches, numbness/tingling, seizures, speech problems, tremors, visual changes or weakness  · Dermatological ROS: negative for - rash    Physical Examination:  Vitals:    01/11/22 1014   BP: 130/80   Pulse: 60       · Constitutional: Oriented. No distress. · Head: Normocephalic and atraumatic.    · Mouth/Throat: Oropharynx is clear and moist.   · Eyes: Conjunctivae normal. EOM are normal.   · Neck: Normal range of motion. Neck supple. No rigidity. No JVD present. · Cardiovascular: Normal rate, regular rhythm, S1&S2 and intact distal pulses. · Pulmonary/Chest: Bilateral respiratory sounds. No wheezes. No rhonchi. · Abdominal: Soft. Bowel sounds present. No distension, No tenderness. · Musculoskeletal: No tenderness. No edema    · Lymphadenopathy: Has no cervical adenopathy. · Neurological: Alert and oriented. Cranial nerve appears intact, No Gross deficit   · Skin: Skin is warm and dry. No rash noted. · Psychiatric: Has a normal mood, affect and behavior     Labs:  Reviewed. ECG: reviewed, AP, with QRS duration 82 ms. No pathologic Q waves, ventricular pre-excitation, or QT prolongation. Studies:   1. Event monitor:  none    2. Echo 9/21/20  Summary   Left ventricular cavity size is normal with mild left ventricular   hypertrophy. Overall left ventricular systolic function appears mildly reduced with an ejection fraction of 45-50%. There is hypokinesis of the lateral and posterolateral walls. Normal diastolic function. Estimated pulmonary artery systolic pressure is at 18 mmHg assuming a right atrial pressure of 3 mmHg. Echo 9/26/18:  EF 45-50%.     3. Stress Test 6/27/19 (. [place], [place]):    Positive (report not available)    4. Cath 6/28/19 ([place], [place]):   Normal coronaries (see complete report under Media)    The MCOT, echocardiogram, stress test, and coronary angiography/PCI were reviewed by myself and used for my plan of care. EF 45-50% (9/2018)  PAF seen on device, longest episode lasting 45 min.   Currently on Eliquis 5 mg BID for stroke prevention  She have atrial fibrillation, 1% burden  In the past, her atrial lead was not functioning well and hence it is possible that she had intermittent atrial fibrillation in the past also  No obstructive coronary artery disease  It is possible that patient have nonischemic cardiomyopathy, secondary to asymptomatic atrial fibrillation    If she continues to have atrial fibrillation in spite of being on flecainide, then we will consider aggressive rhythm control strategy. Thank you for allowing me to participate in the care of [phone]. All questions and concerns were addressed to the patient/family. Alternatives to my treatment were discussed.      Katie Pete MD  Cardiac Electrophysiology  [facility]

## 2022-01-11 ENCOUNTER — NURSE ONLY (OUTPATIENT)
Dept: CARDIOLOGY CLINIC | Age: 72
End: 2022-01-11
Payer: MEDICARE

## 2022-01-11 ENCOUNTER — OFFICE VISIT (OUTPATIENT)
Dept: CARDIOLOGY CLINIC | Age: 72
End: 2022-01-11
Payer: MEDICARE

## 2022-01-11 VITALS
HEART RATE: 60 BPM | DIASTOLIC BLOOD PRESSURE: 80 MMHG | WEIGHT: 237.2 LBS | HEIGHT: 67 IN | BODY MASS INDEX: 37.23 KG/M2 | SYSTOLIC BLOOD PRESSURE: 130 MMHG

## 2022-01-11 DIAGNOSIS — I10 BENIGN ESSENTIAL HTN: ICD-10-CM

## 2022-01-11 DIAGNOSIS — I48.0 PAF (PAROXYSMAL ATRIAL FIBRILLATION) (HCC): Primary | ICD-10-CM

## 2022-01-11 DIAGNOSIS — Z95.0 PACEMAKER: ICD-10-CM

## 2022-01-11 DIAGNOSIS — I42.0 DILATED CARDIOMYOPATHY (HCC): ICD-10-CM

## 2022-01-11 DIAGNOSIS — I49.5 SSS (SICK SINUS SYNDROME) (HCC): ICD-10-CM

## 2022-01-11 DIAGNOSIS — I48.0 PAF (PAROXYSMAL ATRIAL FIBRILLATION) (HCC): ICD-10-CM

## 2022-01-11 DIAGNOSIS — G47.33 OSA ON CPAP: ICD-10-CM

## 2022-01-11 DIAGNOSIS — Z99.89 OSA ON CPAP: ICD-10-CM

## 2022-01-11 PROCEDURE — 99214 OFFICE O/P EST MOD 30 MIN: CPT | Performed by: INTERNAL MEDICINE

## 2022-01-11 PROCEDURE — 93000 ELECTROCARDIOGRAM COMPLETE: CPT | Performed by: INTERNAL MEDICINE

## 2022-01-11 PROCEDURE — 93280 PM DEVICE PROGR EVAL DUAL: CPT | Performed by: INTERNAL MEDICINE

## 2022-01-11 NOTE — PROGRESS NOTES
Patient comes in for programming evaluation on their [facility] dual chamber pacemaker. (Lead revision 3.3.2020 to MDT leads)  Last remote 10.26.2021     All sensing and pacing parameters are within normal range. Battery life 6.3-6.9 years  AP 97%.  <1%. Presenting ApVs @ 60-65 bpm  Underlying AsVs @ 40-45 bpm  1 PMT episode on 11.25.2021 that appears PACs and brief run of pST/AT. Patient remains on eliquis, flecainide, metoprolol. No changes need to be made at this time. Please see interrogation for more detail. Discussed home monitor- (monitor not connecting-last connection 12.16.21//01.11//kj)    [facility] [place], [place]          [phone]              Patient will see Dr. Fazal Espinosa today and follow up in 3 months in office or remotely.

## 2022-02-02 ENCOUNTER — OFFICE VISIT (OUTPATIENT)
Dept: CARDIOLOGY CLINIC | Age: 72
End: 2022-02-02
Payer: MEDICARE

## 2022-02-02 VITALS
DIASTOLIC BLOOD PRESSURE: 74 MMHG | BODY MASS INDEX: 37.12 KG/M2 | HEART RATE: 80 BPM | SYSTOLIC BLOOD PRESSURE: 122 MMHG | WEIGHT: 237 LBS

## 2022-02-02 DIAGNOSIS — Z95.0 PACEMAKER: ICD-10-CM

## 2022-02-02 DIAGNOSIS — I48.0 PAROXYSMAL A-FIB (HCC): ICD-10-CM

## 2022-02-02 DIAGNOSIS — I48.0 PAF (PAROXYSMAL ATRIAL FIBRILLATION) (HCC): Primary | ICD-10-CM

## 2022-02-02 DIAGNOSIS — I42.8 NONISCHEMIC CARDIOMYOPATHY (HCC): ICD-10-CM

## 2022-02-02 PROCEDURE — 99214 OFFICE O/P EST MOD 30 MIN: CPT | Performed by: INTERNAL MEDICINE

## 2022-02-02 NOTE — PROGRESS NOTES
[facility]  [facility] Visit           Alejandro Valentin MD,  [facility]                      Cardiology           Estrella Rolon  1950 February 2, 2022         HPI:  The patient is 67 y.o. female with is here is seen in the office today for follow-up. She is here today and doing very well. Chronic atrial fibrillation. Has a defibrillator device on board and is clinically stable. Did have coronavirus and [place] time of last year December 2021. Still will not accept the vaccination. All else looks stable. Her device was recently implanted and recently interrogated and has good life with. Had virus infection and survived    Has not received the coronavirus vaccine. \"No I will not take it\". [facility] normal  cors 10/2017 while visiting in [facility] neg stress test in 2018   Left heart cath June 28, 2019 normal coronaries  No SOB no wt gain no PND SHERRI   HTN SSS/ SJM dual chamber PPM    Chronic atrial fibrillation on Eliquis        Review of Systems:  Constitutional: Denies  fatigue, weakness, night sweats or fever. HEENT: Denies new visual changes, ringing in ears, nosebleeds,nasal congestion  Respiratory: Denies new or change in SOB, PND, orthopnea or cough. Cardiovascular: see HPI  GI: Denies N/V, diarrhea, constipation, abdominal pain, change in bowel habits, melena or hematochezia  : Denies urinary frequency, urgency, incontinence, hematuria or dysuria. Skin: Denies rash, hives, or cyanosis  Musculoskeletal: Denies joint or muscle aches/pain  Neurological: Denies syncope or TIA-like symptoms.   Psychiatric: Denies anxiety, insomnia or depression     Past Medical History:   Diagnosis Date    Chronic kidney disease     Hypertension      Past Surgical History:   Procedure Laterality Date    CARDIAC PACEMAKER PLACEMENT      PACEMAKER PLACEMENT       Family History   Problem Relation Age of Onset    Diabetes Mother     Heart Disease Mother     Heart Failure Sister Social History     Tobacco Use    Smoking status: Never Smoker    Smokeless tobacco: Never Used   Vaping Use    Vaping Use: Never used   Substance Use Topics    Alcohol use: Never    Drug use: Never       Allergies   Allergen Reactions    Contrast [Iodides] Nausea And Vomiting    Morphine Nausea And Vomiting     Current Outpatient Medications   Medication Sig Dispense Refill    metoprolol succinate (TOPROL XL) 25 MG extended release tablet Take 1 tablet by mouth daily 90 tablet 1    isosorbide dinitrate (ISORDIL) 10 MG tablet Take 1 tablet by mouth 2 times daily 180 tablet 1    apixaban (ELIQUIS) 5 MG TABS tablet TAKE 1 TABLET BY MOUTH TWICE DAILY 180 tablet 3    pantoprazole (PROTONIX) 40 MG tablet Take 1 tablet by mouth daily 30 tablet 0    atorvastatin (LIPITOR) 40 MG tablet Take 40 mg by mouth daily      hydrALAZINE (APRESOLINE) 25 MG tablet Take 1 tablet by mouth 2 times daily 180 tablet 3    flecainide (TAMBOCOR) 50 MG tablet Take 1 tablet by mouth 2 times daily 180 tablet 3    methocarbamol (ROBAXIN) 750 MG tablet Take 750 mg by mouth daily      Coenzyme Q10 50 MG CAPS Take 50 mg by mouth      calcium carbonate (OSCAL) 500 MG TABS tablet Take 500 mg by mouth daily      vitamin B-12 (CYANOCOBALAMIN) 100 MCG tablet Take 50 mcg by mouth daily      Multiple Vitamins-Minerals (THERAPEUTIC MULTIVITAMIN-MINERALS) tablet Take 1 tablet by mouth daily       No current facility-administered medications for this visit. Physical Exam:   /74   Pulse 80   Wt 237 lb (107.5 kg)   BMI 37.12 kg/m²   BP Readings from Last 3 Encounters:   02/02/22 122/74   01/11/22 130/80   07/29/21 112/74     Pulse Readings from Last 3 Encounters:   02/02/22 80   01/11/22 60   07/29/21 72     Wt Readings from Last 3 Encounters:   02/02/22 237 lb (107.5 kg)   01/11/22 237 lb 3.2 oz (107.6 kg)   07/29/21 235 lb (106.6 kg)     Constitutional: She is oriented to person, place, and time.  She appears Regarding the burning in her left calf I think it may be some type of neuropathy of sorts. She does not have diabetes and her last labs done late April 2019 were unremarkable. EKG on 10/14/2020 done with magnet shows ventricular paced rhythm at 100/min. It is atrial sensed. On 10/14/2020 EKG without magnet shows sinus rhythm at 65/min. Normal study. [facility] normal  cors 10/2017   / neg stress test in 2018   Summary 9/26/18   Left ventricular cavity size is normal with mild concentric left ventricular   hypertrophy. Left ventricular function is mildly reduced with ejection fraction estimated   at 45-50%. Lateral wall and posterolateral wall appears hypokinetic. Diastolic filling parameters suggest grade I diastolic dysfunction. Aortic valve appears slightly thickened but opens adequately. Mild tricuspid regurgitation. Estimated pulmonary artery systolic pressure is at 37 mmHg assuming a right   atrial pressure of 3 mmHg. Summary 9/21/20   Left ventricular cavity size is normal with mild left ventricular   hypertrophy. Overall left ventricular systolic function appears mildly reduced with an   ejection fraction of 45-50%. There is hypokinesis of the lateral and posterolateral walls. Normal diastolic function. Estimated pulmonary artery systolic pressure is at 18 mmHg assuming a right   atrial pressure of 3 mmHg. HTN  optimal   SSS/ SJM dual chamber PPM    11/2018   SJM  Interrogation [facility] dual chamber ACCENT   assessed & reprogrammed   Battery life good  Noise noted on lead / assessed by rep   A paced/ 85%  V paced<1%   afib <1% / 22 sec afib but looks like noise     SHERRI   usues CPAP regularly       Plan: We will continue her current therapy. She still getting regular interrogations of her device. Return to see me and 6 months.   Maddi Caballero MD, [facility]

## 2022-02-04 RX ORDER — FLECAINIDE ACETATE 50 MG/1
50 TABLET ORAL 2 TIMES DAILY
Qty: 180 TABLET | Refills: 3 | Status: SHIPPED | OUTPATIENT
Start: 2022-02-04

## 2022-02-04 NOTE — TELEPHONE ENCOUNTER
Requested Prescriptions     Pending Prescriptions Disp Refills    flecainide (TAMBOCOR) 50 MG tablet 180 tablet 3     Sig: Take 1 tablet by mouth 2 times daily           Number: 180    Refills: 3    Last Office Visit: 1/11/2022     Next Office Visit: 2/22/2022

## 2022-03-14 ENCOUNTER — TELEPHONE (OUTPATIENT)
Dept: CARDIOLOGY CLINIC | Age: 72
End: 2022-03-14

## 2022-03-14 NOTE — TELEPHONE ENCOUNTER
Patient called requesting information for assistance with Eliquis  please call pt with additional information please advise pt has samples upfront 3/14/22

## 2022-03-15 ENCOUNTER — TELEPHONE (OUTPATIENT)
Dept: CARDIOLOGY CLINIC | Age: 72
End: 2022-03-15

## 2022-03-15 DIAGNOSIS — I49.5 SSS (SICK SINUS SYNDROME) (HCC): ICD-10-CM

## 2022-03-15 DIAGNOSIS — I48.0 PAF (PAROXYSMAL ATRIAL FIBRILLATION) (HCC): Primary | ICD-10-CM

## 2022-03-15 NOTE — TELEPHONE ENCOUNTER
Patient stopped in stating FW sent a refill for Eliquis 5mg to [place] and patient would now like a new prescription sent to [facility] instead. Patient last OV was 2.2.22 next OV 7.18.22 labs done 2.28.20.  Please reach patient on home number or [phone]

## 2022-03-25 NOTE — TELEPHONE ENCOUNTER
Patient asking that [employer] help with a form to get eliquis at a lower cost.  Please call pt at [phone]  Pt said she can't get messages [phone]  Pt also asking for samples of eliquis

## 2022-04-04 RX ORDER — HYDRALAZINE HYDROCHLORIDE 25 MG/1
TABLET, FILM COATED ORAL
Qty: 180 TABLET | Refills: 3 | Status: SHIPPED | OUTPATIENT
Start: 2022-04-04

## 2022-04-08 ENCOUNTER — TELEPHONE (OUTPATIENT)
Dept: CARDIOLOGY CLINIC | Age: 72
End: 2022-04-08

## 2022-04-08 NOTE — TELEPHONE ENCOUNTER
Pt's  stop in office to drop off Eliquis 360 form. It needs signed by provider. Its in  Box. I gave  a pt assistance form if the outcome of 360 isnt what they hope. I told him we would call with decision after the 360 form was sent      Please advise .

## 2022-04-18 ENCOUNTER — NURSE ONLY (OUTPATIENT)
Dept: CARDIOLOGY CLINIC | Age: 72
End: 2022-04-18
Payer: MEDICARE

## 2022-04-18 DIAGNOSIS — I49.5 SSS (SICK SINUS SYNDROME) (HCC): ICD-10-CM

## 2022-04-18 DIAGNOSIS — Z95.0 PACEMAKER: ICD-10-CM

## 2022-04-18 NOTE — PROGRESS NOTES
We received remote transmission from patient's dual chamber pacemaker monitor at home. Transmission shows normal sensing and pacing function. Slight decreasing trend noted in Rwaves; AutoSense On. No new arrhythmias/events recorded. EP physician will review. See interrogation under cardiology tab in the [facility] [address] field for more details.

## 2022-04-19 PROCEDURE — 93296 REM INTERROG EVL PM/IDS: CPT | Performed by: INTERNAL MEDICINE

## 2022-04-19 PROCEDURE — 93294 REM INTERROG EVL PM/LDLS PM: CPT | Performed by: INTERNAL MEDICINE

## 2022-04-22 NOTE — TELEPHONE ENCOUNTER
Pt is calling about to get an update on the application and pt would like some samples to  on monday

## 2022-04-25 DIAGNOSIS — I48.0 PAF (PAROXYSMAL ATRIAL FIBRILLATION) (HCC): ICD-10-CM

## 2022-04-25 DIAGNOSIS — I49.5 SSS (SICK SINUS SYNDROME) (HCC): ICD-10-CM

## 2022-04-26 ENCOUNTER — TELEPHONE (OUTPATIENT)
Dept: CARDIOLOGY CLINIC | Age: 72
End: 2022-04-26

## 2022-04-26 DIAGNOSIS — I48.0 PAF (PAROXYSMAL ATRIAL FIBRILLATION) (HCC): ICD-10-CM

## 2022-04-26 DIAGNOSIS — I49.5 SSS (SICK SINUS SYNDROME) (HCC): ICD-10-CM

## 2022-04-26 NOTE — TELEPHONE ENCOUNTER
Patient stopped wanting samples of Eliquis 5mg. Patient was given a 2 week supply.  Patient called back wanting a prescription for Eliquis 5mg sent to [facility] on [address] Last OV 2.2.22 next OV 7.18.22

## 2022-05-04 ENCOUNTER — TELEPHONE (OUTPATIENT)
Dept: CARDIOLOGY CLINIC | Age: 72
End: 2022-05-04

## 2022-05-06 ENCOUNTER — OFFICE VISIT (OUTPATIENT)
Dept: CARDIOLOGY CLINIC | Age: 72
End: 2022-05-06
Payer: MEDICARE

## 2022-05-06 VITALS
HEIGHT: 67 IN | WEIGHT: 232 LBS | HEART RATE: 60 BPM | BODY MASS INDEX: 36.41 KG/M2 | SYSTOLIC BLOOD PRESSURE: 120 MMHG | DIASTOLIC BLOOD PRESSURE: 74 MMHG

## 2022-05-06 DIAGNOSIS — R06.02 SOB (SHORTNESS OF BREATH): ICD-10-CM

## 2022-05-06 DIAGNOSIS — R07.9 CHEST PAIN, UNSPECIFIED TYPE: Primary | ICD-10-CM

## 2022-05-06 PROCEDURE — 93000 ELECTROCARDIOGRAM COMPLETE: CPT | Performed by: NURSE PRACTITIONER

## 2022-05-06 PROCEDURE — 99214 OFFICE O/P EST MOD 30 MIN: CPT | Performed by: NURSE PRACTITIONER

## 2022-05-06 NOTE — PATIENT INSTRUCTIONS
1. Lexiscan  2. TTE  3. Blood work  4. PFT   5. Fu in 2-3 weeks   6.  Next visit with Dr. Rianna Archuleta

## 2022-05-06 NOTE — PROGRESS NOTES
[address]     Outpatient Follow Up Note  Dr Mali Palomino MD,  Ramez January RN, APRN,CNP CVNP      CHIEF COMPLAINT / HPI: Shama Rivera is 67 y.o. female who presents today with a history of  HC normal  cors 10/2017 while visiting in [facility] neg stress test in 2018   Left heart cath June 28, 2019 normal coronaries  HTN wnl   SSS/ SJM dual chamber PPM    PAF  on Eliquis   Refuses Covid vaccines  / states had Covid   SHERRI uses CPAP    Interval history: Ms Shaquille Day said she is to have one tooth extracted and she wanted to be sedation and not to be conscious, she said her b/p ws too high and had SOB and she refused to have tooth extracted with novocain. she c/o fatigued and not active   EKG today WNL    Today No c/o cp/SOB edema today / VSS      9/2020 TTE EF 45-50%  Left ventricular cavity size is normal with mild left ventricular hypertrophy. Overall left ventricular systolic function appears mildly reduced with an ejection fraction of 45-50%. There is hypokinesis of the lateral and posterolateral walls. Normal diastolic function. Estimated pulmonary artery systolic pressure is at 18 mmHg assuming a right atrial pressure of 3 mmHg. I spent a total of 35 minutes and greater than 50% of the time was spent counseling with patient  coordinating care regarding her diagnosis, reviewing labs, cardiac work up, treatments and plan of care.     Past Medical History:   Diagnosis Date    Chronic kidney disease     Hypertension      Social History:    Social History     Tobacco Use   Smoking Status Never Smoker   Smokeless Tobacco Never Used     Current Medications:  Current Outpatient Medications   Medication Sig Dispense Refill    apixaban (ELIQUIS) 5 MG TABS tablet TAKE 1 TABLET BY MOUTH TWICE DAILY 180 tablet 3    hydrALAZINE (APRESOLINE) 25 MG tablet TAKE ONE TABLET BY MOUTH TWICE A  tablet 3    flecainide (TAMBOCOR) 50 MG tablet Take 1 tablet by mouth 2 times daily 180 tablet 3    metoprolol succinate (TOPROL XL) 25 MG extended release tablet Take 1 tablet by mouth daily 90 tablet 1    isosorbide dinitrate (ISORDIL) 10 MG tablet Take 1 tablet by mouth 2 times daily 180 tablet 1    pantoprazole (PROTONIX) 40 MG tablet Take 1 tablet by mouth daily 30 tablet 0    atorvastatin (LIPITOR) 40 MG tablet Take 40 mg by mouth daily      methocarbamol (ROBAXIN) 750 MG tablet Take 750 mg by mouth daily      Coenzyme Q10 50 MG CAPS Take 50 mg by mouth      calcium carbonate (OSCAL) 500 MG TABS tablet Take 500 mg by mouth daily      vitamin B-12 (CYANOCOBALAMIN) 100 MCG tablet Take 50 mcg by mouth daily      Multiple Vitamins-Minerals (THERAPEUTIC MULTIVITAMIN-MINERALS) tablet Take 1 tablet by mouth daily       No current facility-administered medications for this visit. REVIEW OF SYSTEMS:    CONSTITUTIONAL: No major weight gain or loss, night sweats, fever, fatigue, or weakness. HEENT: No new vision difficulties or ringing in the ears. RESPIRATORY: No new SOB, PND, orthopnea or cough. CARDIOVASCULAR: See HPI  GI: No N/V/D, constipation, or abdominal pain. No black/tarry stools  : No urinary urgency, incontinence, or hematuria. SKIN: No cyanosis or skin lesions. MUSCULOSKELETAL: No new muscle or joint pain. NEUROLOGICAL: No syncope or TIA-like symptoms. PSYCHIATRIC: No anxiety, pain, insomnia or depression        Vitals:    05/06/22 1122   BP: 120/74   Pulse: 60   Weight: 232 lb (105.2 kg)   Height: 5' 7\" (1.702 m)      VITALS:  /74   Pulse 60   Ht 5' 7\" (1.702 m)   Wt 232 lb (105.2 kg)   BMI 36.34 kg/m²   CONSTITUTIONAL: Cooperative, no apparent distress, and appears well nourished / developed  NEUROLOGIC:  Awake and orientated to person, place, and time. PSYCH: Calm affect. SKIN: Warm and dry. HEENT: Sclera non-icteric, normocephalic, neck supple. RESPIRATORY:  No increased work of breathing and clear to auscultation, no crackles or wheezing.   CARDIOVASCULAR: Regular rate and rhythm without murmur. Normal S1 and S2. No gallops or rubs. Normal PMI. No elevation of JVP. Normal carotid pulses with no bruits. GI:  Normal bowel sounds. Non-distended. Non-tender to palpation  EXT: No edema. No calf tenderness. Pulses are present bilaterally. Wt Readings from Last 3 Encounters:   05/06/22 232 lb (105.2 kg)   02/02/22 237 lb (107.5 kg)   01/11/22 237 lb 3.2 oz (107.6 kg)      Pulse Readings from Last 3 Encounters:   05/06/22 60   02/02/22 80   01/11/22 60     BP Readings from Last 3 Encounters:   05/06/22 120/74   02/02/22 122/74   01/11/22 130/80        DATA:    Lab Results   Component Value Date    ALT 18 01/14/2016    AST 16 01/14/2016    ALKPHOS 59 01/14/2016    BILITOT 0.3 01/14/2016     Lab Results   Component Value Date    CREATININE 0.6 02/28/2020    BUN 15 02/28/2020     02/28/2020    K 4.9 02/28/2020     02/28/2020    CO2 26 02/28/2020       Lab Results   Component Value Date    WBC 4.3 02/28/2020    HGB 11.8 (L) 02/28/2020    HCT 36.6 02/28/2020    MCV 87.4 02/28/2020     02/28/2020     Radiology Review:  Pertinent images / reports were reviewed as a part of this visit and reveals the following:    Assessment:     recent SOB at rest and activity /fatigue/ sedentary   Appears to be compensated     [facility] normal  cors 10/2017 while visiting in [facility] neg stress test in 2018   Left heart cath June 28, 2019 normal coronaries    9/2020 TTE EF 45-50%  Left ventricular cavity size is normal with mild left ventricular hypertrophy. Overall left ventricular systolic function appears mildly reduced with an ejection fraction of 45-50%. There is hypokinesis of the lateral and posterolateral walls. Normal diastolic function. Estimated pulmonary artery systolic pressure is at 18 mmHg assuming a right atrial pressure of 3 mmHg.       HTN wnl     SSS   SJM dual chamber PPM    4/18/2022 interrogated no issues     PAF  on Eliquis   [id number] Score for Atrial Fibrillation Stroke Risk 3      SHERRI uses CPAP      9/2020 TTE EF 45-50%  Left ventricular cavity size is normal with mild left ventricular hypertrophy. Overall left ventricular systolic function appears mildly reduced with an ejection fraction of 45-50%. There is hypokinesis of the lateral and posterolateral walls. Normal diastolic function. Estimated pulmonary artery systolic pressure is at 18 mmHg assuming a right atrial pressure of 3 mmHg. Plan:   Ms Dhruv Rosas said she is to have one tooth extracted and she wanted to be sedation and not to be conscious, she said her b/p ws too high and had SOB and she refused to have tooth extracted with novocain. she c/o fatigued and not active   EKG today WNL    No c/o cp/SOB edema today     1. Lexiscan  2. TTE  3. Blood work  4. PFT   5. Fu in 2-3 weeks   6.  Next visit with Dr. Erlinda Stover     Documentation of today's visit sent to PCP

## 2022-05-09 DIAGNOSIS — Z95.0 PACEMAKER: ICD-10-CM

## 2022-05-09 DIAGNOSIS — Z00.00 ROUTINE CHECK-UP: ICD-10-CM

## 2022-05-09 DIAGNOSIS — I48.0 PAF (PAROXYSMAL ATRIAL FIBRILLATION) (HCC): ICD-10-CM

## 2022-05-09 DIAGNOSIS — I49.5 SSS (SICK SINUS SYNDROME) (HCC): ICD-10-CM

## 2022-05-09 DIAGNOSIS — I10 PRIMARY HYPERTENSION: Primary | ICD-10-CM

## 2022-05-16 ENCOUNTER — HOSPITAL ENCOUNTER (OUTPATIENT)
Dept: PULMONOLOGY | Age: 72
Discharge: HOME | End: 2022-05-16
Payer: MEDICARE

## 2022-05-16 DIAGNOSIS — I10 PRIMARY HYPERTENSION: ICD-10-CM

## 2022-05-16 DIAGNOSIS — I49.5 SSS (SICK SINUS SYNDROME) (HCC): ICD-10-CM

## 2022-05-16 DIAGNOSIS — Z00.00 ROUTINE CHECK-UP: ICD-10-CM

## 2022-05-16 DIAGNOSIS — I48.0 PAF (PAROXYSMAL ATRIAL FIBRILLATION) (HCC): ICD-10-CM

## 2022-05-16 DIAGNOSIS — Z95.0 PACEMAKER: ICD-10-CM

## 2022-05-16 PROCEDURE — 94729 DIFFUSING CAPACITY: CPT

## 2022-05-16 PROCEDURE — 94760 N-INVAS EAR/PLS OXIMETRY 1: CPT

## 2022-05-16 PROCEDURE — 6360000002 HC RX W HCPCS: Performed by: INTERNAL MEDICINE

## 2022-05-16 PROCEDURE — 94664 DEMO&/EVAL PT USE INHALER: CPT

## 2022-05-16 PROCEDURE — 94726 PLETHYSMOGRAPHY LUNG VOLUMES: CPT

## 2022-05-16 PROCEDURE — 94060 EVALUATION OF WHEEZING: CPT

## 2022-05-16 RX ORDER — ALBUTEROL SULFATE 2.5 MG/3ML
2.5 SOLUTION RESPIRATORY (INHALATION) ONCE
Status: COMPLETED | OUTPATIENT
Start: 2022-05-16 | End: 2022-05-16

## 2022-05-16 RX ADMIN — ALBUTEROL SULFATE 2.5 MG: 2.5 SOLUTION RESPIRATORY (INHALATION) at 13:04

## 2022-05-19 NOTE — PROCEDURES
[address] [address] [facility] [address]               [address] [place], [address]                               PULMONARY FUNCTION    PATIENT NAME: Diana Hampton                    :        1950  MED REC NO:   [id number]                          ROOM:  ACCOUNT NO:   [de-identified]                           ADMIT DATE: 2022  PROVIDER:     Gerri Garcia MD    DATE OF PROCEDURE:  2022    FINDINGS:  Spirometry for this patient shows an FEV1 of 1.86 which is  88% of predicted with forced vital capacity of 2.16, which is 79% of  predicted giving a ratio of 86. There was no significant response to  bronchodilators. Lung volume showed decreased total lung capacity of  72% of predicted. ERV was also low at 16%. Diffusion capacity was in  the normal range. CONCLUSION:  Moderate restrictive defect with normal diffusion and  decreased ERV. Findings could be consistent with chest wall  malformation or obesity.         Bill Burris MD    D: 2022 14:19:23       T: 2022 16:18:47     [email or web address]  Job#: [id number]     Doc#: [id number]    CC:

## 2022-05-24 ENCOUNTER — HOSPITAL ENCOUNTER (OUTPATIENT)
Dept: NON INVASIVE DIAGNOSTICS | Age: 72
Discharge: HOME | End: 2022-05-24
Payer: MEDICARE

## 2022-05-24 DIAGNOSIS — R07.9 CHEST PAIN, UNSPECIFIED TYPE: ICD-10-CM

## 2022-05-24 LAB
LV EF: 58 %
LV EF: 75 %
LVEF MODALITY: NORMAL
LVEF MODALITY: NORMAL

## 2022-05-24 PROCEDURE — 6360000002 HC RX W HCPCS: Performed by: INTERNAL MEDICINE

## 2022-05-24 PROCEDURE — 3430000000 HC RX DIAGNOSTIC RADIOPHARMACEUTICAL: Performed by: INTERNAL MEDICINE

## 2022-05-24 PROCEDURE — 93306 TTE W/DOPPLER COMPLETE: CPT

## 2022-05-24 PROCEDURE — A9502 TC99M TETROFOSMIN: HCPCS | Performed by: INTERNAL MEDICINE

## 2022-05-24 PROCEDURE — 93017 CV STRESS TEST TRACING ONLY: CPT

## 2022-05-24 PROCEDURE — 78452 HT MUSCLE IMAGE SPECT MULT: CPT

## 2022-05-24 RX ADMIN — TETROFOSMIN 30 MILLICURIE: 1.38 INJECTION, POWDER, LYOPHILIZED, FOR SOLUTION INTRAVENOUS at 10:03

## 2022-05-24 RX ADMIN — TETROFOSMIN 10 MILLICURIE: 1.38 INJECTION, POWDER, LYOPHILIZED, FOR SOLUTION INTRAVENOUS at 08:50

## 2022-05-24 RX ADMIN — REGADENOSON 0.4 MG: 0.08 INJECTION, SOLUTION INTRAVENOUS at 10:02

## 2022-05-27 ENCOUNTER — OFFICE VISIT (OUTPATIENT)
Dept: CARDIOLOGY CLINIC | Age: 72
End: 2022-05-27
Payer: MEDICARE

## 2022-05-27 VITALS
HEART RATE: 80 BPM | DIASTOLIC BLOOD PRESSURE: 60 MMHG | SYSTOLIC BLOOD PRESSURE: 120 MMHG | WEIGHT: 235.4 LBS | BODY MASS INDEX: 36.87 KG/M2

## 2022-05-27 DIAGNOSIS — I48.0 PAROXYSMAL A-FIB (HCC): ICD-10-CM

## 2022-05-27 DIAGNOSIS — I10 PRIMARY HYPERTENSION: Primary | ICD-10-CM

## 2022-05-27 DIAGNOSIS — I49.5 SSS (SICK SINUS SYNDROME) (HCC): ICD-10-CM

## 2022-05-27 PROCEDURE — 99214 OFFICE O/P EST MOD 30 MIN: CPT | Performed by: INTERNAL MEDICINE

## 2022-05-27 PROCEDURE — 1123F ACP DISCUSS/DSCN MKR DOCD: CPT | Performed by: INTERNAL MEDICINE

## 2022-05-27 NOTE — PROGRESS NOTES
[address]  Office Visit           Socorro Lopez MD,  [facility]                      Cardiology           Gonzalo Wallace  1950    May 27, 2022         HPI:  The patient is 67 y.o. female with is here is seen in the office today for follow-up. No current complaints. We did have a cardiac assessment recently including an echocardiogram and a stress test and no evidence for any coronary ischemia. She does have a on the echo and cardiomyopathy but seem to be stabilized. Her pacemaker was interrogated on 4/18/2022 and it seems to be working very well. She is on Eliquis and complains about the cost.  Eliquis is for history of atrial fibrillation. Had virus infection and survived    Has not received the coronavirus vaccine. \"No I will not take it\". [facility] normal  cors 10/2017 while visiting in [facility] neg stress test in 2018   Left heart cath June 28, 2019 normal coronaries  No SOB no wt gain no PND SHERRI   HTN SSS/ SJM dual chamber PPM    Chronic atrial fibrillation on Eliquis        Review of Systems:  Constitutional: Denies  fatigue, weakness, night sweats or fever. HEENT: Denies new visual changes, ringing in ears, nosebleeds,nasal congestion  Respiratory: Denies new or change in SOB, PND, orthopnea or cough. Cardiovascular: see HPI  GI: Denies N/V, diarrhea, constipation, abdominal pain, change in bowel habits, melena or hematochezia  : Denies urinary frequency, urgency, incontinence, hematuria or dysuria. Skin: Denies rash, hives, or cyanosis  Musculoskeletal: Denies joint or muscle aches/pain  Neurological: Denies syncope or TIA-like symptoms.   Psychiatric: Denies anxiety, insomnia or depression     Past Medical History:   Diagnosis Date    Chronic kidney disease     Hypertension      Past Surgical History:   Procedure Laterality Date    CARDIAC PACEMAKER PLACEMENT      PACEMAKER PLACEMENT       Family History   Problem Relation Age of Onset    Diabetes Mother  Heart Disease Mother     Heart Failure Sister      Social History     Tobacco Use    Smoking status: Never Smoker    Smokeless tobacco: Never Used   Vaping Use    Vaping Use: Never used   Substance Use Topics    Alcohol use: Never    Drug use: Never       Allergies   Allergen Reactions    Contrast [Iodides] Nausea And Vomiting    Morphine Nausea And Vomiting     Current Outpatient Medications   Medication Sig Dispense Refill    apixaban (ELIQUIS) 5 MG TABS tablet TAKE 1 TABLET BY MOUTH TWICE DAILY 180 tablet 3    hydrALAZINE (APRESOLINE) 25 MG tablet TAKE ONE TABLET BY MOUTH TWICE A  tablet 3    flecainide (TAMBOCOR) 50 MG tablet Take 1 tablet by mouth 2 times daily 180 tablet 3    metoprolol succinate (TOPROL XL) 25 MG extended release tablet Take 1 tablet by mouth daily 90 tablet 1    isosorbide dinitrate (ISORDIL) 10 MG tablet Take 1 tablet by mouth 2 times daily 180 tablet 1    pantoprazole (PROTONIX) 40 MG tablet Take 1 tablet by mouth daily 30 tablet 0    atorvastatin (LIPITOR) 40 MG tablet Take 40 mg by mouth daily      methocarbamol (ROBAXIN) 750 MG tablet Take 750 mg by mouth daily      Coenzyme Q10 50 MG CAPS Take 50 mg by mouth      calcium carbonate (OSCAL) 500 MG TABS tablet Take 500 mg by mouth daily      vitamin B-12 (CYANOCOBALAMIN) 100 MCG tablet Take 50 mcg by mouth daily      Multiple Vitamins-Minerals (THERAPEUTIC MULTIVITAMIN-MINERALS) tablet Take 1 tablet by mouth daily       No current facility-administered medications for this visit.        Physical Exam:   /60   Pulse 80   Wt 235 lb 6.4 oz (106.8 kg)   BMI 36.87 kg/m²   BP Readings from Last 3 Encounters:   05/27/22 120/60   05/06/22 120/74   02/02/22 122/74     Pulse Readings from Last 3 Encounters:   05/27/22 80   05/06/22 60   02/02/22 80     Wt Readings from Last 3 Encounters:   05/27/22 235 lb 6.4 oz (106.8 kg)   05/06/22 232 lb (105.2 kg)   02/02/22 237 lb (107.5 kg)     Constitutional: She is oriented to person, place, and time. She appears well-developed and well-nourished. In no acute distress. HEENT: Normocephalic and atraumatic. Sclerae anicteric. No xanthelasmas. Conjunctiva white, no subconjunctival hemorrhage   External inspection of ears nose teeth & gums   Eyes:PERRLA EOM's intact. Neck: Neck supple. No JVD present. Carotids without bruits. No mass and no thyromegaly present. No lymphadenopathy present. Cardiovascular: RRR, normal S1 and S2; no murmur/gallop or rub, PMI nondisplaced  Pulmonary/Chest: Effort normal.  Lungs clear to auscultation. Chest wall nontender  Abdominal: soft, nontender, nondistended. + bowel sounds; no organomegaly or bruits. Aorta normal  Extremities: No edema, cyanosis, or clubbing. Pulses are 2+ radial/carotid/dorsalis pedis bilaterally. Cap refill brisk. Neurological: No cranial nerve deficit. Psychiatric: She has a normal mood and affect. Her speech is normal and behavior is normal.     Lab Review:   No results found for: TRIG, HDL, LDLCALC, LDLDIRECT, LABVLDL  Lab Results   Component Value Date     02/28/2020    K 4.9 02/28/2020     02/28/2020    CO2 26 02/28/2020    BUN 15 02/28/2020    CREATININE 0.6 02/28/2020    GLUCOSE 102 02/28/2020    CALCIUM 10.1 02/28/2020      Lab Results   Component Value Date    WBC 4.3 02/28/2020    HGB 11.8 (L) 02/28/2020    HCT 36.6 02/28/2020    MCV 87.4 02/28/2020     02/28/2020 2/25/21  Interpetation Summary \"Nuclear imaging results reported separately by  radiology department\":         o Negative ECG for ischemia with pharmocologic stress. IMPRESSION 2/26/21        No significant abnormality on chest CT    Procedure: 19453 Pharmacologic stress with Isotope. Study Protocol: One day  Assessment:    Pacemaker leads seems to potentially have some decreased impedance. I think they are still working adequately. And she is not dependent on the pacemaker at this time. We will continue to monitor her. She has about 4 and half years of battery life. Regarding the burning in her left calf I think it may be some type of neuropathy of sorts. She does not have diabetes and her last labs done late April 2019 were unremarkable. EKG on 10/14/2020 done with magnet shows ventricular paced rhythm at 100/min. It is atrial sensed. On 10/14/2020 EKG without magnet shows sinus rhythm at 65/min. Normal study. [facility] normal  cors 10/2017   / neg stress test in 2018   Summary 9/26/18   Left ventricular cavity size is normal with mild concentric left ventricular   hypertrophy. Left ventricular function is mildly reduced with ejection fraction estimated   at 45-50%. Lateral wall and posterolateral wall appears hypokinetic. Diastolic filling parameters suggest grade I diastolic dysfunction. Aortic valve appears slightly thickened but opens adequately. Mild tricuspid regurgitation. Estimated pulmonary artery systolic pressure is at 37 mmHg assuming a right   atrial pressure of 3 mmHg. Summary 9/21/20   Left ventricular cavity size is normal with mild left ventricular   hypertrophy. Overall left ventricular systolic function appears mildly reduced with an   ejection fraction of 45-50%. There is hypokinesis of the lateral and posterolateral walls. Normal diastolic function. Estimated pulmonary artery systolic pressure is at 18 mmHg assuming a right   atrial pressure of 3 mmHg. Summary 5/24/22 Normal left ventricle size, wall thickness, and systolic function with an   estimated ejection fraction of 55-60%. No regional wall motion abnormalities   Normal diastolic function. Pacer / ICD wire is visualized in the right ventricle. Trivial mitral regurgitation. Estimated pulmonary artery systolic pressure is at 23 mmHg assuming a right   atrial pressure of 3 mmHg. Summary 5/24/22     There is normal isotope uptake at stress and rest. There is no evidence of    myocardial ischemia or scar.  Normal LV size and systolic function.    Left ventricular ejection fraction of 75 %.    There are no regional wall motion abnormalities.    Overall findings represent a low risk scan.        5/16/22 PFTs normal .      4/18/22 PACEART. We received remote transmission from patient's dual chamber pacemaker monitor at home. Transmission shows normal sensing and pacing function. Slight decreasing trend noted in Rwaves; AutoSense On. No new arrhythmias/events recorded. EP physician will review. See interrogation under cardiology tab in the [address] [place] [address] [address] field for more details. Encounter Type: Remote  Encounter Date: 4/18/2022 2:00:10 AM  Last    HTN  optimal   SSS/ SJM dual chamber PPM    11/2018   SJM  Interrogation [facility] dual chamber ACCENT   assessed & reprogrammed   Battery life good  Noise noted on lead / assessed by rep   A paced/ 85%  V paced<1%   afib <1% / 22 sec afib but looks like noise     SHERRI   usues CPAP regularly       Plan: We reviewed her labs including the echocardiograph and the stress test and a pulmonary function test with her and also the interrogation of her device. All looks stable. We will have her return to see us in 1 year. Juventino Faustin MD, [facility]

## 2022-06-06 RX ORDER — METOPROLOL SUCCINATE 25 MG/1
25 TABLET, EXTENDED RELEASE ORAL DAILY
Qty: 90 TABLET | Refills: 3 | Status: SHIPPED | OUTPATIENT
Start: 2022-06-06

## 2022-06-06 NOTE — TELEPHONE ENCOUNTER
Requested Prescriptions     Pending Prescriptions Disp Refills    metoprolol succinate (TOPROL XL) 25 MG extended release tablet 90 tablet 3     Sig: Take 1 tablet by mouth daily                Last Office Visit: 5/27/2022     Next Office Visit: 7/18/2022       Last Labs: [facility] 3/16/2022 [facility], cbc, hepatic function

## 2022-08-16 ENCOUNTER — NURSE ONLY (OUTPATIENT)
Dept: CARDIOLOGY CLINIC | Age: 72
End: 2022-08-16
Payer: MEDICARE

## 2022-08-16 ENCOUNTER — TELEPHONE (OUTPATIENT)
Dept: CARDIOLOGY CLINIC | Age: 72
End: 2022-08-16

## 2022-08-16 DIAGNOSIS — I49.5 SSS (SICK SINUS SYNDROME) (HCC): ICD-10-CM

## 2022-08-16 DIAGNOSIS — Z95.0 PACEMAKER: Primary | ICD-10-CM

## 2022-08-16 PROCEDURE — 93294 REM INTERROG EVL PM/LDLS PM: CPT | Performed by: INTERNAL MEDICINE

## 2022-08-16 PROCEDURE — 93296 REM INTERROG EVL PM/IDS: CPT | Performed by: INTERNAL MEDICINE

## 2022-08-16 NOTE — PROGRESS NOTES
We received remote transmission from patient's dual chamber pacemaker monitor at home. Transmission shows normal sensing and pacing function. Slight decreasing trend noted in Rwaves; AutoSense On. <1% AT/ AF burden (Toprol, flecainide, Eliquis). EP physician will review. See interrogation under cardiology tab in the [address] [place] [address] [address] field for more details. (End of 91-day monitoring period 8/16/22).

## 2022-08-30 ENCOUNTER — TELEPHONE (OUTPATIENT)
Dept: CARDIOLOGY CLINIC | Age: 72
End: 2022-08-30

## 2022-09-07 RX ORDER — ISOSORBIDE DINITRATE 10 MG/1
10 TABLET ORAL 2 TIMES DAILY
Qty: 180 TABLET | Refills: 2 | Status: SHIPPED | OUTPATIENT
Start: 2022-09-07

## 2022-09-15 ENCOUNTER — TELEPHONE (OUTPATIENT)
Dept: CARDIOLOGY CLINIC | Age: 72
End: 2022-09-15

## 2022-09-15 NOTE — TELEPHONE ENCOUNTER
Patient called in and said she is having a root canal and wanted to know if there was anything she needed to do before it, like antibiotics or anything.   [phone]

## 2022-09-16 NOTE — TELEPHONE ENCOUNTER
HI  Please inform her she doesn't not need abx before her root canal unless her dentist wants her to have one  CHRYSTAL thanks

## 2022-09-16 NOTE — TELEPHONE ENCOUNTER
Called and left patient a voicemail letting her know that she doesn't not need abx before her root canal unless her dentist wants her to have one.

## 2022-09-19 ENCOUNTER — TELEPHONE (OUTPATIENT)
Dept: CARDIOLOGY CLINIC | Age: 72
End: 2022-09-19

## 2022-09-19 NOTE — TELEPHONE ENCOUNTER
Pt is requesting samples of Eliquis 5 mg and can come to [place] to  if available.  Tita did not have any when she called 9/7. [phone]

## 2022-10-06 ENCOUNTER — TELEPHONE (OUTPATIENT)
Dept: CARDIOLOGY CLINIC | Age: 72
End: 2022-10-06

## 2022-10-06 NOTE — TELEPHONE ENCOUNTER
Patient is calling in saying she needs a new prescription for her Eliquis 5 mg written and sent to the following pharmacy:    [facility] [id number] - [address] [facility] [address] [facility], [address] [address]   Phone:  [phone]  Fax:  [phone]

## 2022-11-29 ENCOUNTER — NURSE ONLY (OUTPATIENT)
Dept: CARDIOLOGY CLINIC | Age: 72
End: 2022-11-29
Payer: MEDICARE

## 2022-11-29 DIAGNOSIS — I49.5 SSS (SICK SINUS SYNDROME) (HCC): Primary | ICD-10-CM

## 2022-11-29 DIAGNOSIS — Z95.0 PACEMAKER: ICD-10-CM

## 2022-11-29 PROCEDURE — 93296 REM INTERROG EVL PM/IDS: CPT | Performed by: INTERNAL MEDICINE

## 2022-11-29 PROCEDURE — 93294 REM INTERROG EVL PM/LDLS PM: CPT | Performed by: INTERNAL MEDICINE

## 2022-11-29 NOTE — PROGRESS NOTES
We received remote transmission from patient's dual chamber pacemaker monitor at home. Transmission shows normal sensing and pacing function. Slight decreasing trend noted in Rwaves; AutoSense On. PMT noted. EP physician will review. See interrogation under cardiology tab in the [address] [place] [address] [address] field for more details.     (End of 91-day monitoring period 11/29/22)

## 2022-11-30 ENCOUNTER — OFFICE VISIT (OUTPATIENT)
Dept: CARDIOLOGY CLINIC | Age: 72
End: 2022-11-30

## 2022-11-30 VITALS
BODY MASS INDEX: 36.26 KG/M2 | HEIGHT: 67 IN | SYSTOLIC BLOOD PRESSURE: 138 MMHG | WEIGHT: 231 LBS | HEART RATE: 64 BPM | DIASTOLIC BLOOD PRESSURE: 76 MMHG

## 2022-11-30 DIAGNOSIS — Z00.00 ROUTINE CHECK-UP: Primary | ICD-10-CM

## 2022-11-30 NOTE — PROGRESS NOTES
[address]  Office Visit           Jesika Guardado MD,  [facility] [place]                      Cardiology           Ninoska Avina  1950 November 30, 2022         HPI:  The patient is 67 y.o. female with is here is seen in the office today for follow-up. Some chest discomfort. Seems atypical.  She did have a work-up just very recently showed no evidence for coronary artery disease. I think it is probably musculoskeletal.  Examination unremarkable. Lungs are clear extremities do not show edema. Had virus infection and survived    Has not received the coronavirus vaccine. \"No I will not take it\". [facility] normal  cors 10/2017 while visiting in [facility] neg stress test in 2018   Left heart cath June 28, 2019 normal coronaries  No SOB no wt gain no PND SHERRI   HTN SSS/ SJM dual chamber PPM    Chronic atrial fibrillation on Eliquis        Review of Systems:  Constitutional: Denies  fatigue, weakness, night sweats or fever. HEENT: Denies new visual changes, ringing in ears, nosebleeds,nasal congestion  Respiratory: Denies new or change in SOB, PND, orthopnea or cough. Cardiovascular: see HPI  GI: Denies N/V, diarrhea, constipation, abdominal pain, change in bowel habits, melena or hematochezia  : Denies urinary frequency, urgency, incontinence, hematuria or dysuria. Skin: Denies rash, hives, or cyanosis  Musculoskeletal: Denies joint or muscle aches/pain  Neurological: Denies syncope or TIA-like symptoms.   Psychiatric: Denies anxiety, insomnia or depression     Past Medical History:   Diagnosis Date    Chronic kidney disease     Hypertension      Past Surgical History:   Procedure Laterality Date    CARDIAC PACEMAKER PLACEMENT      PACEMAKER PLACEMENT       Family History   Problem Relation Age of Onset    Diabetes Mother     Heart Disease Mother     Heart Failure Sister      Social History     Tobacco Use    Smoking status: Never    Smokeless tobacco: Never   Vaping Use    Vaping Use: Never used   Substance Use Topics    Alcohol use: Never    Drug use: Never       Allergies   Allergen Reactions    Contrast [Iodides] Nausea And Vomiting    Morphine Nausea And Vomiting     Current Outpatient Medications   Medication Sig Dispense Refill    isosorbide dinitrate (ISORDIL) 10 MG tablet Take 1 tablet by mouth 2 times daily 180 tablet 2    metoprolol succinate (TOPROL XL) 25 MG extended release tablet Take 1 tablet by mouth daily 90 tablet 3    apixaban (ELIQUIS) 5 MG TABS tablet TAKE 1 TABLET BY MOUTH TWICE DAILY 180 tablet 3    hydrALAZINE (APRESOLINE) 25 MG tablet TAKE ONE TABLET BY MOUTH TWICE A  tablet 3    flecainide (TAMBOCOR) 50 MG tablet Take 1 tablet by mouth 2 times daily 180 tablet 3    pantoprazole (PROTONIX) 40 MG tablet Take 1 tablet by mouth daily 30 tablet 0    atorvastatin (LIPITOR) 40 MG tablet Take 40 mg by mouth daily      methocarbamol (ROBAXIN) 750 MG tablet Take 750 mg by mouth daily      Coenzyme Q10 50 MG CAPS Take 50 mg by mouth      calcium carbonate (OSCAL) 500 MG TABS tablet Take 500 mg by mouth daily      vitamin B-12 (CYANOCOBALAMIN) 100 MCG tablet Take 50 mcg by mouth daily      Multiple Vitamins-Minerals (THERAPEUTIC MULTIVITAMIN-MINERALS) tablet Take 1 tablet by mouth daily       No current facility-administered medications for this visit. Physical Exam:   /76   Pulse 64   Ht 5' 7\" (1.702 m)   Wt 231 lb (104.8 kg)   BMI 36.18 kg/m²   BP Readings from Last 3 Encounters:   11/30/22 138/76   05/27/22 120/60   05/06/22 120/74     Pulse Readings from Last 3 Encounters:   11/30/22 64   05/27/22 80   05/06/22 60     Wt Readings from Last 3 Encounters:   11/30/22 231 lb (104.8 kg)   05/27/22 235 lb 6.4 oz (106.8 kg)   05/06/22 232 lb (105.2 kg)     Constitutional: She is oriented to person, place, and time. She appears well-developed and well-nourished. In no acute distress. HEENT: Normocephalic and atraumatic. Sclerae anicteric. No xanthelasmas. Conjunctiva white, no subconjunctival hemorrhage   External inspection of ears nose teeth & gums   Eyes:PERRLA EOM's intact. Neck: Neck supple. No JVD present. Carotids without bruits. No mass and no thyromegaly present. No lymphadenopathy present. Cardiovascular: RRR, normal S1 and S2; no murmur/gallop or rub, PMI nondisplaced  Pulmonary/Chest: Effort normal.  Lungs clear to auscultation. Chest wall nontender  Abdominal: soft, nontender, nondistended. + bowel sounds; no organomegaly or bruits. Aorta normal  Extremities: No edema, cyanosis, or clubbing. Pulses are 2+ radial/carotid/dorsalis pedis bilaterally. Cap refill brisk. Neurological: No cranial nerve deficit. Psychiatric: She has a normal mood and affect. Her speech is normal and behavior is normal.     Lab Review:   No results found for: TRIG, HDL, LDLCALC, LDLDIRECT, LABVLDL  Lab Results   Component Value Date/Time     02/28/2020 07:28 AM    K 4.9 02/28/2020 07:28 AM     02/28/2020 07:28 AM    CO2 26 02/28/2020 07:28 AM    BUN 15 02/28/2020 07:28 AM    CREATININE 0.6 02/28/2020 07:28 AM    GLUCOSE 102 02/28/2020 07:28 AM    CALCIUM 10.1 02/28/2020 07:28 AM      Lab Results   Component Value Date    WBC 4.3 02/28/2020    HGB 11.8 (L) 02/28/2020    HCT 36.6 02/28/2020    MCV 87.4 02/28/2020     02/28/2020 2/25/21  Interpetation Summary \"Nuclear imaging results reported separately by  radiology department\":         o Negative ECG for ischemia with pharmocologic stress. IMPRESSION 2/26/21        No significant abnormality on chest CT    Procedure: 59627 Pharmacologic stress with Isotope. Study Protocol: One day  Assessment:    Pacemaker leads seems to potentially have some decreased impedance. I think they are still working adequately. And she is not dependent on the pacemaker at this time. We will continue to monitor her. She has about 4 and half years of battery life.   Regarding the burning in her left calf I think it may be some type of neuropathy of sorts. She does not have diabetes and her last labs done late April 2019 were unremarkable. EKG on 10/14/2020 done with magnet shows ventricular paced rhythm at 100/min. It is atrial sensed. On 10/14/2020 EKG without magnet shows sinus rhythm at 65/min. On 11/30/2022 atrial paced rhythm at 60 bpm.  Low voltage in precordial leads otherwise stable. [facility] normal  cors 10/2017   / neg stress test in 2018   Summary 9/26/18   Left ventricular cavity size is normal with mild concentric left ventricular   hypertrophy. Left ventricular function is mildly reduced with ejection fraction estimated   at 45-50%. Lateral wall and posterolateral wall appears hypokinetic. Diastolic filling parameters suggest grade I diastolic dysfunction. Aortic valve appears slightly thickened but opens adequately. Mild tricuspid regurgitation. Estimated pulmonary artery systolic pressure is at 37 mmHg assuming a right   atrial pressure of 3 mmHg. Summary 9/21/20   Left ventricular cavity size is normal with mild left ventricular   hypertrophy. Overall left ventricular systolic function appears mildly reduced with an   ejection fraction of 45-50%. There is hypokinesis of the lateral and posterolateral walls. Normal diastolic function. Estimated pulmonary artery systolic pressure is at 18 mmHg assuming a right   atrial pressure of 3 mmHg. Summary 5/24/22 Normal left ventricle size, wall thickness, and systolic function with an   estimated ejection fraction of 55-60%. No regional wall motion abnormalities   Normal diastolic function. Pacer / ICD wire is visualized in the right ventricle. Trivial mitral regurgitation. Estimated pulmonary artery systolic pressure is at 23 mmHg assuming a right   atrial pressure of 3 mmHg. Summary 5/24/22     There is normal isotope uptake at stress and rest. There is no evidence of    myocardial ischemia or scar.     Normal LV size and

## 2023-01-03 NOTE — RESULT ENCOUNTER NOTE
Reviewed Subjective   Serafin Nash is a 57 y.o. male who presents to the office for chronic back and neck pain follow-up.  Is requesting his Gabapentin which he hasn't had since August, last drug screen was POSITIVE for several illicit substances.  At last visit in September on September 23 I ordered a cervical spine MRI which he said he never received a phone call about and needs to be rescheduled.  Was also due to have nerve conduction studies on 12/6 but says he had an emergency to come up and was rescheduled for January 31st.  Is still having lumbar pain, had laminectomy on 8/4 by Nuha, says he \"took care of nerve stuff but didn't think he took care of any disc problems.\"    History of Present Illness     The following portions of the patient's history were reviewed and updated as appropriate: allergies, current medications, past family history, past medical history, past social history, past surgical history and problem list.    Review of Systems   Constitutional: Negative for chills, fatigue and fever.   HENT: Negative for congestion, sneezing, sore throat and trouble swallowing.    Eyes: Negative for visual disturbance.   Respiratory: Negative for cough, chest tightness, shortness of breath and wheezing.    Cardiovascular: Negative for chest pain, palpitations and leg swelling.   Gastrointestinal: Negative for abdominal pain, constipation, diarrhea, nausea and vomiting.   Genitourinary: Negative for dysuria, frequency and urgency.   Musculoskeletal: Positive for arthralgias, back pain and neck pain.   Skin: Negative for rash.   Neurological: Negative for dizziness, weakness and headaches.   Psychiatric/Behavioral:        In the past two weeks the pt has not felt down, depressed, hopeless or lost interest in doing things   All other systems reviewed and are negative.      Objective   Physical Exam  Vitals and nursing note reviewed.   Constitutional:       General: He is not in acute distress.     Appearance:  He is well-developed. He is not ill-appearing.   HENT:      Head: Normocephalic and atraumatic.      Right Ear: External ear normal.      Left Ear: External ear normal.      Nose: Nose normal.   Eyes:      General: No scleral icterus.        Right eye: No discharge.         Left eye: No discharge.      Conjunctiva/sclera: Conjunctivae normal.      Pupils: Pupils are equal, round, and reactive to light.   Neck:      Thyroid: No thyromegaly.   Cardiovascular:      Rate and Rhythm: Normal rate and regular rhythm.      Heart sounds: Normal heart sounds. No murmur heard.    No friction rub. No gallop.   Pulmonary:      Effort: Pulmonary effort is normal. No respiratory distress.      Breath sounds: Normal breath sounds. No wheezing or rales.   Abdominal:      General: Bowel sounds are normal. There is no distension.      Palpations: Abdomen is soft.      Tenderness: There is no abdominal tenderness. There is no guarding or rebound.   Musculoskeletal:      Cervical back: Neck supple. Decreased range of motion.      Lumbar back: Decreased range of motion.   Lymphadenopathy:      Cervical: No cervical adenopathy.   Skin:     General: Skin is warm and dry.      Capillary Refill: Capillary refill takes less than 2 seconds.      Findings: No rash.   Neurological:      General: No focal deficit present.      Mental Status: He is alert and oriented to person, place, and time.      GCS: GCS eye subscore is 4. GCS verbal subscore is 5. GCS motor subscore is 6.      Cranial Nerves: Cranial nerves 2-12 are intact. No cranial nerve deficit.   Psychiatric:         Behavior: Behavior normal. Behavior is cooperative.         Thought Content: Thought content normal.         Judgment: Judgment normal.         Assessment & Plan   Diagnoses and all orders for this visit:    1. Medication monitoring encounter (Primary)  -     ToxASSURE Select 13 Discrete -; Future    2. Benign essential HTN  -     T4, Free; Future  -     TSH; Future  -      Comprehensive Metabolic Panel; Future  -     CBC & Differential; Future    3. Chronic midline low back pain, unspecified whether sciatica present    4. Prostate cancer screening  -     PSA Screen; Future    5. Chronic midline posterior neck pain  -     MRI Cervical Spine Without Contrast; Future    Will certainly be assessing his ToxAssure today and looking over for any abnormalities.    Reordering cervical spine to assess for any pathology.    Reviewed JOE# [id number].             This document has been electronically signed by IGNACIA Bustamante on January 3, 2023 08:53 CST    Answers for HPI/ROS submitted by the patient on 12/29/2022  What is the primary reason for your visit?: Other  Please describe your symptoms.: Prostate screening assessment  Have you had these symptoms before?: No  How long have you been having these symptoms?: 1-2 weeks  Please describe any probable cause for these symptoms. : My father had prostate cancer and I haven't had a screening before. I figured it was time for me to have it done.

## 2023-03-14 ENCOUNTER — NURSE ONLY (OUTPATIENT)
Dept: CARDIOLOGY CLINIC | Age: 73
End: 2023-03-14
Payer: MEDICARE

## 2023-03-14 DIAGNOSIS — I49.5 SSS (SICK SINUS SYNDROME) (HCC): ICD-10-CM

## 2023-03-14 DIAGNOSIS — Z95.0 PACEMAKER: Primary | ICD-10-CM

## 2023-03-14 PROCEDURE — 93294 REM INTERROG EVL PM/LDLS PM: CPT | Performed by: INTERNAL MEDICINE

## 2023-03-14 PROCEDURE — 93296 REM INTERROG EVL PM/IDS: CPT | Performed by: INTERNAL MEDICINE

## 2023-03-20 NOTE — PROGRESS NOTES
Remote transmission received from patient's dual chamber pacemaker monitor at home. Transmission shows normal sensing and pacing function. Slight decreasing trend noted in Rwaves; AutoSense On. RV threshold monitoring is OFF likely d/t pt sx/low . No new arrhythmias/events recorded. Ap 96%   <1%  PVCs <1    End of 91-day monitoring period 3/14/23. EP physician will review. See interrogation under cardiology tab in the [address] [place] [address] [address] field for more details. Will continue to monitor remotely.

## 2023-03-22 RX ORDER — ISOSORBIDE DINITRATE 10 MG/1
10 TABLET ORAL 2 TIMES DAILY
Qty: 180 TABLET | Refills: 2 | Status: SHIPPED | OUTPATIENT
Start: 2023-03-22

## 2023-03-22 RX ORDER — METOPROLOL SUCCINATE 25 MG/1
25 TABLET, EXTENDED RELEASE ORAL DAILY
Qty: 90 TABLET | Refills: 3 | Status: SHIPPED | OUTPATIENT
Start: 2023-03-22

## 2023-03-22 RX ORDER — HYDRALAZINE HYDROCHLORIDE 25 MG/1
25 TABLET, FILM COATED ORAL 2 TIMES DAILY
Qty: 180 TABLET | Refills: 3 | Status: SHIPPED | OUTPATIENT
Start: 2023-03-22

## 2023-03-22 RX ORDER — FLECAINIDE ACETATE 50 MG/1
50 TABLET ORAL 2 TIMES DAILY
Qty: 180 TABLET | Refills: 3 | Status: SHIPPED | OUTPATIENT
Start: 2023-03-22

## 2023-03-22 RX ORDER — PANTOPRAZOLE SODIUM 40 MG/1
40 TABLET, DELAYED RELEASE ORAL DAILY
Qty: 30 TABLET | Refills: 0 | Status: SHIPPED | OUTPATIENT
Start: 2023-03-22

## 2023-03-22 NOTE — TELEPHONE ENCOUNTER
The patient has switched pharmacies and would like all her medications to go to [facility].      isosorbide dinitrate (ISORDIL) 10 MG tablet [[id number]]     Order Details  Dose: 10 mg Route: Oral Frequency: 2 TIMES DAILY   Dispense Quantity: 180 tablet Refills: 2          Sig: Take 1 tablet by mouth 2 times daily       metoprolol succinate (TOPROL XL) 25 MG extended release tablet [[id number]]     Order Details  Dose: 25 mg Route: Oral Frequency: DAILY   Dispense Quantity: 90 tablet Refills: 3          Sig: Take 1 tablet by mouth daily       apixaban (ELIQUIS) 5 MG TABS tablet [[id number]]     Order Details  Dose, Route, Frequency: As Directed   Dispense Quantity: 180 tablet Refills: 3          Sig: TAKE 1 TABLET BY MOUTH TWICE DAILY       hydrALAZINE (APRESOLINE) 25 MG tablet [[id number]]     Order Details  Dose, Route, Frequency: As Directed   Dispense Quantity: 180 tablet Refills: 3          Sig: TAKE ONE TABLET BY MOUTH TWICE A DAY             flecainide (TAMBOCOR) 50 MG tablet [[id number]]     Order Details  Dose: 50 mg Route: Oral Frequency: 2 TIMES DAILY   Dispense Quantity: 180 tablet Refills: 3          Sig: Take 1 tablet by mouth 2 times daily             pantoprazole (PROTONIX) 40 MG tablet [[id number]]     Order Details  Dose: 40 mg Route: Oral Frequency: DAILY   Dispense Quantity: 30 tablet Refills: 0          Sig: Take 1 tablet by mouth daily       [facility] [address] [facility] [phone]      Last Office Visit: 11/30/22    Next Office Visit: 06/05/23    Last Refill: 09/07/22    Last Labs: 02/28/20

## 2023-03-24 ENCOUNTER — TELEPHONE (OUTPATIENT)
Dept: CARDIOLOGY CLINIC | Age: 73
End: 2023-03-24

## 2023-03-24 DIAGNOSIS — I49.5 SSS (SICK SINUS SYNDROME) (HCC): ICD-10-CM

## 2023-03-24 DIAGNOSIS — I48.0 PAF (PAROXYSMAL ATRIAL FIBRILLATION) (HCC): ICD-10-CM

## 2023-03-24 NOTE — TELEPHONE ENCOUNTER
Patient called stating she requested refills on her prescriptions but the apixaban (ELIQUIS) 5 MG TABS tablet was not sent to her new pharmacy which is [facility]. Patient can be reach on home number listed.

## 2023-04-17 DIAGNOSIS — I49.5 SSS (SICK SINUS SYNDROME) (HCC): ICD-10-CM

## 2023-04-17 DIAGNOSIS — I48.0 PAF (PAROXYSMAL ATRIAL FIBRILLATION) (HCC): ICD-10-CM

## 2023-06-27 ENCOUNTER — NURSE ONLY (OUTPATIENT)
Dept: CARDIOLOGY CLINIC | Age: 73
End: 2023-06-27

## 2023-06-27 DIAGNOSIS — Z95.0 PACEMAKER: Primary | ICD-10-CM

## 2023-06-27 DIAGNOSIS — I49.5 SSS (SICK SINUS SYNDROME) (HCC): ICD-10-CM

## 2023-06-29 ENCOUNTER — TELEPHONE (OUTPATIENT)
Dept: CARDIOLOGY CLINIC | Age: 73
End: 2023-06-29

## 2023-07-03 NOTE — TELEPHONE ENCOUNTER
Called and left patient a voicemail letting her know that per Alicai Castillo she may take the medication Zonisomide for weight loss.

## 2023-07-14 NOTE — PROGRESS NOTES
Remote transmission received from patient's dual chamber pacemaker monitor at home. Transmission shows normal sensing and pacing function. RV threshold monitoring is OFF likely d/t pt sx/low . No new arrhythmias/events recorded. TIM/ RRT in 12 mos. Ap 97%   <1%  PVCs <1    End of 91-day monitoring period 6/27/23. EP physician will review. See interrogation under cardiology tab in the [address] field for more details. Will continue to monitor remotely.

## 2023-07-17 ENCOUNTER — OFFICE VISIT (OUTPATIENT)
Dept: CARDIOLOGY CLINIC | Age: 73
End: 2023-07-17
Payer: MEDICARE

## 2023-07-17 VITALS
HEART RATE: 60 BPM | SYSTOLIC BLOOD PRESSURE: 120 MMHG | HEIGHT: 67 IN | WEIGHT: 232 LBS | BODY MASS INDEX: 36.41 KG/M2 | DIASTOLIC BLOOD PRESSURE: 80 MMHG | OXYGEN SATURATION: 96 %

## 2023-07-17 DIAGNOSIS — I48.0 PAROXYSMAL A-FIB (HCC): Primary | ICD-10-CM

## 2023-07-17 DIAGNOSIS — R07.9 CHEST PAIN, UNSPECIFIED TYPE: ICD-10-CM

## 2023-07-17 PROCEDURE — 99214 OFFICE O/P EST MOD 30 MIN: CPT | Performed by: INTERNAL MEDICINE

## 2023-07-17 PROCEDURE — 3074F SYST BP LT 130 MM HG: CPT | Performed by: INTERNAL MEDICINE

## 2023-07-17 PROCEDURE — 1123F ACP DISCUSS/DSCN MKR DOCD: CPT | Performed by: INTERNAL MEDICINE

## 2023-07-17 PROCEDURE — 93000 ELECTROCARDIOGRAM COMPLETE: CPT | Performed by: INTERNAL MEDICINE

## 2023-07-17 PROCEDURE — 3079F DIAST BP 80-89 MM HG: CPT | Performed by: INTERNAL MEDICINE

## 2023-07-17 RX ORDER — ZONISAMIDE 50 MG/1
50 CAPSULE ORAL DAILY
COMMUNITY

## 2023-07-17 NOTE — PROGRESS NOTES
Normal diastolic function. Pacer / ICD wire is visualized in the right ventricle. Trivial mitral regurgitation. Estimated pulmonary artery systolic pressure is at 23 mmHg assuming a right   atrial pressure of 3 mmHg. Summary 5/24/22     There is normal isotope uptake at stress and rest. There is no evidence of    myocardial ischemia or scar. Normal LV size and systolic function. Left ventricular ejection fraction of 75 %. There are no regional wall motion abnormalities. Overall findings represent a low risk scan. 5/16/22 PFTs normal .      4/18/22 PACEART. We received remote transmission from patient's dual chamber pacemaker monitor at home. Transmission shows normal sensing and pacing function. Slight decreasing trend noted in Rwaves; AutoSense On. No new arrhythmias/events recorded. EP physician will review. See interrogation under cardiology tab in the [address] field for more details. Encounter Type: Remote  Encounter Date: 4/18/2022 2:00:10 AM  Last    HTN  optimal   SSS/ SJM dual chamber PPM    11/2018   SJM  Interrogation [facility] dual chamber ACCENT   assessed & reprogrammed   Battery life good  Noise noted on lead / assessed by rep   A paced/ 85%  V paced<1%   afib <1% / 22 sec afib but looks like noise     SHERRI   usues CPAP regularly       Plan: To new current medicines. No changes in anything. Seems that her neck her device is working beautifully. No evidence of   light headedness or dizziness. Turn in 6 months. Apoorva Nicole MD, [facility]                                                                                 SHERRI   usues CPAP regularly       Plan: To new current medicines. No changes in anything. Seems that her neck her device is working beautifully. No evidence of   light headedness or dizziness. Turn in 6 months.   Apoorva Nicole MD, [facility]

## 2023-07-21 ENCOUNTER — TELEPHONE (OUTPATIENT)
Dept: CARDIOLOGY CLINIC | Age: 73
End: 2023-07-21

## 2023-07-21 NOTE — TELEPHONE ENCOUNTER
Lam Bird, 1950    Cardiac Risk Assessment    What type of procedure are you having?:Colonoscopy     When is your procedure scheduled for?:Not scheduled     What physician is performing your procedure?: [address]     Phone Number:[phone]    Fax number to send the letter:    Cardiologist: Dr. Benson Persons     Last Appointment:07/17/2023    Next Appointment:11/17/2023    Are you on any blood thinners?:Eliquis

## 2023-07-24 ENCOUNTER — HOSPITAL ENCOUNTER (OUTPATIENT)
Dept: CT IMAGING | Age: 73
Discharge: HOME | End: 2023-07-24
Attending: INTERNAL MEDICINE
Payer: MEDICARE

## 2023-07-24 DIAGNOSIS — R07.9 CHEST PAIN, UNSPECIFIED TYPE: ICD-10-CM

## 2023-07-24 PROCEDURE — 71250 CT THORAX DX C-: CPT

## 2023-07-28 ENCOUNTER — TELEPHONE (OUTPATIENT)
Dept: CARDIOLOGY CLINIC | Age: 73
End: 2023-07-28

## 2023-07-31 NOTE — TELEPHONE ENCOUNTER
Patient called and stated she hasn't heard anything from anyone pertaining to her lab results. She was informed that someone tried to call but  no answer. She wants a call back [phone] and could someone leave a message on the machine if she doesn't answer. She has a procedure today at 1:30. She would like her results before the procedure so she can determine whether she still needs to do it.

## 2023-08-04 ENCOUNTER — NURSE ONLY (OUTPATIENT)
Dept: CARDIOLOGY CLINIC | Age: 73
End: 2023-08-04

## 2023-08-04 DIAGNOSIS — I48.0 PAF (PAROXYSMAL ATRIAL FIBRILLATION) (HCC): ICD-10-CM

## 2023-08-04 DIAGNOSIS — I49.5 SSS (SICK SINUS SYNDROME) (HCC): ICD-10-CM

## 2023-08-04 DIAGNOSIS — Z95.0 PACEMAKER: Primary | ICD-10-CM

## 2023-08-25 NOTE — PROGRESS NOTES
[facility]   Electrophysiology  Office Visit  Date: 8/28/2023    Chief Complaint   Patient presents with    Bradycardia    Atrial Fibrillation    Hypertension       Cardiac HX: Jasmina Nelson is a 75 y. o. woman with h/o HTN, HLD, SHERRI on CPAP, SSS, s/p dual chamber SJM PPM (Dr. Cristobal Pop, 7/31/14), c/p CP, abnormal MPI, s/p LHC (6/2019, . [facility]) showed normal cors, noted to have pAF on device, on Eliquis, noted to have noise on RA/RV leads, s/p  RA/RV lead revision (3/3/2020 has not felt any heart racing, palpitations or irregular heartbeats. She denies any chest pain, shortness of breath, PND, orthopnea or lower extremity edema., Dr. Zandra Wu). Interval History/HPI: Patient is here for follow-up for SSS, paroxysmal AF and PPM management. A longstanding history of sick sinus syndrome. She was implanted with a dual-chamber SJM PPM in July 2014. Gone to [facility] to see her mother and developed chest pain in 2019. She had a stress test that was abnormal and underwent a left heart cath at that time that showed normal coronaries. In follow-up she was noted to have paroxysmal atrial fibrillation on her device. She was placed on Eliquis. She then was noted to have noise on both her right atrial and right ventricular leads. She underwent an RA/RV lead revision in March 2020. Today she presents in normal sinus rhythm. Review of her device today shows that she atrially paces 95% of the time and ventricularly paces less than 1% of the time. Her PVC burden is less than 1%. There are no arrhythmias on her device. Other parameters are stable. Her estimated longevity is 1.1 years. Did have an echo in September 2020 that showed her EF was 45 to 50%. Her EF echo in May 2022 was 55 to 60%. Denies any chest pain, shortness of breath, PND, orthopnea or lower extremity edema. Blood pressure is well controlled in the office today.       Home medications:   Current Outpatient Medications on

## 2023-08-28 ENCOUNTER — NURSE ONLY (OUTPATIENT)
Dept: CARDIOLOGY CLINIC | Age: 73
End: 2023-08-28

## 2023-08-28 ENCOUNTER — OFFICE VISIT (OUTPATIENT)
Dept: CARDIOLOGY CLINIC | Age: 73
End: 2023-08-28
Payer: MEDICARE

## 2023-08-28 VITALS
WEIGHT: 229 LBS | BODY MASS INDEX: 35.87 KG/M2 | DIASTOLIC BLOOD PRESSURE: 84 MMHG | SYSTOLIC BLOOD PRESSURE: 122 MMHG | HEART RATE: 60 BPM

## 2023-08-28 DIAGNOSIS — I48.0 PAF (PAROXYSMAL ATRIAL FIBRILLATION) (HCC): ICD-10-CM

## 2023-08-28 DIAGNOSIS — I50.22 CHRONIC SYSTOLIC CHF (CONGESTIVE HEART FAILURE) (HCC): ICD-10-CM

## 2023-08-28 DIAGNOSIS — Z95.0 PACEMAKER: Primary | ICD-10-CM

## 2023-08-28 DIAGNOSIS — I49.5 SSS (SICK SINUS SYNDROME) (HCC): Primary | ICD-10-CM

## 2023-08-28 DIAGNOSIS — I42.8 NICM (NONISCHEMIC CARDIOMYOPATHY) (HCC): ICD-10-CM

## 2023-08-28 DIAGNOSIS — Z95.0 PACEMAKER: ICD-10-CM

## 2023-08-28 DIAGNOSIS — I49.5 SSS (SICK SINUS SYNDROME) (HCC): ICD-10-CM

## 2023-08-28 DIAGNOSIS — Z79.01 ON CONTINUOUS ORAL ANTICOAGULATION: ICD-10-CM

## 2023-08-28 DIAGNOSIS — I10 BENIGN ESSENTIAL HTN: ICD-10-CM

## 2023-08-28 PROCEDURE — 3079F DIAST BP 80-89 MM HG: CPT | Performed by: NURSE PRACTITIONER

## 2023-08-28 PROCEDURE — 1123F ACP DISCUSS/DSCN MKR DOCD: CPT | Performed by: NURSE PRACTITIONER

## 2023-08-28 PROCEDURE — 99214 OFFICE O/P EST MOD 30 MIN: CPT | Performed by: NURSE PRACTITIONER

## 2023-08-28 PROCEDURE — 3074F SYST BP LT 130 MM HG: CPT | Performed by: NURSE PRACTITIONER

## 2023-08-28 PROCEDURE — 93000 ELECTROCARDIOGRAM COMPLETE: CPT | Performed by: NURSE PRACTITIONER

## 2023-10-19 DIAGNOSIS — I48.0 PAF (PAROXYSMAL ATRIAL FIBRILLATION) (HCC): ICD-10-CM

## 2023-10-19 DIAGNOSIS — I49.5 SSS (SICK SINUS SYNDROME) (HCC): ICD-10-CM

## 2023-11-17 ENCOUNTER — OFFICE VISIT (OUTPATIENT)
Dept: CARDIOLOGY CLINIC | Age: 73
End: 2023-11-17
Payer: MEDICARE

## 2023-11-17 VITALS
HEIGHT: 67 IN | SYSTOLIC BLOOD PRESSURE: 110 MMHG | BODY MASS INDEX: 35.79 KG/M2 | HEART RATE: 70 BPM | WEIGHT: 228 LBS | DIASTOLIC BLOOD PRESSURE: 80 MMHG

## 2023-11-17 DIAGNOSIS — I10 PRIMARY HYPERTENSION: ICD-10-CM

## 2023-11-17 DIAGNOSIS — E78.2 MIXED HYPERLIPIDEMIA: ICD-10-CM

## 2023-11-17 DIAGNOSIS — I48.0 PAF (PAROXYSMAL ATRIAL FIBRILLATION) (HCC): ICD-10-CM

## 2023-11-17 DIAGNOSIS — R07.9 CHEST PAIN, UNSPECIFIED TYPE: ICD-10-CM

## 2023-11-17 DIAGNOSIS — I42.8 NICM (NONISCHEMIC CARDIOMYOPATHY) (HCC): ICD-10-CM

## 2023-11-17 DIAGNOSIS — R06.02 SOB (SHORTNESS OF BREATH): ICD-10-CM

## 2023-11-17 DIAGNOSIS — I48.0 PAF (PAROXYSMAL ATRIAL FIBRILLATION) (HCC): Primary | ICD-10-CM

## 2023-11-17 PROCEDURE — 3079F DIAST BP 80-89 MM HG: CPT | Performed by: INTERNAL MEDICINE

## 2023-11-17 PROCEDURE — 1123F ACP DISCUSS/DSCN MKR DOCD: CPT | Performed by: INTERNAL MEDICINE

## 2023-11-17 PROCEDURE — 93000 ELECTROCARDIOGRAM COMPLETE: CPT | Performed by: INTERNAL MEDICINE

## 2023-11-17 PROCEDURE — 3074F SYST BP LT 130 MM HG: CPT | Performed by: INTERNAL MEDICINE

## 2023-11-17 PROCEDURE — 99214 OFFICE O/P EST MOD 30 MIN: CPT | Performed by: INTERNAL MEDICINE

## 2023-11-17 NOTE — PROGRESS NOTES
right ventricle. Trivial mitral regurgitation. Estimated pulmonary artery systolic pressure is at 23 mmHg assuming a right   atrial pressure of 3 mmHg. Summary 5/24/22     There is normal isotope uptake at stress and rest. There is no evidence of    myocardial ischemia or scar. Normal LV size and systolic function. Left ventricular ejection fraction of 75 %. There are no regional wall motion abnormalities. Overall findings represent a low risk scan. 5/16/22 PFTs normal .      4/18/22 PACEART. We received remote transmission from patient's dual chamber pacemaker monitor at home. Transmission shows normal sensing and pacing function. Slight decreasing trend noted in Rwaves; AutoSense On. No new arrhythmias/events recorded. EP physician will review. See interrogation under cardiology tab in the [address] field for more details. Encounter Type: Remote  Encounter Date: 4/18/2022 2:00:10 AM  Last    HTN  optimal   SSS/ SJM dual chamber PPM    11/2018   SJM  Interrogation [facility] dual chamber ACCENT   assessed & reprogrammed   Battery life good  Noise noted on lead / assessed by rep   A paced/ 85%  V paced<1%   afib <1% / 22 sec afib but looks like noise     Pacemaker interrogation. CBC  CMP  Lipids echocardiogram  We might need to do ischemia evaluation further. Stacy Nieto MD, [address] [place] [address]  Office Visit           Stacy Nieto MD,  [facility]                      Cardiology           [facility]  1950 November 17, 2023         HPI:  The patient is 75 y.o. female with is here is seen in the office today for follow-up. Some chest discomfort.   Seems atypical.  She did have a work-up just very recently showed no evidence for coronary artery

## 2023-11-18 LAB
ALBUMIN SERPL-MCNC: 4.5 G/DL (ref 3.4–5)
ALBUMIN/GLOB SERPL: 1.7 {RATIO} (ref 1.1–2.2)
ALP SERPL-CCNC: 98 U/L (ref 40–129)
ALT SERPL-CCNC: 15 U/L (ref 10–40)
ANION GAP SERPL CALCULATED.3IONS-SCNC: 7 MMOL/L (ref 3–16)
AST SERPL-CCNC: 16 U/L (ref 15–37)
BASOPHILS # BLD: 0 K/UL (ref 0–0.2)
BASOPHILS NFR BLD: 0 %
BILIRUB SERPL-MCNC: 0.3 MG/DL (ref 0–1)
BUN SERPL-MCNC: 11 MG/DL (ref 7–20)
BURR CELLS BLD QL SMEAR: ABNORMAL
CALCIUM SERPL-MCNC: 10.4 MG/DL (ref 8.3–10.6)
CHLORIDE SERPL-SCNC: 108 MMOL/L (ref 99–110)
CHOLEST SERPL-MCNC: 123 MG/DL (ref 0–199)
CO2 SERPL-SCNC: 29 MMOL/L (ref 21–32)
CREAT SERPL-MCNC: 0.7 MG/DL (ref 0.6–1.2)
DEPRECATED RDW RBC AUTO: 14.6 % (ref 12.4–15.4)
EOSINOPHIL # BLD: 0.1 K/UL (ref 0–0.6)
EOSINOPHIL NFR BLD: 3 %
GFR SERPLBLD CREATININE-BSD FMLA CKD-EPI: >60 ML/MIN/{1.73_M2}
GLUCOSE SERPL-MCNC: 97 MG/DL (ref 70–99)
HCT VFR BLD AUTO: 37.5 % (ref 36–48)
HDLC SERPL-MCNC: 49 MG/DL (ref 40–60)
HGB BLD-MCNC: 12.4 G/DL (ref 12–16)
LDLC SERPL CALC-MCNC: 48 MG/DL
LYMPHOCYTES # BLD: 2.1 K/UL (ref 1–5.1)
LYMPHOCYTES NFR BLD: 42 %
MCH RBC QN AUTO: 28.7 PG (ref 26–34)
MCHC RBC AUTO-ENTMCNC: 33 G/DL (ref 31–36)
MCV RBC AUTO: 86.9 FL (ref 80–100)
MONOCYTES # BLD: 0.4 K/UL (ref 0–1.3)
MONOCYTES NFR BLD: 8 %
NEUTROPHILS # BLD: 2.3 K/UL (ref 1.7–7.7)
NEUTROPHILS NFR BLD: 44 %
NEUTS BAND NFR BLD MANUAL: 3 % (ref 0–7)
OVALOCYTES BLD QL SMEAR: ABNORMAL
PLATELET # BLD AUTO: 184 K/UL (ref 135–450)
PMV BLD AUTO: 9.3 FL (ref 5–10.5)
POTASSIUM SERPL-SCNC: 4.9 MMOL/L (ref 3.5–5.1)
PROT SERPL-MCNC: 7.1 G/DL (ref 6.4–8.2)
RBC # BLD AUTO: 4.32 M/UL (ref 4–5.2)
SLIDE REVIEW: ABNORMAL
SODIUM SERPL-SCNC: 144 MMOL/L (ref 136–145)
TRIGL SERPL-MCNC: 129 MG/DL (ref 0–150)
VLDLC SERPL CALC-MCNC: 26 MG/DL
WBC # BLD AUTO: 4.9 K/UL (ref 4–11)

## 2023-12-08 PROCEDURE — 93294 REM INTERROG EVL PM/LDLS PM: CPT | Performed by: INTERNAL MEDICINE

## 2023-12-08 PROCEDURE — 93296 REM INTERROG EVL PM/IDS: CPT | Performed by: INTERNAL MEDICINE

## 2023-12-11 RX ORDER — ISOSORBIDE DINITRATE 10 MG/1
10 TABLET ORAL 2 TIMES DAILY
Qty: 180 TABLET | Refills: 2 | Status: SHIPPED | OUTPATIENT
Start: 2023-12-11

## 2023-12-11 NOTE — TELEPHONE ENCOUNTER
Requested Prescriptions     Pending Prescriptions Disp Refills    isosorbide dinitrate (ISORDIL) 10 MG tablet 180 tablet 2     Sig: Take 1 tablet by mouth 2 times daily        Last Office Visit: 8/28/2023     Next Office Visit: 3/20/2024

## 2023-12-27 ENCOUNTER — HOSPITAL ENCOUNTER (OUTPATIENT)
Dept: NON INVASIVE DIAGNOSTICS | Age: 73
Discharge: HOME | End: 2023-12-27
Payer: MEDICARE

## 2023-12-27 DIAGNOSIS — R06.02 SOB (SHORTNESS OF BREATH): ICD-10-CM

## 2023-12-27 DIAGNOSIS — R07.9 CHEST PAIN, UNSPECIFIED TYPE: ICD-10-CM

## 2023-12-27 PROCEDURE — C8923 2D TTE W OR W/O FOL W/CON,CO: HCPCS

## 2024-03-08 PROCEDURE — 93294 REM INTERROG EVL PM/LDLS PM: CPT | Performed by: INTERNAL MEDICINE

## 2024-03-08 PROCEDURE — 93296 REM INTERROG EVL PM/IDS: CPT | Performed by: INTERNAL MEDICINE

## 2024-03-28 RX ORDER — METOPROLOL SUCCINATE 25 MG/1
25 TABLET, EXTENDED RELEASE ORAL DAILY
Qty: 90 TABLET | Refills: 0 | Status: SHIPPED | OUTPATIENT
Start: 2024-03-28

## 2024-03-28 NOTE — TELEPHONE ENCOUNTER
Requested Prescriptions     Pending Prescriptions Disp Refills    metoprolol succinate (TOPROL XL) 25 MG extended release tablet 90 tablet 3     Sig: Take 1 tablet by mouth daily        Last Office Visit: 8/28/2023     Next Office Visit: Visit date not found     \

## 2024-04-01 RX ORDER — METOPROLOL SUCCINATE 25 MG/1
25 TABLET, EXTENDED RELEASE ORAL DAILY
Qty: 90 TABLET | Refills: 2 | Status: SHIPPED | OUTPATIENT
Start: 2024-04-01

## 2024-04-01 NOTE — TELEPHONE ENCOUNTER
Requested Prescriptions     Pending Prescriptions Disp Refills    metoprolol succinate (TOPROL XL) 25 MG extended release tablet 90 tablet 2     Sig: Take 1 tablet by mouth daily       Last Office Visit: 8/28/2023     Next Office Visit: 4/22/2024

## 2024-04-09 NOTE — PROGRESS NOTES
[facility]   Electrophysiology  Office Visit  Date: 4/29/2024    Chief Complaint   Patient presents with    Atrial Fibrillation    Bradycardia    Tachycardia    Hypertension    Cardiomyopathy    Congestive Heart Failure     Cardiac HX: Teresa Hernandez is a 74 y.o. woman with h/o HTN, HLD, SHERRI on CPAP, SSS, s/p dual chamber SJM PPM (7/2014, Dr. Palacio), NICMP, chronic sCHF, echo (2018) EF 45-50%, c/o CP, abnormal MPI, s/p LHC (6/2019, [facility]) showed normal cors, noted to have pAF on device, on Eliquis and flecainide, noted to have noise on RA/RV leads, s/p RA/RV lead revision (3/2020, Dr. Ibarra), echo (12/2023) EF 55-60%.      Interval History/HPI: Patient is here for follow-up for SSS, paroxysmal AF and PPM management.  Patient has a longstanding history of SSS.  She was implanted with a dual-chamber SJM PPM in July 2014.  [place] [facility] [place] when she developed chest pain.  She had a stress test that was abnormal and underwent a left heart cath that showed normal coronaries.  In follow-up she was noted to have paroxysmal AF on her device.  She was placed on Eliquis.  She was then noted to have noise on both her right atrial and right ventricular leads.  She underwent an RA/RV lead revision in March 2020.  Today she presents inAP, VS.   Review of her device today shows 97% AP, less than 1% , 2 AMS episodes 1 that appears to be in atrial tach versus an atrial flutter lasting about 1 minute and 24 seconds.  She is asymptomatic.  She denies any heart racing, palpitations or irregular heartbeats.  She remains on Eliquis 5 mg twice a day.  She has had no issues with bleeding or dark tarry stools currently.  She did have an episode of bleeding per rectum and had gone to the emergency room.  She had a colonoscopy and was told she had hemorrhoids which cause the bleeding.  She was prescribed a medication however she could not afford it.  We did talk about her

## 2024-04-22 RX ORDER — FLECAINIDE ACETATE 50 MG/1
50 TABLET ORAL 2 TIMES DAILY
Qty: 180 TABLET | Refills: 3 | Status: SHIPPED | OUTPATIENT
Start: 2024-04-22 | End: 2024-04-23 | Stop reason: SDUPTHER

## 2024-04-23 RX ORDER — FLECAINIDE ACETATE 50 MG/1
50 TABLET ORAL 2 TIMES DAILY
Qty: 180 TABLET | Refills: 3 | Status: SHIPPED | OUTPATIENT
Start: 2024-04-23

## 2024-04-23 NOTE — TELEPHONE ENCOUNTER
Requested Prescriptions     Pending Prescriptions Disp Refills    flecainide (TAMBOCOR) 50 MG tablet 180 tablet 3     Sig: Take 1 tablet by mouth 2 times daily      Last Office Visit: 8/28/2023     Next Office Visit: 4/29/2024

## 2024-04-29 ENCOUNTER — OFFICE VISIT (OUTPATIENT)
Dept: CARDIOLOGY CLINIC | Age: 74
End: 2024-04-29
Payer: MEDICARE

## 2024-04-29 ENCOUNTER — NURSE ONLY (OUTPATIENT)
Dept: CARDIOLOGY CLINIC | Age: 74
End: 2024-04-29

## 2024-04-29 VITALS
BODY MASS INDEX: 36.49 KG/M2 | HEART RATE: 60 BPM | DIASTOLIC BLOOD PRESSURE: 80 MMHG | WEIGHT: 233 LBS | SYSTOLIC BLOOD PRESSURE: 140 MMHG

## 2024-04-29 DIAGNOSIS — Z79.899 ENCOUNTER FOR MONITORING FLECAINIDE THERAPY: ICD-10-CM

## 2024-04-29 DIAGNOSIS — R07.9 CHEST PAIN, UNSPECIFIED TYPE: ICD-10-CM

## 2024-04-29 DIAGNOSIS — I49.5 SSS (SICK SINUS SYNDROME) (HCC): Primary | ICD-10-CM

## 2024-04-29 DIAGNOSIS — I49.5 SSS (SICK SINUS SYNDROME) (HCC): ICD-10-CM

## 2024-04-29 DIAGNOSIS — Z51.81 ENCOUNTER FOR MONITORING FLECAINIDE THERAPY: ICD-10-CM

## 2024-04-29 DIAGNOSIS — I47.19 ATRIAL TACHYCARDIA (HCC): ICD-10-CM

## 2024-04-29 DIAGNOSIS — I42.8 NICM (NONISCHEMIC CARDIOMYOPATHY) (HCC): ICD-10-CM

## 2024-04-29 DIAGNOSIS — Z95.0 PACEMAKER: Primary | ICD-10-CM

## 2024-04-29 DIAGNOSIS — I50.22 CHRONIC SYSTOLIC CHF (CONGESTIVE HEART FAILURE) (HCC): ICD-10-CM

## 2024-04-29 DIAGNOSIS — I48.0 PAROXYSMAL ATRIAL FIBRILLATION (HCC): ICD-10-CM

## 2024-04-29 DIAGNOSIS — I48.0 PAF (PAROXYSMAL ATRIAL FIBRILLATION) (HCC): ICD-10-CM

## 2024-04-29 DIAGNOSIS — I10 BENIGN ESSENTIAL HTN: ICD-10-CM

## 2024-04-29 DIAGNOSIS — Z79.01 ON CONTINUOUS ORAL ANTICOAGULATION: ICD-10-CM

## 2024-04-29 DIAGNOSIS — Z95.0 PACEMAKER: ICD-10-CM

## 2024-04-29 PROCEDURE — 3077F SYST BP >= 140 MM HG: CPT | Performed by: NURSE PRACTITIONER

## 2024-04-29 PROCEDURE — 3079F DIAST BP 80-89 MM HG: CPT | Performed by: NURSE PRACTITIONER

## 2024-04-29 PROCEDURE — 93000 ELECTROCARDIOGRAM COMPLETE: CPT | Performed by: NURSE PRACTITIONER

## 2024-04-29 PROCEDURE — 1123F ACP DISCUSS/DSCN MKR DOCD: CPT | Performed by: NURSE PRACTITIONER

## 2024-04-29 PROCEDURE — 99215 OFFICE O/P EST HI 40 MIN: CPT | Performed by: NURSE PRACTITIONER

## 2024-04-29 RX ORDER — TRAMADOL HYDROCHLORIDE 50 MG/1
TABLET ORAL
COMMUNITY
Start: 2024-03-25

## 2024-05-06 RX ORDER — HYDRALAZINE HYDROCHLORIDE 25 MG/1
25 TABLET, FILM COATED ORAL 2 TIMES DAILY
Qty: 180 TABLET | Refills: 3 | Status: SHIPPED | OUTPATIENT
Start: 2024-05-06

## 2024-05-06 NOTE — TELEPHONE ENCOUNTER
Requested Prescriptions     Pending Prescriptions Disp Refills    hydrALAZINE (APRESOLINE) 25 MG tablet 180 tablet 3     Sig: Take 1 tablet by mouth 2 times daily            Last Office Visit: 4/29/2024     Next Office Visit: 11.18.2024        Last Labs: 11.17.2023

## 2024-05-14 ENCOUNTER — HOSPITAL ENCOUNTER (OUTPATIENT)
Dept: NUCLEAR MEDICINE | Age: 74
Discharge: HOME | End: 2024-05-14
Payer: MEDICARE

## 2024-05-14 ENCOUNTER — HOSPITAL ENCOUNTER (OUTPATIENT)
Age: 74
Discharge: HOME | End: 2024-05-16
Payer: MEDICARE

## 2024-05-14 ENCOUNTER — HOSPITAL ENCOUNTER (OUTPATIENT)
Dept: NUCLEAR MEDICINE | Age: 74
Discharge: HOME | End: 2024-05-14

## 2024-05-14 DIAGNOSIS — R07.9 CHEST PAIN, UNSPECIFIED TYPE: ICD-10-CM

## 2024-05-14 PROCEDURE — 78452 HT MUSCLE IMAGE SPECT MULT: CPT

## 2024-05-14 PROCEDURE — 6360000002 HC RX W HCPCS: Performed by: NURSE PRACTITIONER

## 2024-05-14 PROCEDURE — A9502 TC99M TETROFOSMIN: HCPCS | Performed by: NURSE PRACTITIONER

## 2024-05-14 PROCEDURE — 93017 CV STRESS TEST TRACING ONLY: CPT

## 2024-05-14 PROCEDURE — 3430000000 HC RX DIAGNOSTIC RADIOPHARMACEUTICAL: Performed by: NURSE PRACTITIONER

## 2024-05-14 RX ORDER — REGADENOSON 0.08 MG/ML
0.4 INJECTION, SOLUTION INTRAVENOUS
Status: COMPLETED | OUTPATIENT
Start: 2024-05-14 | End: 2024-05-14

## 2024-05-14 RX ADMIN — REGADENOSON 0.4 MG: 0.08 INJECTION, SOLUTION INTRAVENOUS at 11:45

## 2024-05-14 RX ADMIN — TETROFOSMIN 10.5 MILLICURIE: 1.38 INJECTION, POWDER, LYOPHILIZED, FOR SOLUTION INTRAVENOUS at 09:44

## 2024-05-14 RX ADMIN — TETROFOSMIN 32.8 MILLICURIE: 1.38 INJECTION, POWDER, LYOPHILIZED, FOR SOLUTION INTRAVENOUS at 11:40

## 2024-05-15 LAB
NUC STRESS EJECTION FRACTION: 64 %
NUC STRESS LV EDV: 97 ML (ref 56–104)
NUC STRESS LV ESV: 35 ML (ref 19–49)
NUC STRESS LV MASS: 132 G
STRESS BASELINE DIAS BP: 84 MMHG
STRESS BASELINE HR: 60 BPM
STRESS BASELINE SYS BP: 167 MMHG
STRESS ESTIMATED WORKLOAD: 1 METS
STRESS EXERCISE DUR MIN: 1 MIN
STRESS EXERCISE DUR SEC: 0 SEC
STRESS PEAK DIAS BP: 98 MMHG
STRESS PEAK SYS BP: 188 MMHG
STRESS PERCENT HR ACHIEVED: 54 %
STRESS POST PEAK HR: 78 BPM
STRESS RATE PRESSURE PRODUCT: NORMAL BPM*MMHG
STRESS ST DEPRESSION: 0 MM
STRESS TARGET HR: 145 BPM

## 2024-05-15 PROCEDURE — 93018 CV STRESS TEST I&R ONLY: CPT | Performed by: INTERNAL MEDICINE

## 2024-05-15 PROCEDURE — 93016 CV STRESS TEST SUPVJ ONLY: CPT | Performed by: INTERNAL MEDICINE

## 2024-05-15 PROCEDURE — 78452 HT MUSCLE IMAGE SPECT MULT: CPT | Performed by: INTERNAL MEDICINE

## 2024-07-15 ENCOUNTER — OFFICE VISIT (OUTPATIENT)
Dept: CARDIOLOGY CLINIC | Age: 74
End: 2024-07-15
Payer: MEDICARE

## 2024-07-15 VITALS
HEIGHT: 67 IN | SYSTOLIC BLOOD PRESSURE: 122 MMHG | BODY MASS INDEX: 36.26 KG/M2 | DIASTOLIC BLOOD PRESSURE: 72 MMHG | WEIGHT: 231 LBS | HEART RATE: 82 BPM

## 2024-07-15 DIAGNOSIS — I49.5 SSS (SICK SINUS SYNDROME) (HCC): ICD-10-CM

## 2024-07-15 DIAGNOSIS — Z94.9 TRANSPLANT: Primary | ICD-10-CM

## 2024-07-15 DIAGNOSIS — E78.5 HYPERLIPIDEMIA LDL GOAL <70: ICD-10-CM

## 2024-07-15 PROCEDURE — 99214 OFFICE O/P EST MOD 30 MIN: CPT | Performed by: INTERNAL MEDICINE

## 2024-07-15 PROCEDURE — 1123F ACP DISCUSS/DSCN MKR DOCD: CPT | Performed by: INTERNAL MEDICINE

## 2024-07-15 PROCEDURE — 3074F SYST BP LT 130 MM HG: CPT | Performed by: INTERNAL MEDICINE

## 2024-07-15 PROCEDURE — 3078F DIAST BP <80 MM HG: CPT | Performed by: INTERNAL MEDICINE

## 2024-07-15 NOTE — PROGRESS NOTES
[facility] [place]  Office Visit           Ajith Arizmendi MD,  [facility]                      Cardiology           Teresa Hernandez  1950    July 15, 2024         HPI:  The patient is 74 y.o. female with is here is seen in the office today here on 7/15/2024 for follow-up and generally doing well.  Her device was implanted in 2015 and it is at TIM.  Apparently somewhere around October of this year may be November and she is being scheduled to see EP.  No chest pains no shortness of breath.  Did have a left Lexiscan Myoview study in May of this year and was negative normal.    For some reason she wants to see a nephrologist.  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  The current creatinine is 0.7.      Has not received the coronavirus vaccine.  \"No I will not take it\".    [facility] normal  cors 10/2017 while visiting in [facility]/ neg stress test in 2018   Left heart cath June 28, 2019 normal coronaries  No SOB no wt gain no PND SHERRI   HTN SSS/ SJM dual chamber PPM    Chronic atrial fibrillation on Eliquis        Review of Systems:  Constitutional: Denies  fatigue, weakness, night sweats or fever.   HEENT: Denies new visual changes, ringing in ears, nosebleeds,nasal congestion  Respiratory: Denies new or change in SOB, PND, orthopnea or cough.   Cardiovascular: see HPI  GI: Denies N/V, diarrhea, constipation, abdominal pain, change in bowel habits, melena or hematochezia  : Denies urinary frequency, urgency, incontinence, hematuria or dysuria.  Skin: Denies rash, hives, or cyanosis  Musculoskeletal: Denies joint or muscle aches/pain  Neurological: Denies syncope or TIA-like symptoms.  Psychiatric: Denies anxiety, insomnia or depression     Past Medical History:   Diagnosis Date    Chronic kidney disease     Hypertension      Past Surgical History:   Procedure

## 2024-10-18 ENCOUNTER — TELEPHONE (OUTPATIENT)
Dept: CARDIOLOGY CLINIC | Age: 74
End: 2024-10-18

## 2024-10-18 NOTE — TELEPHONE ENCOUNTER
CARDIAC CLEARANCE     What type of procedure are you having?  cataract surgery    Which physician is performing your procedure?  Dr. Sushil Basurto     When is your procedure scheduled for?  11/14/24    Where are you having this procedure?  [facility]    Are you taking Blood Thinners?   If so what? (Name/dose/frequesncy)     Does the surgeon want you to stop your blood thinner?  If so for how long?    Phone Number and Contact Name for Physicians office:  [phone]    Fax number to send information:  [phone]

## 2024-10-27 DIAGNOSIS — I49.5 SSS (SICK SINUS SYNDROME) (HCC): ICD-10-CM

## 2024-10-27 DIAGNOSIS — I48.0 PAF (PAROXYSMAL ATRIAL FIBRILLATION) (HCC): ICD-10-CM

## 2024-10-31 RX ORDER — ISOSORBIDE DINITRATE 10 MG/1
10 TABLET ORAL 2 TIMES DAILY
Qty: 180 TABLET | Refills: 3 | Status: SHIPPED | OUTPATIENT
Start: 2024-10-31

## 2024-10-31 NOTE — TELEPHONE ENCOUNTER
Requested Prescriptions     Pending Prescriptions Disp Refills    isosorbide dinitrate (ISORDIL) 10 MG tablet 180 tablet 3     Sig: Take 1 tablet by mouth 2 times daily            Checked Correct Pharmacy: Yes    Any changes since last refill? No     Number: 180    Refills: 3    Last Office Visit: 4/29/2024     Next Office Visit: 11/18/2024     Last Refill: 12/11/2023    Last Labs: 11/17/2023

## 2024-11-05 NOTE — PROGRESS NOTES
[facility]   Cardiac Electrophysiology Consultation   Date: 11/18/2024  Reason for Consultation: Atrial Fibrillation  Consult Requesting Physician: No att. providers found     Chief Complaint:   Chief Complaint   Patient presents with    6 Month Follow-Up    Atrial Fibrillation      HPI: Teresa Hernandez is a 74 y.o. female with PMH significant for HTN, HLD, SSS, s/p SJM dual chamber PPM (2005), s/p generator change (7/31/14, Dr. Palacio), NICMP, HFrEF, echo (2018) EF 45-50%, s/p LHC (6/2019) showed normal coronaries, noted to have PAF on device, on Eliquis and flecainide, noted to have noise on RA/RV leads, s/p RA/RV lead revision (3/3/20, myself), EF improved to 55-60%.    Interval History:   Today, she is here for 6 mo f/u, presenting in SR. Pt has been feeling well from a cardiac perspective. Denies complaints of palpitations, dizziness, CP, SOB, orthopnea, BLE swelling, or syncope. She does complain of sharp pains that run from her device to her L shoulder. This has been going on for the past couple months. She takes pain medication and the pain subside. After her last gen change, she developed a rash at her PPM site, starting with blisters and then the skin became so dry for several months.     All sensing and pacing parameters appear unchanged since last in office check on 04.29.2024. Pt is at TIM 2.60V). AP 96%.  <1%. PVC <1% Underlying AsVs @ 47 bpm.     Assessment:  1.  PAF, on Eliquis and flecainide  2.  SSS, s/p dual chamber PPM   3.  HTN, stable on Toprol, hydralazine, isosorbide  4.  SHERRI, compliant with CPAP  5.  NICMP / HFrecEF, EF 55-60%, follows Dr. Arizmendi  6.  HLD, on statin    Plan:  Schedule for generator change  Follow up in 1 week in device clinic  Follow up in 6 months with EP NP     The risks of generator change discussed.  This includes, but not limited to, the risks of vascular injury, bleeding, infection, device malfunction, lead dislodgement, radiation exposure, injury to

## 2024-11-08 ENCOUNTER — OFFICE VISIT (OUTPATIENT)
Dept: CARDIOLOGY CLINIC | Age: 74
End: 2024-11-08

## 2024-11-08 VITALS
WEIGHT: 225 LBS | DIASTOLIC BLOOD PRESSURE: 72 MMHG | BODY MASS INDEX: 35.24 KG/M2 | HEART RATE: 64 BPM | SYSTOLIC BLOOD PRESSURE: 120 MMHG

## 2024-11-08 DIAGNOSIS — E78.5 HYPERLIPIDEMIA LDL GOAL <70: ICD-10-CM

## 2024-11-08 DIAGNOSIS — I10 PRIMARY HYPERTENSION: ICD-10-CM

## 2024-11-08 DIAGNOSIS — R07.9 CHEST PAIN, UNSPECIFIED TYPE: Primary | ICD-10-CM

## 2024-11-08 DIAGNOSIS — R06.02 SOB (SHORTNESS OF BREATH): ICD-10-CM

## 2024-11-08 DIAGNOSIS — Z95.0 PACEMAKER: ICD-10-CM

## 2024-11-08 NOTE — PROGRESS NOTES
Take 1 tablet by mouth daily       No current facility-administered medications for this visit.       Physical Exam:   /72   Pulse 64   Wt 102.1 kg (225 lb)   BMI 35.24 kg/m²   BP Readings from Last 3 Encounters:   11/08/24 120/72   07/15/24 122/72   04/29/24 (!) 140/80     Pulse Readings from Last 3 Encounters:   11/08/24 64   07/15/24 82   04/29/24 60     Wt Readings from Last 3 Encounters:   11/08/24 102.1 kg (225 lb)   07/15/24 104.8 kg (231 lb)   04/29/24 105.7 kg (233 lb)     Constitutional: She is oriented to person, place, and time. She appears well-developed and well-nourished. In no acute distress.   HEENT: Normocephalic and atraumatic.  Sclerae anicteric. No xanthelasmas. Conjunctiva white, no subconjunctival hemorrhage   External inspection of ears nose teeth & gums   Eyes:PERRLA EOM's intact.   Neck: Neck supple. No JVD present. Carotids without bruits. No mass and no thyromegaly present. No lymphadenopathy present.  Cardiovascular: RRR, normal S1 and S2; no murmur/gallop or rub, PMI nondisplaced  Pulmonary/Chest: Effort normal.  Lungs clear to auscultation. Chest wall nontender  Abdominal: soft, nontender, nondistended. + bowel sounds; no organomegaly or bruits. Aorta normal  Extremities: No edema, cyanosis, or clubbing. Pulses are 2+ radial/carotid/dorsalis pedis bilaterally. Cap refill brisk.  Neurological: No cranial nerve deficit.   Psychiatric: She has a normal mood and affect. Her speech is normal and behavior is normal.     Lab Review:   Lab Results   Component Value Date/Time    TRIG 129 11/17/2023 11:13 AM    HDL 49 11/17/2023 11:13 AM     Lab Results   Component Value Date/Time     11/17/2023 11:13 AM    K 4.9 11/17/2023 11:13 AM     11/17/2023 11:13 AM    CO2 29 11/17/2023 11:13 AM    BUN 11 11/17/2023 11:13 AM    CREATININE 0.7 11/17/2023 11:13 AM    GLUCOSE 97 11/17/2023 11:13 AM    CALCIUM 10.4 11/17/2023 11:13 AM      Lab Results   Component Value Date    WBC 4.9

## 2024-11-12 ENCOUNTER — TELEPHONE (OUTPATIENT)
Dept: CARDIOLOGY CLINIC | Age: 74
End: 2024-11-12

## 2024-11-12 NOTE — TELEPHONE ENCOUNTER
Pt called stating she tried to schedule CTA test with [facility] yesterday and they told her they were unable to schedule the test yet due to her possibly needing a medication prescribed prior to the test. Please call pt to discuss what she needs to do to get the test scheduled.     [phone]

## 2024-11-14 RX ORDER — METOPROLOL TARTRATE 50 MG
TABLET ORAL
Qty: 2 TABLET | Refills: 0 | Status: SHIPPED | OUTPATIENT
Start: 2024-11-14

## 2024-11-14 NOTE — TELEPHONE ENCOUNTER
[facility] called  stating our office needs to call the pt / scheduling and scheduling and schedule the CTA for her after pt has recievd medication. Can reach pt at [phone]

## 2024-11-18 ENCOUNTER — TELEPHONE (OUTPATIENT)
Dept: CARDIOLOGY CLINIC | Age: 74
End: 2024-11-18

## 2024-11-18 ENCOUNTER — OFFICE VISIT (OUTPATIENT)
Dept: CARDIOLOGY CLINIC | Age: 74
End: 2024-11-18

## 2024-11-18 ENCOUNTER — NURSE ONLY (OUTPATIENT)
Dept: CARDIOLOGY CLINIC | Age: 74
End: 2024-11-18

## 2024-11-18 VITALS
HEART RATE: 70 BPM | SYSTOLIC BLOOD PRESSURE: 144 MMHG | WEIGHT: 225 LBS | DIASTOLIC BLOOD PRESSURE: 70 MMHG | BODY MASS INDEX: 35.24 KG/M2

## 2024-11-18 DIAGNOSIS — I49.5 SSS (SICK SINUS SYNDROME) (HCC): ICD-10-CM

## 2024-11-18 DIAGNOSIS — Z95.0 PACEMAKER: Primary | ICD-10-CM

## 2024-11-18 DIAGNOSIS — I49.5 SSS (SICK SINUS SYNDROME) (HCC): Primary | ICD-10-CM

## 2024-11-18 DIAGNOSIS — I48.0 PAF (PAROXYSMAL ATRIAL FIBRILLATION) (HCC): ICD-10-CM

## 2024-11-18 DIAGNOSIS — Z95.0 PACEMAKER: ICD-10-CM

## 2024-11-18 DIAGNOSIS — I50.22 CHRONIC SYSTOLIC CHF (CONGESTIVE HEART FAILURE) (HCC): ICD-10-CM

## 2024-11-18 DIAGNOSIS — G47.33 OSA (OBSTRUCTIVE SLEEP APNEA): ICD-10-CM

## 2024-11-18 DIAGNOSIS — Z51.81 ENCOUNTER FOR MONITORING FLECAINIDE THERAPY: ICD-10-CM

## 2024-11-18 DIAGNOSIS — Z79.01 ON CONTINUOUS ORAL ANTICOAGULATION: ICD-10-CM

## 2024-11-18 DIAGNOSIS — I48.0 PAF (PAROXYSMAL ATRIAL FIBRILLATION) (HCC): Primary | ICD-10-CM

## 2024-11-18 DIAGNOSIS — Z79.899 ENCOUNTER FOR MONITORING FLECAINIDE THERAPY: ICD-10-CM

## 2024-11-18 DIAGNOSIS — E78.2 MIXED HYPERLIPIDEMIA: ICD-10-CM

## 2024-11-18 DIAGNOSIS — I42.8 NICM (NONISCHEMIC CARDIOMYOPATHY) (HCC): ICD-10-CM

## 2024-11-18 DIAGNOSIS — Z45.010 ELECTIVE REPLACEMENT INDICATED FOR CARDIAC PACEMAKER BATTERY AT END OF LIFESPAN: ICD-10-CM

## 2024-11-18 DIAGNOSIS — I10 BENIGN ESSENTIAL HTN: ICD-10-CM

## 2024-11-18 NOTE — TELEPHONE ENCOUNTER
Please schedule pt for gen change with UL at [facility]. Instructions reviewed with pt during OV today. Per UL, her current device is SJM with newer MDT leads. Will plan to replace can with dual chamber MDT.

## 2024-11-18 NOTE — PATIENT INSTRUCTIONS
Generator Change for Permanent Pacemaker    Date of Procedure:     Time of Arrival:     Cardiologist performing procedure: Dr. Ibarra    Arrive at [facility] through the main entrance.  Check in at the Outpatient Diagnostic desk on the 1st floor.    Do not eat or drink anything after midnight the night before the procedure. You may brush your teeth and rinse the morning of the procedure.    Take all your other routine medications the morning of the procedure.    Lab work is due within a week of the procedure. You do not need to be fasting for these labs. This can be done at the [facility] Outpatient lab at [address].    Hold Eliquis for 2 days prior to procedure.    Please use Hibiclens soap (or any antibacterial soap such as Dial) to wash neck, chest, and abdomen the night before (or the morning of) the procedure.      Do not apply any lotion, powder, or deodorant after your shower and the morning of the procedure.    Please bring a list of your medications to the hospital with you.    You must have someone available to drive you home that day and stay with you at home the night of the procedure.    If you are unable to make this appointment, please call [facility], at [phone].

## 2024-11-20 RX ORDER — PREDNISONE 50 MG/1
TABLET ORAL
Qty: 3 TABLET | Refills: 0 | Status: SHIPPED | OUTPATIENT
Start: 2024-11-20 | End: 2024-11-20 | Stop reason: SDUPTHER

## 2024-11-20 RX ORDER — PREDNISONE 50 MG/1
TABLET ORAL
Qty: 3 TABLET | Refills: 0 | Status: SHIPPED | OUTPATIENT
Start: 2024-11-20

## 2024-11-25 ENCOUNTER — TELEPHONE (OUTPATIENT)
Dept: CARDIOLOGY CLINIC | Age: 74
End: 2024-11-25

## 2024-11-25 NOTE — TELEPHONE ENCOUNTER
Called and spoke to patient and she wanted to know when her gen change with UL at [facility] was going to be. I sent a message to the scheduling department for them to call her.

## 2024-11-25 NOTE — TELEPHONE ENCOUNTER
Patient called requesting when will she get a call for her gen change with BARBARA at [facility]. Instructions. Please call her at [phone].

## 2024-11-26 NOTE — TELEPHONE ENCOUNTER
Called and lmom for the patient to call back to get her scheduled for her procedure. Will try again.

## 2024-11-27 NOTE — TELEPHONE ENCOUNTER
Date Of Procedure:  12/20/24    Time Of Arrival: 6:30am     Procedure Time: 8:00am        Called and spoke to patient and she is agreeable to the date and time. Reviewed the Pre-Procedure instructions and they verbalized understanding. Encouraged to call with any questions or concerns.       Published on miloga / emailed to Cath lab / note in chart

## 2024-12-02 ENCOUNTER — PREP FOR PROCEDURE (OUTPATIENT)
Dept: CARDIOLOGY CLINIC | Age: 74
End: 2024-12-02

## 2024-12-02 RX ORDER — SODIUM CHLORIDE 0.9 % (FLUSH) 0.9 %
5-40 SYRINGE (ML) INJECTION EVERY 12 HOURS SCHEDULED
Status: CANCELLED | OUTPATIENT
Start: 2024-12-02

## 2024-12-02 RX ORDER — SODIUM CHLORIDE 0.9 % (FLUSH) 0.9 %
5-40 SYRINGE (ML) INJECTION PRN
Status: CANCELLED | OUTPATIENT
Start: 2024-12-02

## 2024-12-02 RX ORDER — SODIUM CHLORIDE 9 MG/ML
INJECTION, SOLUTION INTRAVENOUS PRN
Status: CANCELLED | OUTPATIENT
Start: 2024-12-02

## 2024-12-16 ENCOUNTER — TELEPHONE (OUTPATIENT)
Dept: CARDIOLOGY CLINIC | Age: 74
End: 2024-12-16

## 2024-12-16 DIAGNOSIS — I49.5 SSS (SICK SINUS SYNDROME) (HCC): ICD-10-CM

## 2024-12-16 LAB
ANION GAP SERPL CALCULATED.3IONS-SCNC: 9 MMOL/L (ref 3–16)
BUN SERPL-MCNC: 11 MG/DL (ref 7–20)
CALCIUM SERPL-MCNC: 10.5 MG/DL (ref 8.3–10.6)
CHLORIDE SERPL-SCNC: 107 MMOL/L (ref 99–110)
CO2 SERPL-SCNC: 27 MMOL/L (ref 21–32)
CREAT SERPL-MCNC: 0.7 MG/DL (ref 0.6–1.2)
DEPRECATED RDW RBC AUTO: 14.2 % (ref 12.4–15.4)
GFR SERPLBLD CREATININE-BSD FMLA CKD-EPI: 90 ML/MIN/{1.73_M2}
GLUCOSE SERPL-MCNC: 86 MG/DL (ref 70–99)
HCT VFR BLD AUTO: 37.8 % (ref 36–48)
HGB BLD-MCNC: 12.3 G/DL (ref 12–16)
INR PPP: 0.99 (ref 0.85–1.15)
MCH RBC QN AUTO: 28.5 PG (ref 26–34)
MCHC RBC AUTO-ENTMCNC: 32.5 G/DL (ref 31–36)
MCV RBC AUTO: 87.7 FL (ref 80–100)
PLATELET # BLD AUTO: 205 K/UL (ref 135–450)
PMV BLD AUTO: 9.5 FL (ref 5–10.5)
POTASSIUM SERPL-SCNC: 4.8 MMOL/L (ref 3.5–5.1)
PROTHROMBIN TIME: 13.3 SEC (ref 11.9–14.9)
RBC # BLD AUTO: 4.31 M/UL (ref 4–5.2)
SODIUM SERPL-SCNC: 143 MMOL/L (ref 136–145)
WBC # BLD AUTO: 5.4 K/UL (ref 4–11)

## 2024-12-16 NOTE — TELEPHONE ENCOUNTER
LVM asking pt to call back.  Need to confirm date/time and preop instructions for 12/20/2024      structions      Generator Change for Permanent Pacemaker     Date of Procedure:      Time of Arrival:      Cardiologist performing procedure: Dr. Ibarra     Arrive at [facility] through the main entrance.  Check in at the Outpatient Diagnostic desk on the 1st floor.     Do not eat or drink anything after midnight the night before the procedure. You may brush your teeth and rinse the morning of the procedure.     Take all your other routine medications the morning of the procedure.     Lab work is due within a week of the procedure. You do not need to be fasting for these labs. This can be done at the [facility] Outpatient lab at [address].     Hold Eliquis for 2 days prior to procedure.     Please use Hibiclens soap (or any antibacterial soap such as Dial) to wash neck, chest, and abdomen the night before (or the morning of) the procedure.       Do not apply any lotion, powder, or deodorant after your shower and the morning of the procedure.     Please bring a list of your medications to the hospital with you.     You must have someone available to drive you home that day and stay with you at home the night of the procedure.     If you are unable to make this appointment, please call [facility], at [phone].          Date Of Procedure:  12/20/24     Time Of Arrival: 6:30am               Procedure Time: 8:00am                            Called and spoke to patient and she is agreeable to the date and time. Reviewed the Pre-Procedure instructions and they verbalized understanding. Encouraged to call with any questions or concerns.         Published on [facility] / emailed to Cath lab / note in chart

## 2024-12-19 NOTE — PROGRESS NOTES
Attempted to call patient about procedure. No answer. Voicemail left. Told to be here at 0630 for procedure at 0800. NPO after midnight, but can take morning medication with sips of water. Office should have told them when and if they should stop any blood thinners. Told to have a responsible adult be with the patient to take them home and stay with them afterwards, if they are not admitted to hospital afterwards. And if available bring current list of medications.

## 2024-12-20 ENCOUNTER — HOSPITAL ENCOUNTER (OUTPATIENT)
Age: 74
Setting detail: OUTPATIENT SURGERY
Discharge: HOME | End: 2024-12-20
Attending: INTERNAL MEDICINE | Admitting: INTERNAL MEDICINE
Payer: MEDICARE

## 2024-12-20 VITALS
TEMPERATURE: 98.2 F | WEIGHT: 225 LBS | OXYGEN SATURATION: 99 % | DIASTOLIC BLOOD PRESSURE: 72 MMHG | HEIGHT: 67 IN | BODY MASS INDEX: 35.31 KG/M2 | HEART RATE: 61 BPM | SYSTOLIC BLOOD PRESSURE: 141 MMHG

## 2024-12-20 DIAGNOSIS — I49.5 SSS (SICK SINUS SYNDROME) (HCC): ICD-10-CM

## 2024-12-20 DIAGNOSIS — Z95.0 PACEMAKER: Primary | ICD-10-CM

## 2024-12-20 LAB
ECHO BSA: 2.2 M2
EKG ATRIAL RATE: 60 BPM
EKG DIAGNOSIS: NORMAL
EKG P AXIS: 48 DEGREES
EKG P-R INTERVAL: 270 MS
EKG Q-T INTERVAL: 422 MS
EKG QRS DURATION: 88 MS
EKG QTC CALCULATION (BAZETT): 422 MS
EKG R AXIS: 0 DEGREES
EKG T AXIS: 23 DEGREES
EKG VENTRICULAR RATE: 60 BPM

## 2024-12-20 PROCEDURE — 6360000002 HC RX W HCPCS: Performed by: INTERNAL MEDICINE

## 2024-12-20 PROCEDURE — 7100000010 HC PHASE II RECOVERY - FIRST 15 MIN: Performed by: INTERNAL MEDICINE

## 2024-12-20 PROCEDURE — 33228 REMV&REPLC PM GEN DUAL LEAD: CPT | Performed by: INTERNAL MEDICINE

## 2024-12-20 PROCEDURE — 99152 MOD SED SAME PHYS/QHP 5/>YRS: CPT | Performed by: INTERNAL MEDICINE

## 2024-12-20 PROCEDURE — 2720000010 HC SURG SUPPLY STERILE: Performed by: INTERNAL MEDICINE

## 2024-12-20 PROCEDURE — 93005 ELECTROCARDIOGRAM TRACING: CPT | Performed by: INTERNAL MEDICINE

## 2024-12-20 PROCEDURE — 7100000011 HC PHASE II RECOVERY - ADDTL 15 MIN: Performed by: INTERNAL MEDICINE

## 2024-12-20 PROCEDURE — C1785 PMKR, DUAL, RATE-RESP: HCPCS | Performed by: INTERNAL MEDICINE

## 2024-12-20 PROCEDURE — 99153 MOD SED SAME PHYS/QHP EA: CPT | Performed by: INTERNAL MEDICINE

## 2024-12-20 PROCEDURE — 2500000003 HC RX 250 WO HCPCS: Performed by: INTERNAL MEDICINE

## 2024-12-20 PROCEDURE — 2709999900 HC NON-CHARGEABLE SUPPLY: Performed by: INTERNAL MEDICINE

## 2024-12-20 PROCEDURE — 93010 ELECTROCARDIOGRAM REPORT: CPT | Performed by: INTERNAL MEDICINE

## 2024-12-20 DEVICE — IPG W1DR01 AZURE XT DR MRI USA
Type: IMPLANTABLE DEVICE | Status: FUNCTIONAL
Brand: AZURE™ XT DR MRI SURESCAN™

## 2024-12-20 RX ORDER — SODIUM CHLORIDE 0.9 % (FLUSH) 0.9 %
5-40 SYRINGE (ML) INJECTION EVERY 12 HOURS SCHEDULED
Status: DISCONTINUED | OUTPATIENT
Start: 2024-12-20 | End: 2024-12-20 | Stop reason: HOSPADM

## 2024-12-20 RX ORDER — MIDAZOLAM 1 MG/ML
INJECTION INTRAMUSCULAR; INTRAVENOUS PRN
Status: DISCONTINUED | OUTPATIENT
Start: 2024-12-20 | End: 2024-12-20 | Stop reason: HOSPADM

## 2024-12-20 RX ORDER — BUPIVACAINE HYDROCHLORIDE 2.5 MG/ML
INJECTION, SOLUTION EPIDURAL; INFILTRATION; INTRACAUDAL PRN
Status: DISCONTINUED | OUTPATIENT
Start: 2024-12-20 | End: 2024-12-20 | Stop reason: HOSPADM

## 2024-12-20 RX ORDER — SODIUM CHLORIDE 0.9 % (FLUSH) 0.9 %
5-40 SYRINGE (ML) INJECTION PRN
Status: DISCONTINUED | OUTPATIENT
Start: 2024-12-20 | End: 2024-12-20 | Stop reason: HOSPADM

## 2024-12-20 RX ORDER — SODIUM CHLORIDE 9 MG/ML
INJECTION, SOLUTION INTRAVENOUS PRN
Status: DISCONTINUED | OUTPATIENT
Start: 2024-12-20 | End: 2024-12-20 | Stop reason: HOSPADM

## 2024-12-20 NOTE — PROGRESS NOTES
Site clean, dry and intact. Pt ambulated to restroom, tolerated well. No c/o pain. Patient/family given discharge instructions. Patient/family verbalize understanding of discharge instructions, all questions addressed, copy given to patient/family. Pt transferred to vehicle via wheelchair to be discharged home with family.

## 2024-12-20 NOTE — H&P
[facility]   Cardiac Electrophysiology H&P  Date: 12/20/2024  Admit Date:  12/20/2024  Reason for admission:   Consult Requesting Physician: Martin Huston*     No chief complaint on file.    HPI: Teresa Hernandez is a 74 y.o. Teresa Hernandez is a 74 y.o. female with PMH significant for HTN, HLD, SSS, s/p SJM dual chamber PPM (2005), s/p generator change (7/31/14, Dr. Palacio), NICMP, HFrEF, echo (2018) EF 45-50%, s/p LHC (6/2019) showed normal coronaries, noted to have PAF on device, on Eliquis and flecainide, noted to have noise on RA/RV leads, s/p RA/RV lead revision (3/3/20, myself), EF improved to 55-60%.     Her device reached TIM and she is here for pacemaker generator change    Patient is scheduled with conscious sedation.  Proceed with generator change with conscious sedation      Past Medical History:   Diagnosis Date    Chronic kidney disease     Hypertension         Past Surgical History:   Procedure Laterality Date    CARDIAC PACEMAKER PLACEMENT      PACEMAKER PLACEMENT         Allergies   Allergen Reactions    Contrast [Iodides] Nausea And Vomiting    Morphine Nausea And Vomiting       Social History:  Reviewed.  reports that she has never smoked. She has never used smokeless tobacco. She reports that she does not drink alcohol and does not use drugs.     Family History:  Reviewed. family history includes Diabetes in her mother; Heart Disease in her mother; Heart Failure in her sister.   No premature CAD.     Review of System:  All other systems reviewed except for that noted above. Pertinent negatives and positives are:     Objective      General: negative for fever, chills   Ophthalmic ROS: negative for - eye pain or loss of vision  ENT ROS: negative for - headaches, sore throat   Respiratory: negative for - cough, sputum  Cardiovascular: Reviewed in HPI  Gastrointestinal: negative for - abdominal pain, diarrhea, N/V  Hematology: negative for - bleeding, blood clots, bruising or

## 2024-12-20 NOTE — DISCHARGE INSTRUCTIONS
[facility] office at:  [phone]    MD Serge Norman, KATHRYN Calabrese RN    Permanent Pacemaker (PPM)/ Implantable Cardioverter Defibrillator (ICD)  Generator Change Care Instructions    Your permanent pacemaker/ICD is a sophisticated piece of electronic equipment and both the wound and the device need special care during your recovery period and into the future.  Please use these instructions as guide.  If you have any questions please call the office.     Wound Care:   Keep the incision site clean and dry for one week.  Do not get the incision or bandage wet.   You may sponge bathe but DO NOT shower for the first seven days or until after your first follow up office visit.   Do not remove the steri strips (the pieces of \"tape\" that cover the incision). These will eventually fall off on their own. The outer dressing may be taken off once you get home.   DO NOT apply soaps, ointments,  lotion or powder to the incision site.   Wear comfortable clothes that will not rub on the incision site. Avoid bra straps or suspenders.   At your one week follow up appointment your bandage will be removed and you will be given further instruction on care of the site at that time.  Your bra strap may lay directly over the incision. If it does - please do not wear the bra strap on the incisional side for 4 weeks to prevent irritation.      When to contact the office:  Changes in how your incision site is healing including:  Increase in swelling and/or tenderness   Increase in redness at the incision site or surrounding the device  Drainage from the incision  If you experience:  Lightheadedness, dizziness or passing out  Increase in fatigue or shortness of breath  Fever (temperature greater than 100 degrees F)  Chills   Prolonged hiccups or chest discomfort  If you have any questions     Activity:   Do not raise your elbow above shoulder level or reach behind your back for 1 week

## 2024-12-23 ENCOUNTER — TELEPHONE (OUTPATIENT)
Dept: CARDIOLOGY CLINIC | Age: 74
End: 2024-12-23

## 2024-12-23 NOTE — TELEPHONE ENCOUNTER
Medication refill:     Medication  predniSONE (DELTASONE) 50 MG tablet [6498]  predniSONE (DELTASONE) 50 MG tablet [[id number]]    Order Details  Dose, Route, Frequency: As Directed   Dispense Quantity: 3 tablet Refills: 0          Sig:    One tab at 10:30  PM night before test  One tab at 5:30 am of test  One tab at 10:30 am this is one hour before test      [id number] - [place], [place] - [address] - P [phone] - F [phone] [[id number]]      Last visit: 11/28/24  Next visit: 12/30/24  Refill: 11/20/24

## 2024-12-23 NOTE — TELEPHONE ENCOUNTER
Tried to contact patient however unable to reach, if she calls back- go over medications to take prior to CTA.  Patient needs to take Metoprolol 50mg 1 tablet night before the CTA and 1 tablet an hour before CTA.  Also needs to take Prednisone due to allergy to contrast. Prednisone 50mg 1 tab at 1030pm, 1 tab at 530am, and 1 tab at 1030am hour before CTA.

## 2024-12-23 NOTE — TELEPHONE ENCOUNTER
Called and discussed the protocol of the CTA with the patient and how to take the medicine prior.

## 2024-12-23 NOTE — TELEPHONE ENCOUNTER
Patient would like a call back to go over questions about taking Benadryl. Can reach patient at [phone]

## 2024-12-24 ENCOUNTER — TELEPHONE (OUTPATIENT)
Dept: CARDIOLOGY CLINIC | Age: 74
End: 2024-12-24

## 2024-12-24 NOTE — TELEPHONE ENCOUNTER
Patient called and stated she recently had got out of the hospital 12/20/24 from having her pacemaker replaced.  She stated she has an angiogram scheduled on 12/26/24 but wanted to cancel that appt.  She said she was still in pain and wasn't feeling up to it.  She stated she spoke with Dana and was informed the appt was through [facility].  She wants a call back regarding canceling her appt and getting rescheduled.  Call back [phone]

## 2024-12-26 ENCOUNTER — NURSE ONLY (OUTPATIENT)
Dept: CARDIOLOGY CLINIC | Age: 74
End: 2024-12-26

## 2024-12-26 DIAGNOSIS — Z95.0 PACEMAKER: Primary | ICD-10-CM

## 2024-12-26 DIAGNOSIS — I48.0 PAF (PAROXYSMAL ATRIAL FIBRILLATION) (HCC): ICD-10-CM

## 2024-12-26 DIAGNOSIS — I49.5 SSS (SICK SINUS SYNDROME) (HCC): ICD-10-CM

## 2024-12-26 NOTE — PROGRESS NOTES
Pt comes in for a 1 week wound and programming evaluation s/p SJM Gen change (TIM/RRT) to MDT on 12.20.2024//UL.  (SJM A/V leads capped 3.3.2020 d/t to noise).    [employer] setup/pairing not completed.  Discussed MARLENA setup, remote monitoring, appt frequencies and communications.  Marlena literature, RYAN brochure and AVS provided today.    Dressing and pt applied OTC band-aids removed from left chest device site. Adhesive tape irritation and vesicles noted.  NPFW present. Instructed to leave alone and keep SANAZ.  Incision appears well approximated, CDI, SS remain. Reviewed post op wound care and restrictions.  Pt informed to call office if she develops any fever, chills, increased swelling, redness or drainage from site.  Pt v/u.    Interrogation available in [facility] and will be available in [employer] under the Cardiology tab once reviewed by EPMD.  Pt will follow up in 3 months w/EPNP and device.  Will monitor remotely once setup completed.

## 2024-12-27 ENCOUNTER — TELEPHONE (OUTPATIENT)
Dept: CARDIOLOGY CLINIC | Age: 74
End: 2024-12-27

## 2024-12-27 NOTE — TELEPHONE ENCOUNTER
Pt called stating that Dr. Basurto's office needs cardiac clearance but the pt was just seen 11/08/2024 by Dr. Arizmendi and 11/18/2024 by Dr. Huston. Pt is not sure if she will need to schedule another appointment or not. Dr. Basurto's office is requesting clearance be sent 01/08/2025 or after to be within the 30 day of procedure. Unable to verify fax number nor if they are requesting blood thinner hold as Dr. Basurto's office number has no voicemail service and no one is answerin. It just states to call back another time.    CARDIAC CLEARANCE     What type of procedure are you having? Single cataract of left eye    Which physician is performing your procedure? [facility]    When is your procedure scheduled for? 02.06.2025    Where are you having this procedure? [place]    Are you taking Blood Thinners?   If so what? apixaban (ELIQUIS) 5 MG TABS tablet [[id number]]    Order Details  Dose, Route, Frequency: As Directed   Dispense Quantity: 180 tablet Refills: 2          Sig: TAKE 1 TABLET TWICE A DAY         Start Date: 10/29/24          Does the surgeon want you to stop your blood thinner? Unknown    If so for how long?    Phone Number and Contact Name for Physicians office:  [phone]    Fax number to send information:  unknown

## 2025-01-07 NOTE — TELEPHONE ENCOUNTER
Tried to call patient and there was no voicemail, I have spoke to the patient last week and faxed her cardiac clearance to the eye surgeon.

## 2025-01-08 DIAGNOSIS — I49.5 SSS (SICK SINUS SYNDROME) (HCC): ICD-10-CM

## 2025-01-08 DIAGNOSIS — I48.0 PAF (PAROXYSMAL ATRIAL FIBRILLATION) (HCC): ICD-10-CM

## 2025-01-08 RX ORDER — PREDNISONE 50 MG/1
TABLET ORAL
Qty: 3 TABLET | Refills: 0 | Status: SHIPPED | OUTPATIENT
Start: 2025-01-08

## 2025-01-08 RX ORDER — HYDRALAZINE HYDROCHLORIDE 25 MG/1
25 TABLET, FILM COATED ORAL 2 TIMES DAILY
Qty: 180 TABLET | Refills: 1 | Status: SHIPPED | OUTPATIENT
Start: 2025-01-08

## 2025-01-08 RX ORDER — METOPROLOL SUCCINATE 25 MG/1
25 TABLET, EXTENDED RELEASE ORAL DAILY
Qty: 90 TABLET | Refills: 2 | Status: SHIPPED | OUTPATIENT
Start: 2025-01-08

## 2025-01-08 RX ORDER — FLECAINIDE ACETATE 50 MG/1
50 TABLET ORAL 2 TIMES DAILY
Qty: 180 TABLET | Refills: 1 | Status: SHIPPED | OUTPATIENT
Start: 2025-01-08

## 2025-01-08 RX ORDER — ISOSORBIDE DINITRATE 10 MG/1
10 TABLET ORAL 2 TIMES DAILY
Qty: 180 TABLET | Refills: 1 | Status: SHIPPED | OUTPATIENT
Start: 2025-01-08

## 2025-01-29 ENCOUNTER — OFFICE VISIT (OUTPATIENT)
Dept: CARDIOLOGY CLINIC | Age: 75
End: 2025-01-29
Payer: MEDICARE

## 2025-01-29 VITALS
HEART RATE: 66 BPM | DIASTOLIC BLOOD PRESSURE: 70 MMHG | BODY MASS INDEX: 36.34 KG/M2 | WEIGHT: 232 LBS | SYSTOLIC BLOOD PRESSURE: 128 MMHG

## 2025-01-29 DIAGNOSIS — R07.89 CHEST DISCOMFORT: Primary | ICD-10-CM

## 2025-01-29 PROCEDURE — 3078F DIAST BP <80 MM HG: CPT | Performed by: NURSE PRACTITIONER

## 2025-01-29 PROCEDURE — 1159F MED LIST DOCD IN RCRD: CPT | Performed by: NURSE PRACTITIONER

## 2025-01-29 PROCEDURE — 99215 OFFICE O/P EST HI 40 MIN: CPT | Performed by: NURSE PRACTITIONER

## 2025-01-29 PROCEDURE — 3074F SYST BP LT 130 MM HG: CPT | Performed by: NURSE PRACTITIONER

## 2025-01-29 PROCEDURE — 1160F RVW MEDS BY RX/DR IN RCRD: CPT | Performed by: NURSE PRACTITIONER

## 2025-01-29 PROCEDURE — 1123F ACP DISCUSS/DSCN MKR DOCD: CPT | Performed by: NURSE PRACTITIONER

## 2025-01-29 NOTE — PROGRESS NOTES
[facility] [place]     Outpatient Follow Up Note  Dr Ajith Arizmendi MD,  FACC >> EP pt   Natalya Hayes RN, APRN,CNP CVNP      CHIEF COMPLAINT / HPI:  Teresa Hernandez is 75 y.o. female who presents today with [facility] normal  cors 10/2017 while visiting in [facility]/ neg stress test in 2018   [facility]  28, 2019 normal coronaries  No SOB no wt gain no PND SHERRI   HTN SSS/ SJM dual chamber PPM    Chronic atrial fibrillation on Eliquis  SHERRI uses CPAP     CTA cardiac 1/22/25  no significant CAD leads assessed and wnl     Interval history:  VSS wt stable, c/o left sided pressure at rest and has felt like this since PPM generator was changed , no mild SOB with activity, no edema, states she states she is a is poor sleeper & uses CPAP  no c/o PND / no c/o fever cough & site is dry approx and appears healing well   She appears comfortable, no resp distress noted       12/27/24 PM check  \"Pt comes in for a 1 week wound and programming evaluation s/p SJM Gen change (TIM/RRT) to MDT on 12.20.2024//UL. (SJM A/V leads capped 3.3.2020 d/t to noise). [facility] setup/pairing not completed. Discussed MARLENA setup, remote monitoring, appt frequencies and communications. Marlena literature, RYAN brochure and AVS provided today. All sensing and pacing parameters appear unchanged s/p Gen change. 13 yrs est until TIM/RRT. Underlying AsVs, SB 40-50s. AP 98.5%.  <0.1% PVC 0/hr. PT remains on eliquis, flecainide, metoprolo Pt will follow up in 3 months w/EPNP and device. Will monitor remotely once setup completed. Created By: Chiara Raymond 12/26/2024 02:20 PM Wound Check Dressing and pt applied OTC band-aids removed from left chest device site. Adhesive tape irritation and vesicles noted. NPFW present. Instructed to leave alone and keep SANAZ. Incision appears well approximated, CDI, SS remain. Reviewed post op wound care and restrictions.  Pt informed to call office if she devel any fever, chills, increased swelling, redness or drainage from site.

## 2025-03-05 ENCOUNTER — TELEPHONE (OUTPATIENT)
Dept: CARDIOLOGY CLINIC | Age: 75
End: 2025-03-05

## 2025-03-05 NOTE — TELEPHONE ENCOUNTER
Cardiac Risk Assessment  for Procedures and Surgery    What type of procedure are you having: Eye Surgery    When is your procedure scheduled for: 3/27/25    What physician is performing your procedure: Dr. Basurto- [facility]    Phone Number: [phone]    Fax number to send the letter: [phone]      Cardiologist Dr. Arizmendi    Last Appointment: Saw JM 1/19/25    Next Appointment: 3/25 for device/ NPFW    Are you on any blood thinners: Eliquis 5mg                    If yes what?    History of Coronary Artery Disease:    Last Stress test:    Last echo:    Device type and :

## 2025-03-11 NOTE — TELEPHONE ENCOUNTER
Called patient to let her know that the cardiac clearance has been sent to the surgeons office for the eye procedure that is happening on March 27,2025.

## 2025-03-11 NOTE — TELEPHONE ENCOUNTER
Pt's spouse called requesting to speak with Dana about pt's clearance. I let him know clearance has been faxed and gave Eliquis hold instructions. He would like Dana to contact pt at convenience.     [phone]

## 2025-03-11 NOTE — PROGRESS NOTES
[facility]   Electrophysiology  Office Visit  Date: 3/25/2025    Chief Complaint   Patient presents with    Bradycardia    Atrial Fibrillation    Cardiomyopathy    Congestive Heart Failure    Hypertension     Cardiac HX: Teresa Hernandez is a 75 y.o. woman with h/o HTN, HLD, SHERRI on CPAP, SSS, s/p dual chamber SJM PPM (7/2014, Dr. Palacio), NICMP, chronic sCHF, echo (2018) EF 45-50%, c/o CP, s/p abnormal MPI, s/p LHC (6/2019, [facility]) w/ normal cors, noted to have pAF on device, placed on Eliquis, flecainide, noted to have noise on RA/RV leads, s/p RA/RV lead revision (3/2020, Dr. Ibarra), echo (12/2023) EF 55-60%, s/p gen change (12/2024, Dr. Ibarra).      Interval History/HPI: Patient is here for follow-up for SSS, paroxysmal AF and PPM management.  Patient has a longstanding history of SSS.  She was implanted with a dual-chamber SJM PPM in July 2014.  In 2019 she was in [facility] visiting her mother when she developed chest pain.  She had an MPI that was abnormal and had a left heart cath that showed normal coronaries.  In follow-up she was noted to have paroxysmal atrial fibrillation on her device.  She was placed on Eliquis.  She then was noted to have noise in both the atrial and ventricular leads.  She underwent an RA/RV lead revision in March 2020.  She had an echo in December 2023 that showed her EF was 55 to 60%.  She then underwent a generator change in December 2024.  Today she presents in AP, VS, no arrhythmias, other parameters are stable.  Review of her device today shows 98.8% AP, < 0.1% , no arrhythmias, other parameters stable.   She remains on Eliquis 5 mg twice a day with no issues with bleeding or dark tarry stools.  She is on flecainide 50 mg twice a day.  Her QRS is 81.  Her blood pressure is a little elevated in the office today.  We did discuss diet, exercise and weight loss.  Weight does appear to be up a couple pounds.  She has been working at the school as a

## 2025-03-25 ENCOUNTER — CLINICAL SUPPORT (OUTPATIENT)
Dept: CARDIOLOGY CLINIC | Age: 75
End: 2025-03-25

## 2025-03-25 ENCOUNTER — OFFICE VISIT (OUTPATIENT)
Dept: CARDIOLOGY CLINIC | Age: 75
End: 2025-03-25
Payer: MEDICARE

## 2025-03-25 VITALS
HEART RATE: 70 BPM | DIASTOLIC BLOOD PRESSURE: 88 MMHG | SYSTOLIC BLOOD PRESSURE: 128 MMHG | BODY MASS INDEX: 36.96 KG/M2 | WEIGHT: 236 LBS

## 2025-03-25 DIAGNOSIS — I49.5 SSS (SICK SINUS SYNDROME) (HCC): Primary | ICD-10-CM

## 2025-03-25 DIAGNOSIS — Z51.81 ENCOUNTER FOR MONITORING FLECAINIDE THERAPY: ICD-10-CM

## 2025-03-25 DIAGNOSIS — Z95.0 PACEMAKER: ICD-10-CM

## 2025-03-25 DIAGNOSIS — Z79.899 ENCOUNTER FOR MONITORING FLECAINIDE THERAPY: ICD-10-CM

## 2025-03-25 DIAGNOSIS — I10 BENIGN ESSENTIAL HTN: ICD-10-CM

## 2025-03-25 DIAGNOSIS — Z79.01 ON CONTINUOUS ORAL ANTICOAGULATION: ICD-10-CM

## 2025-03-25 DIAGNOSIS — I48.0 PAF (PAROXYSMAL ATRIAL FIBRILLATION) (HCC): ICD-10-CM

## 2025-03-25 DIAGNOSIS — I50.22 CHRONIC SYSTOLIC CHF (CONGESTIVE HEART FAILURE) (HCC): ICD-10-CM

## 2025-03-25 DIAGNOSIS — I42.8 NICM (NONISCHEMIC CARDIOMYOPATHY) (HCC): ICD-10-CM

## 2025-03-25 PROCEDURE — 3074F SYST BP LT 130 MM HG: CPT | Performed by: NURSE PRACTITIONER

## 2025-03-25 PROCEDURE — 3079F DIAST BP 80-89 MM HG: CPT | Performed by: NURSE PRACTITIONER

## 2025-03-25 PROCEDURE — 1159F MED LIST DOCD IN RCRD: CPT | Performed by: NURSE PRACTITIONER

## 2025-03-25 PROCEDURE — 1160F RVW MEDS BY RX/DR IN RCRD: CPT | Performed by: NURSE PRACTITIONER

## 2025-03-25 PROCEDURE — 99214 OFFICE O/P EST MOD 30 MIN: CPT | Performed by: NURSE PRACTITIONER

## 2025-03-25 PROCEDURE — 1123F ACP DISCUSS/DSCN MKR DOCD: CPT | Performed by: NURSE PRACTITIONER

## 2025-03-25 PROCEDURE — 93000 ELECTROCARDIOGRAM COMPLETE: CPT | Performed by: NURSE PRACTITIONER

## 2025-03-25 RX ORDER — NEOMYCIN SULFATE, POLYMYXIN B SULFATE AND DEXAMETHASONE 3.5; 10000; 1 MG/ML; [USP'U]/ML; MG/ML
1 SUSPENSION/ DROPS OPHTHALMIC 2 TIMES DAILY
COMMUNITY
Start: 2025-03-04

## 2025-03-26 NOTE — PROGRESS NOTES
[employer] transmission received and sent to [facility] for review via [employer].

## 2025-04-23 ENCOUNTER — TELEPHONE (OUTPATIENT)
Dept: CARDIOLOGY CLINIC | Age: 75
End: 2025-04-23

## 2025-04-23 NOTE — TELEPHONE ENCOUNTER
The patient's spouse Nate called requesting to speak to Mary FORDE. Nate did not state what the call back was regarding.  [phone]

## 2025-05-22 ENCOUNTER — TELEPHONE (OUTPATIENT)
Dept: CARDIOLOGY CLINIC | Age: 75
End: 2025-05-22

## 2025-05-22 NOTE — TELEPHONE ENCOUNTER
The patient called and would like to know if it's ok for her to take Zepbound 2.5 mg once a week with her heart medication. Please advise [phone]

## 2025-06-20 NOTE — PROGRESS NOTES
[facility]     Outpatient Cardiology         Patient Name:  Teresa Hernandez  Primary Care Physician: Chase Vega MD  06/25/25     Assessment & Plan    Assessment / Plan:     Paroxysmal A-fib-on Eliquis and flecainide.  Cardiac rhythm is stable.  Continue medications including Toprol-XL.  Patient is status post pacemaker.  Follow-up with EP.  Hyperlipidemia-on Lipitor.  Last LDL not at goal .  Continue low-cholesterol diet.  Continue atorvastatin.  Obstructive sleep apnea-continue weight reduction and CPAP  Patient had normal coronaries in 2017.  Patient on Isordil to potentiate the effect of hydralazine.  Has had some headaches.  Will stop Isordil.  LVEF was normal in December 2023.  Labs done on 6/16/2025 at [facility] reviewed.  Patient had a calcium level of 11.  Advised to follow-up with PCP to recheck the calcium level and evaluate if patient has hypercalcemia.  Patient denies any bone pains.  Has sciatica.              Chief Complaint:     Chief Complaint   Patient presents with    Follow-up     3 Month follow up        History of Present Illness:       HPI     Teresa Hernandez is a 75 y.o. female with PMH of HLD, SHERRI uses CPAP, SSS s/p dual chamber PPM and LHC with normal cors 10/17 in [facility], paroxysmal Afib here for a follow up.  Previously seen by Dr Arizmendi.      Today she reports she is doing well.    Patient denies any chest pain, shortness of breath, palpitations, presyncope or syncope. No TIA. No claudication. No recent hospitalizations    PMH  Past Medical History:   Diagnosis Date    Chronic kidney disease     Hypertension        PSH  Past Surgical History:   Procedure Laterality Date    CARDIAC PACEMAKER PLACEMENT      EP DEVICE PROCEDURE N/A 12/20/2024    Pacemaker battery change performed by Shjai Huston MD at [facility] CARDIAC CATH LAB    PACEMAKER PLACEMENT          Social HIstory  Social History     Tobacco Use    Smoking status: Never

## 2025-06-25 ENCOUNTER — OFFICE VISIT (OUTPATIENT)
Dept: CARDIOLOGY CLINIC | Age: 75
End: 2025-06-25
Payer: MEDICARE

## 2025-06-25 VITALS
HEART RATE: 94 BPM | WEIGHT: 229 LBS | BODY MASS INDEX: 35.87 KG/M2 | SYSTOLIC BLOOD PRESSURE: 112 MMHG | OXYGEN SATURATION: 96 % | DIASTOLIC BLOOD PRESSURE: 68 MMHG

## 2025-06-25 DIAGNOSIS — G47.33 OSA (OBSTRUCTIVE SLEEP APNEA): Primary | ICD-10-CM

## 2025-06-25 DIAGNOSIS — E78.5 HYPERLIPIDEMIA LDL GOAL <70: ICD-10-CM

## 2025-06-25 DIAGNOSIS — I48.0 PAF (PAROXYSMAL ATRIAL FIBRILLATION) (HCC): ICD-10-CM

## 2025-06-25 PROCEDURE — 3078F DIAST BP <80 MM HG: CPT | Performed by: INTERNAL MEDICINE

## 2025-06-25 PROCEDURE — 3074F SYST BP LT 130 MM HG: CPT | Performed by: INTERNAL MEDICINE

## 2025-06-25 PROCEDURE — 1159F MED LIST DOCD IN RCRD: CPT | Performed by: INTERNAL MEDICINE

## 2025-06-25 PROCEDURE — 99214 OFFICE O/P EST MOD 30 MIN: CPT | Performed by: INTERNAL MEDICINE

## 2025-06-25 PROCEDURE — 1160F RVW MEDS BY RX/DR IN RCRD: CPT | Performed by: INTERNAL MEDICINE

## 2025-06-25 PROCEDURE — 1123F ACP DISCUSS/DSCN MKR DOCD: CPT | Performed by: INTERNAL MEDICINE

## 2025-07-07 ENCOUNTER — OFFICE VISIT (OUTPATIENT)
Dept: CARDIOLOGY CLINIC | Age: 75
End: 2025-07-07
Payer: MEDICARE

## 2025-07-07 VITALS
WEIGHT: 226 LBS | HEIGHT: 67 IN | HEART RATE: 61 BPM | DIASTOLIC BLOOD PRESSURE: 86 MMHG | SYSTOLIC BLOOD PRESSURE: 132 MMHG | BODY MASS INDEX: 35.47 KG/M2

## 2025-07-07 DIAGNOSIS — I48.0 PAF (PAROXYSMAL ATRIAL FIBRILLATION) (HCC): ICD-10-CM

## 2025-07-07 DIAGNOSIS — Z79.899 ENCOUNTER FOR MONITORING FLECAINIDE THERAPY: ICD-10-CM

## 2025-07-07 DIAGNOSIS — Z51.81 ENCOUNTER FOR MONITORING FLECAINIDE THERAPY: ICD-10-CM

## 2025-07-07 DIAGNOSIS — I49.5 SSS (SICK SINUS SYNDROME) (HCC): ICD-10-CM

## 2025-07-07 DIAGNOSIS — E78.5 HYPERLIPIDEMIA LDL GOAL <70: ICD-10-CM

## 2025-07-07 DIAGNOSIS — G47.33 OSA (OBSTRUCTIVE SLEEP APNEA): ICD-10-CM

## 2025-07-07 DIAGNOSIS — Z79.01 ON CONTINUOUS ORAL ANTICOAGULATION: ICD-10-CM

## 2025-07-07 DIAGNOSIS — R07.9 CHEST PAIN, UNSPECIFIED TYPE: ICD-10-CM

## 2025-07-07 DIAGNOSIS — Z95.0 PACEMAKER: ICD-10-CM

## 2025-07-07 DIAGNOSIS — I49.5 SSS (SICK SINUS SYNDROME) (HCC): Primary | ICD-10-CM

## 2025-07-07 LAB
ANION GAP SERPL CALCULATED.3IONS-SCNC: 12 MMOL/L (ref 3–16)
BUN SERPL-MCNC: 12 MG/DL (ref 7–20)
CALCIUM SERPL-MCNC: 10.3 MG/DL (ref 8.3–10.6)
CHLORIDE SERPL-SCNC: 103 MMOL/L (ref 99–110)
CO2 SERPL-SCNC: 21 MMOL/L (ref 21–32)
CREAT SERPL-MCNC: 0.8 MG/DL (ref 0.6–1.2)
GFR SERPLBLD CREATININE-BSD FMLA CKD-EPI: 76 ML/MIN/{1.73_M2}
GLUCOSE SERPL-MCNC: 86 MG/DL (ref 70–99)
POTASSIUM SERPL-SCNC: NORMAL MMOL/L (ref 3.5–5.1)
SODIUM SERPL-SCNC: 136 MMOL/L (ref 136–145)
TROPONIN, HIGH SENSITIVITY: 8 NG/L (ref 0–14)

## 2025-07-07 PROCEDURE — 99215 OFFICE O/P EST HI 40 MIN: CPT | Performed by: NURSE PRACTITIONER

## 2025-07-07 PROCEDURE — 3075F SYST BP GE 130 - 139MM HG: CPT | Performed by: NURSE PRACTITIONER

## 2025-07-07 PROCEDURE — 3079F DIAST BP 80-89 MM HG: CPT | Performed by: NURSE PRACTITIONER

## 2025-07-07 PROCEDURE — 93000 ELECTROCARDIOGRAM COMPLETE: CPT | Performed by: NURSE PRACTITIONER

## 2025-07-07 PROCEDURE — 1159F MED LIST DOCD IN RCRD: CPT | Performed by: NURSE PRACTITIONER

## 2025-07-07 PROCEDURE — 1160F RVW MEDS BY RX/DR IN RCRD: CPT | Performed by: NURSE PRACTITIONER

## 2025-07-07 PROCEDURE — 1123F ACP DISCUSS/DSCN MKR DOCD: CPT | Performed by: NURSE PRACTITIONER

## 2025-07-07 RX ORDER — MOXIFLOXACIN 5 MG/ML
SOLUTION/ DROPS OPHTHALMIC
COMMUNITY

## 2025-07-07 RX ORDER — PREDNISOLONE ACETATE 10 MG/ML
SUSPENSION/ DROPS OPHTHALMIC
COMMUNITY

## 2025-07-07 RX ORDER — KETOROLAC TROMETHAMINE 5 MG/ML
SOLUTION OPHTHALMIC
COMMUNITY

## 2025-07-07 RX ORDER — TIRZEPATIDE 5 MG/.5ML
5 INJECTION, SOLUTION SUBCUTANEOUS
COMMUNITY
Start: 2025-05-29

## 2025-07-07 NOTE — PROGRESS NOTES
[facility]   Electrophysiology  Office Visit  Date: 7/7/2025    Chief Complaint   Patient presents with    Chest Pain     Since Saturday- cramping pain radiate around chest and arm    Follow-up       HPI/Cardiac HX: Teresa Hernandez is a 75 y.o. woman with h/o HTN, HLD, SHERRI on CPAP, SSS, s/p dual chamber SJM PPM (7/2014, Dr. Palacio), NICMP, chronic sCHF, echo (2018) EF 45-50%, c/o CP, s/p abnormal MPI, s/p LHC (6/2019, [facility]) w/ normal cors, noted to have pAF on device, placed on Eliquis, flecainide, noted to have noise on RA/RV leads, s/p RA/RV lead revision (3/2020, Dr. Ibarra), echo (12/2023) EF 55-60%, s/p gen change (12/2024, Dr. Ibarra).  CTA Cardiac (1/22/25) no significant CAD.     Interval History:  Patient is here for chest pain. Today she presents in NSR. No acute ischemic changes on EKG. on Saturday patient was at [employer] picking up a birthday cake when she had a sudden onset of left upper quadrant discomfort radiating up underneath her breast and up into her left shoulder.  She had never felt pain like this before.  Pain lasted a few minutes and resolved on its own.  She denies any associated symptoms such as shortness of breath, nausea vomiting or diaphoresis.  She denies any palpitations heart racing or irregular heartbeat.  She denies any lightheaded or dizziness.  She has not had reoccurrence of this pain since Saturday.  Denies any heavy lifting although she has been remodeling her bathroom and painting.    She reports chronic constipation has not had a bowel movement for the last 3 or 4 days.  Bowel sounds on assessment was hypoactive in all quadrants.    .  Patient with a significant GI hx; s/p laparoscopic gastric sleeve, small bowel obstruction with multiple exploratory surgeries for bowel obstruction, chronic constipation, esophageal reflux and stricture requiring dilation. She does have a recurrent incisional hernia who she follows with surgery for. She if not a good

## 2025-07-08 ENCOUNTER — RESULTS FOLLOW-UP (OUTPATIENT)
Dept: CARDIOLOGY CLINIC | Age: 75
End: 2025-07-08

## (undated) DEVICE — PLASMABLADE PS210-030S 3.0S LOCK: Brand: PLASMABLADE™

## (undated) DEVICE — PACEMAKER PACK: Brand: MEDLINE INDUSTRIES, INC.